# Patient Record
Sex: FEMALE | NOT HISPANIC OR LATINO | Employment: OTHER | URBAN - NONMETROPOLITAN AREA
[De-identification: names, ages, dates, MRNs, and addresses within clinical notes are randomized per-mention and may not be internally consistent; named-entity substitution may affect disease eponyms.]

---

## 2017-01-11 ENCOUNTER — OFFICE VISIT (OUTPATIENT)
Dept: CARDIOLOGY | Facility: CLINIC | Age: 72
End: 2017-01-11

## 2017-01-11 VITALS
WEIGHT: 213 LBS | BODY MASS INDEX: 36.37 KG/M2 | HEART RATE: 72 BPM | HEIGHT: 64 IN | SYSTOLIC BLOOD PRESSURE: 130 MMHG | DIASTOLIC BLOOD PRESSURE: 60 MMHG

## 2017-01-11 DIAGNOSIS — I11.9 HYPERTENSIVE HEART DISEASE WITHOUT HEART FAILURE: Primary | ICD-10-CM

## 2017-01-11 DIAGNOSIS — E11.9 CONTROLLED TYPE 2 DIABETES MELLITUS WITHOUT COMPLICATION, WITH LONG-TERM CURRENT USE OF INSULIN (HCC): ICD-10-CM

## 2017-01-11 DIAGNOSIS — E78.00 HYPERCHOLESTEREMIA: ICD-10-CM

## 2017-01-11 DIAGNOSIS — R06.2 WHEEZES: ICD-10-CM

## 2017-01-11 DIAGNOSIS — E66.9 OBESITY WITH BODY MASS INDEX OF 30.0-39.9: ICD-10-CM

## 2017-01-11 DIAGNOSIS — I77.9 BILATERAL CAROTID ARTERY DISEASE (HCC): ICD-10-CM

## 2017-01-11 DIAGNOSIS — R09.89 LEFT CAROTID BRUIT: ICD-10-CM

## 2017-01-11 DIAGNOSIS — I73.9 PVD (PERIPHERAL VASCULAR DISEASE) (HCC): ICD-10-CM

## 2017-01-11 DIAGNOSIS — G47.30 SLEEP APNEA IN ADULT: ICD-10-CM

## 2017-01-11 DIAGNOSIS — Z79.4 CONTROLLED TYPE 2 DIABETES MELLITUS WITHOUT COMPLICATION, WITH LONG-TERM CURRENT USE OF INSULIN (HCC): ICD-10-CM

## 2017-01-11 DIAGNOSIS — I10 ESSENTIAL HYPERTENSION: ICD-10-CM

## 2017-01-11 PROBLEM — E88.81 METABOLIC SYNDROME: Status: ACTIVE | Noted: 2017-01-11

## 2017-01-11 PROBLEM — E88.810 METABOLIC SYNDROME: Status: ACTIVE | Noted: 2017-01-11

## 2017-01-11 PROCEDURE — 99213 OFFICE O/P EST LOW 20 MIN: CPT | Performed by: NURSE PRACTITIONER

## 2017-01-11 RX ORDER — GABAPENTIN 300 MG/1
CAPSULE ORAL
COMMUNITY
End: 2019-09-11 | Stop reason: ALTCHOICE

## 2017-01-11 RX ORDER — BUDESONIDE 0.5 MG/2ML
0.5 INHALANT ORAL
COMMUNITY
End: 2018-02-28 | Stop reason: ALTCHOICE

## 2017-01-11 RX ORDER — FUROSEMIDE 40 MG/1
40 TABLET ORAL DAILY
COMMUNITY
End: 2023-02-02 | Stop reason: SDUPTHER

## 2017-01-11 RX ORDER — LOSARTAN POTASSIUM 100 MG/1
100 TABLET ORAL DAILY
COMMUNITY
End: 2018-07-11

## 2017-01-11 RX ORDER — ALBUTEROL SULFATE 90 UG/1
2 AEROSOL, METERED RESPIRATORY (INHALATION) EVERY 4 HOURS PRN
COMMUNITY

## 2017-01-11 RX ORDER — ASCORBIC ACID 1000 MG
TABLET, EXTENDED RELEASE ORAL
COMMUNITY
End: 2018-02-28 | Stop reason: ALTCHOICE

## 2017-01-11 RX ORDER — SODIUM PHOSPHATE,MONO-DIBASIC 19G-7G/118
ENEMA (ML) RECTAL NIGHTLY
COMMUNITY
End: 2018-02-28 | Stop reason: ALTCHOICE

## 2017-01-11 RX ORDER — PIOGLITAZONEHYDROCHLORIDE 30 MG/1
30 TABLET ORAL DAILY
COMMUNITY

## 2017-01-11 RX ORDER — ESOMEPRAZOLE MAGNESIUM 40 MG/1
40 CAPSULE, DELAYED RELEASE ORAL 2 TIMES DAILY
COMMUNITY

## 2017-01-11 RX ORDER — NEBIVOLOL 2.5 MG/1
2.5 TABLET ORAL DAILY
COMMUNITY
End: 2017-01-11 | Stop reason: SDUPTHER

## 2017-01-11 RX ORDER — NEBIVOLOL 2.5 MG/1
2.5 TABLET ORAL DAILY
Qty: 90 TABLET | Refills: 3 | Status: SHIPPED | OUTPATIENT
Start: 2017-01-11 | End: 2017-01-19 | Stop reason: ALTCHOICE

## 2017-01-11 RX ORDER — LEVOTHYROXINE SODIUM 137 UG/1
137 TABLET ORAL DAILY
COMMUNITY
End: 2020-09-02 | Stop reason: ALTCHOICE

## 2017-01-11 RX ORDER — ERGOCALCIFEROL 1.25 MG/1
50000 CAPSULE ORAL
COMMUNITY
End: 2018-07-11 | Stop reason: DRUGHIGH

## 2017-01-11 RX ORDER — ATORVASTATIN CALCIUM 10 MG/1
10 TABLET, FILM COATED ORAL DAILY
COMMUNITY
End: 2021-03-10 | Stop reason: SDUPTHER

## 2017-01-11 RX ORDER — ASPIRIN 81 MG/1
81 TABLET ORAL DAILY
COMMUNITY

## 2017-01-11 NOTE — LETTER
January 11, 2017     Omega Ramirez MD  [address]  [place] [address]    Patient: Bibiana Quezada   YOB: 1945   Date of Visit: 1/11/2017       Dear Dr. James MD:    Bibiana Quezada was in my office today. Below is a copy of my note.    If you have questions, please do not hesitate to call me. I look forward to following Bibiana along with you.         Sincerely,        Dian Sands, RENETTA        CC: No Recipients    Chief Complaint   Patient presents with   • Follow-up     denies any cardiac problems.    • Med Refill     Refills needed on cardiac meds.  90 days to [facility].       Subjective       Bibiana Quezada is a 71 y.o. female with a history of hypertension, hypothyroidism, hypercholesterolemia and metabolic syndrome.  She developed significant carotid artery disease and underwent surgery for the same in January 2014.  She also has a history of numerous back surgeries.  Due to issues with blood pressure in the past she was started on Lopressor and then clonidine.  She later tapered and stopped these medications.  Later her dose of Cardizem was increased secondary to palpitations and she found beneficial.  In October 2016, she underwent repeat cardiac workup in the form of a stress test and echocardiogram.  Possible apical ischemia was noted therefore cardiac catheterization was done.  Results showed normal coronaries and normal LV function but she does have hypertensive heart disease.  Today she returns to the office for a hospital follow-up appointment and no cardiac complaints are voiced. Since addition of Bystolic her blood pressure and heart rate have remain improved. She continues to have issues with back and limited ambulation. She continues to follow with Dr. Fisher for pulmonary management and wears her CPAP nightly. She does admit to burning type numbness and pain in her feet which seems to be getting slightly worse. Dr. Clayton manages her carotid  artery disease.     HPI         Cardiac History:    Past Surgical History   Procedure Laterality Date   • Us carotid unilateral  12/03/2013     @[facility]- Critical Stenosis  (R) ICA.   • Carotid endarterectomy  01/16/2014     (R) Endarterectomy by Dr. Clayton   • Cardiovascular stress test  10/12/2016     L. Myoview- Apical Ischemia   • Echo - converted  08/07/2012     EF 65%   • Cardiovascular stress test  05/14/2013     L. Myoview- R/O Apical Ischemia.   • Cardiac catheterization  10/24/2016     Normal Coronaries. Hypertensive Heart Disease.   • Cardiovascular stress test  12/14/2011      L. Myoview- (Fl) Negative    • Echo - converted  10/12/2016     EF 65%, mild MR, borderline PHT   • Cardiovascular stress test  10/12/2016     Lexiscan- small apical ischemia, increase Imdur, cath       Current Outpatient Prescriptions   Medication Sig Dispense Refill   • albuterol (PROVENTIL HFA;VENTOLIN HFA) 108 (90 BASE) MCG/ACT inhaler Inhale 2 puffs Every 4 (Four) Hours As Needed for wheezing.     • Ascorbic Acid (VITAMIN C ER) 1000 MG tablet controlled-release Take  by mouth.     • aspirin 81 MG EC tablet Take 81 mg by mouth Daily.     • atorvastatin (LIPITOR) 10 MG tablet Take 10 mg by mouth Daily.     • B Complex Vitamins (VITAMIN B COMPLEX PO) Take  by mouth.     • budesonide (PULMICORT) 0.5 MG/2ML nebulizer solution Take 0.5 mg by nebulization Daily.     • diltiazem CD (CARDIZEM CD) 360 MG 24 hr capsule TAKE ONE CAPSULE BY MOUTH DAILY 90 capsule 2   • esomeprazole (nexIUM) 40 MG capsule Take 40 mg by mouth Every Morning Before Breakfast.     • furosemide (LASIX) 40 MG tablet Take 40 mg by mouth Daily.     • gabapentin (NEURONTIN) 300 MG capsule Take 300 mg by mouth 2 (Two) Times a Day.     • glucosamine-chondroitin 500-400 MG capsule capsule Take  by mouth Every Night.     • Insulin Lispro (HUMALOG SC) Inject  under the skin.     • isosorbide mononitrate (IMDUR) 60 MG 24 hr tablet Take 1 tablet by mouth Every Morning. 30  tablet 8   • Krill Oil 300 MG capsule Take  by mouth.     • levothyroxine (SYNTHROID, LEVOTHROID) 137 MCG tablet Take 137 mcg by mouth Daily.     • losartan (COZAAR) 100 MG tablet Take 100 mg by mouth Daily.     • metFORMIN (GLUCOPHAGE) 500 MG tablet Take 500 mg by mouth 2 (Two) Times a Day With Meals.     • Multiple Vitamin (MULTI VITAMIN DAILY PO) Take  by mouth.     • nebivolol (BYSTOLIC) 2.5 MG tablet Take 1 tablet by mouth Daily for 90 days. 90 tablet 3   • pioglitazone (ACTOS) 30 MG tablet Take 30 mg by mouth Daily.     • vitamin D (ERGOCALCIFEROL) 04364 UNITS capsule capsule Take 50,000 Units by mouth. Decrease to once a month dose       No current facility-administered medications for this visit.        Codeine; Demerol [meperidine]; Hydrocodone; Lescol [fluvastatin sodium]; Livalo [pitavastatin]; Morphine and related; Other; Percocet [oxycodone-acetaminophen]; Percodan [oxycodone-aspirin]; Pravachol [pravastatin sodium]; Talwin [pentazocine]; Tape; Ultram [tramadol hcl]; Zetia [ezetimibe]; and Zocor [simvastatin]    Past Medical History   Diagnosis Date   • Carotid artery disease    • Carpal tunnel syndrome    • COPD (chronic obstructive pulmonary disease)      asthma, chronic bronchitis   • Diabetes    • GERD (gastroesophageal reflux disease)       s/p surgery for hiatal hernia   • History of cholecystectomy    • HTN (hypertension)    • Hypercholesterolemia    • Palpitations    • Previous back surgery      x5   • S/P GA (total abdominal hysterectomy)    • TIA (transient ischemic attack)        Social History     Social History   • Marital status:      Spouse name: N/A   • Number of children: N/A   • Years of education: N/A     Occupational History   • Not on file.     Social History Main Topics   • Smoking status: Former Smoker   • Smokeless tobacco: Not on file   • Alcohol use Yes      Comment: ssocially, drinks one drink, screwdriver per day.    • Drug use: Not on file   • Sexual activity: Not on  "file     Other Topics Concern   • Not on file     Social History Narrative       Family History   Problem Relation Age of Onset   • Heart attack Mother    • Pancreatic cancer Father    • Other Brother      GABG   • Hypertension Other        Review of Systems   Constitutional: Positive for fatigue. Negative for activity change, appetite change and fever.   HENT: Negative for congestion, sinus pressure, trouble swallowing and voice change.    Respiratory: Positive for cough (relates to asthma), shortness of breath and wheezing.    Cardiovascular: Positive for leg swelling (mild, same). Negative for chest pain and palpitations.   Gastrointestinal: Negative for abdominal distention, abdominal pain, blood in stool and nausea.   Genitourinary: Negative for difficulty urinating, dysuria and hematuria.   Musculoskeletal: Positive for arthralgias, back pain, gait problem and myalgias.   Neurological: Positive for numbness (feet ). Negative for dizziness, facial asymmetry, speech difficulty and headaches.   Hematological: Does not bruise/bleed easily.   Psychiatric/Behavioral: Positive for sleep disturbance (uses CPAP). Negative for confusion. The patient is not nervous/anxious.        Diabetes- Yes  Thyroid-abnormal    Objective     Visit Vitals   • /60   • Pulse 72   • Ht 64\" (162.6 cm)   • Wt 213 lb (96.6 kg)   • BMI 36.56 kg/m2       Physical Exam   Constitutional: She is oriented to person, place, and time. Vital signs are normal.   Eyes: Pupils are equal, round, and reactive to light.   Neck: Neck supple. No JVD present. Carotid bruit is present.   Cardiovascular: Normal rate and regular rhythm.    Murmur heard.   Systolic murmur is present with a grade of 2/6   Pulses:       Radial pulses are 2+ on the right side, and 2+ on the left side.        Dorsalis pedis pulses are 2+ on the right side, and 2+ on the left side.   Loud S2   Pulmonary/Chest: No respiratory distress. She has wheezes. She has no rales. "   Abdominal: Soft. Bowel sounds are normal.   Musculoskeletal: She exhibits edema (trace ankles).   Neurological: She is alert and oriented to person, place, and time.   Skin: Skin is warm and dry.   Psychiatric: She has a normal mood and affect. Her behavior is normal. Judgment and thought content normal.   Vitals reviewed.    Procedures          Assessment/Plan      Bibiana was seen today for follow-up and med refill.    Diagnoses and all orders for this visit:    Hypertensive heart disease without heart failure    Essential hypertension    Sleep apnea in adult    Hypercholesteremia    Wheezes    Controlled type 2 diabetes mellitus without complication, with long-term current use of insulin    Bilateral carotid artery disease    Left carotid bruit    PVD (peripheral vascular disease)    Obesity with body mass index of 30.0-39.9    Other orders  -     nebivolol (BYSTOLIC) 2.5 MG tablet; Take 1 tablet by mouth Daily for 90 days.        When she sees Dr. Clayton in March for evaluation of carotid artery disease,  I advised her to also discuss reassessment of PVD since she wants to wait until she returns from [place] if possible. She has had improvement of cardiac symptoms since medication changes for HTN management. We will continue the same at this time. Her recent cath report was reviewed. LV function is noted as normal. She will follow with you for management of labs.           Electronically signed by RENETTA Hawk,  January 11, 2017 5:19 PM

## 2017-01-11 NOTE — MR AVS SNAPSHOT
Bibiaan Quezada   1/11/2017 11:30 AM   Office Visit    Dept Phone:  [phone]   Encounter #:  [id number]    Provider:  RENETTA Wilkes   Department:  [facility] CARDIOLOGY                Your Full Care Plan              Where to Get Your Medications      These medications were sent to [address], [place] - [address] [address] AT [facility] [address] - [phone]  - [phone]   [address] [facility] [address], [facility] [address]     Phone:  [phone]     nebivolol 2.5 MG tablet            Your Updated Medication List          This list is accurate as of: 1/11/17 12:45 PM.  Always use your most recent med list.                albuterol 108 (90 BASE) MCG/ACT inhaler   Commonly known as:  PROVENTIL HFA;VENTOLIN HFA       aspirin 81 MG EC tablet       atorvastatin 10 MG tablet   Commonly known as:  LIPITOR       budesonide 0.5 MG/2ML nebulizer solution   Commonly known as:  PULMICORT       COZAAR 100 MG tablet   Generic drug:  losartan       diltiaZEM  MG 24 hr capsule   Commonly known as:  CARDIZEM CD   TAKE ONE CAPSULE BY MOUTH DAILY       esomeprazole 40 MG capsule   Commonly known as:  nexIUM       furosemide 40 MG tablet   Commonly known as:  LASIX       gabapentin 300 MG capsule   Commonly known as:  NEURONTIN       glucosamine-chondroitin 500-400 MG capsule capsule       HUMALOG SC       isosorbide mononitrate 60 MG 24 hr tablet   Commonly known as:  IMDUR   Take 1 tablet by mouth Every Morning.       Krill Oil 300 MG capsule       levothyroxine 137 MCG tablet   Commonly known as:  SYNTHROID, LEVOTHROID       metFORMIN 500 MG tablet   Commonly known as:  GLUCOPHAGE       MULTI VITAMIN DAILY PO       nebivolol 2.5 MG tablet   Commonly known as:  BYSTOLIC   Take 1 tablet by mouth Daily for 90 days.       pioglitazone 30 MG tablet   Commonly known as:  ACTOS       VITAMIN B COMPLEX PO       Vitamin C ER 1000 MG tablet  "controlled-release       vitamin D 31641 UNITS capsule capsule   Commonly known as:  ERGOCALCIFEROL               You Were Diagnosed With        Codes Comments    Hypertensive heart disease without heart failure    -  Primary ICD-10-CM: I11.9  ICD-9-CM: 402.90     Essential hypertension     ICD-10-CM: I10  ICD-9-CM: 401.9       Instructions     None    Patient Instructions History      Upcoming Appointments     Visit Type Date Time Department    HOSPITAL FOLLOW UP 1/11/2017 11:30 AM [facility]    FOLLOW UP 7/12/2017 11:15 AM [facility]      MyChart Signup     Our records indicate that you have declined [facility] INTEX Programhart signup. If you would like to sign up for INTEX Programhart, please email [email or web address] or call [phone] to obtain an activation code.             Other Info from Your Visit           Your Appointments     Jul 12, 2017 11:15 AM EDT   Follow Up with RENETTA Reynolds   [facility] MEDICAL [facility] CARDIOLOGY (--)    [address] [facility] [address] [facility] [address] [phone]           Arrive 15 minutes prior to appointment.              Allergies     Codeine      Demerol [Meperidine]      Hydrocodone      Lescol [Fluvastatin Sodium]      Livalo [Pitavastatin]      Morphine And Related      Other      EEC , Antibiotics    Percocet [Oxycodone-acetaminophen]      Percodan [Oxycodone-aspirin]      Pravachol [Pravastatin Sodium]      Talwin [Pentazocine]      Tape      Ultram [Tramadol Hcl]      Zetia [Ezetimibe]      Zocor [Simvastatin]        Reason for Visit     Follow-up denies any cardiac problems.     Med Refill Refills needed on cardiac meds.  [place] [facility] [place].      Vital Signs     Blood Pressure Pulse Height Weight Body Mass Index Smoking Status    130/60 72 64\" (162.6 cm) 213 lb (96.6 kg) 36.56 kg/m2 Former Smoker      Problems and Diagnoses Noted     Heart disease due to high blood pressure    -  Primary    High blood pressure            "

## 2017-01-11 NOTE — PROGRESS NOTES
Chief Complaint   Patient presents with   • Follow-up     denies any cardiac problems.    • Med Refill     Refills needed on cardiac meds.  90 days to [facility]ak [facility].       Subjective       Bibiana Quezada is a 71 y.o. female with a history of hypertension, hypothyroidism, hypercholesterolemia and metabolic syndrome.  She developed significant carotid artery disease and underwent surgery for the same in January 2014.  She also has a history of numerous back surgeries.  Due to issues with blood pressure in the past she was started on Lopressor and then clonidine.  She later tapered and stopped these medications.  Later her dose of Cardizem was increased secondary to palpitations and she found beneficial.  In October 2016, she underwent repeat cardiac workup in the form of a stress test and echocardiogram.  Possible apical ischemia was noted therefore cardiac catheterization was done.  Results showed normal coronaries and normal LV function but she does have hypertensive heart disease.  Today she returns to the office for a hospital follow-up appointment and no cardiac complaints are voiced. Since addition of Bystolic her blood pressure and heart rate have remain improved. She continues to have issues with back and limited ambulation. She continues to follow with Dr. Fisher for pulmonary management and wears her CPAP nightly. She does admit to burning type numbness and pain in her feet which seems to be getting slightly worse. Dr. Clayton manages her carotid artery disease.     HPI         Cardiac History:    Past Surgical History   Procedure Laterality Date   • Us carotid unilateral  12/03/2013     @[facility]- Critical Stenosis  (R) ICA.   • Carotid endarterectomy  01/16/2014     (R) Endarterectomy by Dr. Clayton   • Cardiovascular stress test  10/12/2016     L. Myoview- Apical Ischemia   • Echo - converted  08/07/2012     EF 65%   • Cardiovascular stress test  05/14/2013     L. Myoview- R/O Apical Ischemia.   • Cardiac  catheterization  10/24/2016     Normal Coronaries. Hypertensive Heart Disease.   • Cardiovascular stress test  12/14/2011      L. Myoview- (Fl) Negative    • Echo - converted  10/12/2016     EF 65%, mild MR, borderline PHT   • Cardiovascular stress test  10/12/2016     Lexiscan- small apical ischemia, increase Imdur, cath       Current Outpatient Prescriptions   Medication Sig Dispense Refill   • albuterol (PROVENTIL HFA;VENTOLIN HFA) 108 (90 BASE) MCG/ACT inhaler Inhale 2 puffs Every 4 (Four) Hours As Needed for wheezing.     • Ascorbic Acid (VITAMIN C ER) 1000 MG tablet controlled-release Take  by mouth.     • aspirin 81 MG EC tablet Take 81 mg by mouth Daily.     • atorvastatin (LIPITOR) 10 MG tablet Take 10 mg by mouth Daily.     • B Complex Vitamins (VITAMIN B COMPLEX PO) Take  by mouth.     • budesonide (PULMICORT) 0.5 MG/2ML nebulizer solution Take 0.5 mg by nebulization Daily.     • diltiazem CD (CARDIZEM CD) 360 MG 24 hr capsule TAKE ONE CAPSULE BY MOUTH DAILY 90 capsule 2   • esomeprazole (nexIUM) 40 MG capsule Take 40 mg by mouth Every Morning Before Breakfast.     • furosemide (LASIX) 40 MG tablet Take 40 mg by mouth Daily.     • gabapentin (NEURONTIN) 300 MG capsule Take 300 mg by mouth 2 (Two) Times a Day.     • glucosamine-chondroitin 500-400 MG capsule capsule Take  by mouth Every Night.     • Insulin Lispro (HUMALOG SC) Inject  under the skin.     • isosorbide mononitrate (IMDUR) 60 MG 24 hr tablet Take 1 tablet by mouth Every Morning. 30 tablet 8   • Krill Oil 300 MG capsule Take  by mouth.     • levothyroxine (SYNTHROID, LEVOTHROID) 137 MCG tablet Take 137 mcg by mouth Daily.     • losartan (COZAAR) 100 MG tablet Take 100 mg by mouth Daily.     • metFORMIN (GLUCOPHAGE) 500 MG tablet Take 500 mg by mouth 2 (Two) Times a Day With Meals.     • Multiple Vitamin (MULTI VITAMIN DAILY PO) Take  by mouth.     • nebivolol (BYSTOLIC) 2.5 MG tablet Take 1 tablet by mouth Daily for 90 days. 90 tablet 3   •  pioglitazone (ACTOS) 30 MG tablet Take 30 mg by mouth Daily.     • vitamin D (ERGOCALCIFEROL) 76592 UNITS capsule capsule Take 50,000 Units by mouth. Decrease to once a month dose       No current facility-administered medications for this visit.        Codeine; Demerol [meperidine]; Hydrocodone; Lescol [fluvastatin sodium]; Livalo [pitavastatin]; Morphine and related; Other; Percocet [oxycodone-acetaminophen]; Percodan [oxycodone-aspirin]; Pravachol [pravastatin sodium]; Talwin [pentazocine]; Tape; Ultram [tramadol hcl]; Zetia [ezetimibe]; and Zocor [simvastatin]    Past Medical History   Diagnosis Date   • Carotid artery disease    • Carpal tunnel syndrome    • COPD (chronic obstructive pulmonary disease)      asthma, chronic bronchitis   • Diabetes    • GERD (gastroesophageal reflux disease)       s/p surgery for hiatal hernia   • History of cholecystectomy    • HTN (hypertension)    • Hypercholesterolemia    • Palpitations    • Previous back surgery      x5   • S/P GA (total abdominal hysterectomy)    • TIA (transient ischemic attack)        Social History     Social History   • Marital status:      Spouse name: N/A   • Number of children: N/A   • Years of education: N/A     Occupational History   • Not on file.     Social History Main Topics   • Smoking status: Former Smoker   • Smokeless tobacco: Not on file   • Alcohol use Yes      Comment: ssocially, drinks one drink, screwdriver per day.    • Drug use: Not on file   • Sexual activity: Not on file     Other Topics Concern   • Not on file     Social History Narrative       Family History   Problem Relation Age of Onset   • Heart attack Mother    • Pancreatic cancer Father    • Other Brother      GABG   • Hypertension Other        Review of Systems   Constitutional: Positive for fatigue. Negative for activity change, appetite change and fever.   HENT: Negative for congestion, sinus pressure, trouble swallowing and voice change.    Respiratory: Positive  "for cough (relates to asthma), shortness of breath and wheezing.    Cardiovascular: Positive for leg swelling (mild, same). Negative for chest pain and palpitations.   Gastrointestinal: Negative for abdominal distention, abdominal pain, blood in stool and nausea.   Genitourinary: Negative for difficulty urinating, dysuria and hematuria.   Musculoskeletal: Positive for arthralgias, back pain, gait problem and myalgias.   Neurological: Positive for numbness (feet ). Negative for dizziness, facial asymmetry, speech difficulty and headaches.   Hematological: Does not bruise/bleed easily.   Psychiatric/Behavioral: Positive for sleep disturbance (uses CPAP). Negative for confusion. The patient is not nervous/anxious.        Diabetes- Yes  Thyroid-abnormal    Objective     Visit Vitals   • /60   • Pulse 72   • Ht 64\" (162.6 cm)   • Wt 213 lb (96.6 kg)   • BMI 36.56 kg/m2       Physical Exam   Constitutional: She is oriented to person, place, and time. Vital signs are normal.   Eyes: Pupils are equal, round, and reactive to light.   Neck: Neck supple. No JVD present. Carotid bruit is present.   Cardiovascular: Normal rate and regular rhythm.    Murmur heard.   Systolic murmur is present with a grade of 2/6   Pulses:       Radial pulses are 2+ on the right side, and 2+ on the left side.        Dorsalis pedis pulses are 2+ on the right side, and 2+ on the left side.   Loud S2   Pulmonary/Chest: No respiratory distress. She has wheezes. She has no rales.   Abdominal: Soft. Bowel sounds are normal.   Musculoskeletal: She exhibits edema (trace ankles).   Neurological: She is alert and oriented to person, place, and time.   Skin: Skin is warm and dry.   Psychiatric: She has a normal mood and affect. Her behavior is normal. Judgment and thought content normal.   Vitals reviewed.    Procedures          Assessment/Plan      Bibiana was seen today for follow-up and med refill.    Diagnoses and all orders for this " visit:    Hypertensive heart disease without heart failure    Essential hypertension    Sleep apnea in adult    Hypercholesteremia    Wheezes    Controlled type 2 diabetes mellitus without complication, with long-term current use of insulin    Bilateral carotid artery disease    Left carotid bruit    PVD (peripheral vascular disease)    Obesity with body mass index of 30.0-39.9    Other orders  -     nebivolol (BYSTOLIC) 2.5 MG tablet; Take 1 tablet by mouth Daily for 90 days.        When she sees Dr. Clayton in March for evaluation of carotid artery disease,  I advised her to also discuss reassessment of PVD since she wants to wait until she returns from [place] if possible. She has had improvement of cardiac symptoms since medication changes for HTN management. We will continue the same at this time. Her recent cath report was reviewed. LV function is noted as normal. She will follow with you for management of labs.           Electronically signed by RENETTA Hawk,  January 11, 2017 5:19 PM

## 2017-01-18 ENCOUNTER — TELEPHONE (OUTPATIENT)
Dept: CARDIOLOGY | Facility: CLINIC | Age: 72
End: 2017-01-18

## 2017-01-18 NOTE — TELEPHONE ENCOUNTER
Received call from patient today reporting concerned of cost of bystolic 2.5mg states her co pay is $105.00 dollars, we do not have any samples available at this time, patient is wanting to know if there is something she could take that would be cheaper?

## 2017-03-28 ENCOUNTER — TELEPHONE (OUTPATIENT)
Dept: CARDIOLOGY | Facility: CLINIC | Age: 72
End: 2017-03-28

## 2017-03-28 RX ORDER — ISOSORBIDE MONONITRATE 60 MG/1
60 TABLET, EXTENDED RELEASE ORAL EVERY MORNING
Qty: 90 TABLET | Refills: 1 | Status: SHIPPED | OUTPATIENT
Start: 2017-03-28 | End: 2018-10-02 | Stop reason: ALTCHOICE

## 2017-03-28 NOTE — TELEPHONE ENCOUNTER
Received faxed refill request for 90 day refills on Imdur 60 mg daily, script sent into pharmacy.

## 2017-04-11 ENCOUNTER — TELEPHONE (OUTPATIENT)
Dept: CARDIOLOGY | Facility: CLINIC | Age: 72
End: 2017-04-11

## 2017-04-14 NOTE — TELEPHONE ENCOUNTER
[facility] requesting cardiac clearance for total knee replacement in May. Do you wish to hold aspirin 81mg prior to procedure?

## 2017-05-10 ENCOUNTER — TRANSCRIBE ORDERS (OUTPATIENT)
Dept: LAB | Facility: HOSPITAL | Age: 72
End: 2017-05-10

## 2017-05-10 ENCOUNTER — LAB (OUTPATIENT)
Dept: LAB | Facility: HOSPITAL | Age: 72
End: 2017-05-10

## 2017-05-10 DIAGNOSIS — E13.8 DIABETES MELLITUS OF OTHER TYPE WITH COMPLICATION: Primary | ICD-10-CM

## 2017-05-10 DIAGNOSIS — E13.8 DIABETES MELLITUS OF OTHER TYPE WITH COMPLICATION: ICD-10-CM

## 2017-05-10 DIAGNOSIS — E03.9 UNSPECIFIED HYPOTHYROIDISM: ICD-10-CM

## 2017-05-10 LAB
ALBUMIN SERPL-MCNC: 4.2 G/DL (ref 3.2–4.8)
ALBUMIN/GLOB SERPL: 1.9 G/DL (ref 1.5–2.5)
ALP SERPL-CCNC: 104 U/L (ref 25–100)
ALT SERPL W P-5'-P-CCNC: 36 U/L (ref 7–40)
ANION GAP SERPL CALCULATED.3IONS-SCNC: 6 MMOL/L (ref 3–11)
AST SERPL-CCNC: 35 U/L (ref 0–33)
BILIRUB SERPL-MCNC: 0.4 MG/DL (ref 0.3–1.2)
BUN BLD-MCNC: 19 MG/DL (ref 9–23)
BUN/CREAT SERPL: 19 (ref 7–25)
CALCIUM SPEC-SCNC: 9.7 MG/DL (ref 8.7–10.4)
CHLORIDE SERPL-SCNC: 106 MMOL/L (ref 99–109)
CO2 SERPL-SCNC: 33 MMOL/L (ref 20–31)
CREAT BLD-MCNC: 1 MG/DL (ref 0.6–1.3)
GFR SERPL CREATININE-BSD FRML MDRD: 55 ML/MIN/1.73
GLOBULIN UR ELPH-MCNC: 2.2 GM/DL
GLUCOSE BLD-MCNC: 115 MG/DL (ref 70–100)
HBA1C MFR BLD: 7 % (ref 4.8–5.6)
POTASSIUM BLD-SCNC: 4.2 MMOL/L (ref 3.5–5.5)
PROT SERPL-MCNC: 6.4 G/DL (ref 5.7–8.2)
SODIUM BLD-SCNC: 145 MMOL/L (ref 132–146)
T3RU NFR SERPL: 36.3 % (ref 23–37)
T4 FREE SERPL-MCNC: 1.31 NG/DL (ref 0.89–1.76)
T4 SERPL-MCNC: 10.1 MCG/DL (ref 4.7–11.4)
TSH SERPL DL<=0.05 MIU/L-ACNC: 0.03 MIU/ML (ref 0.35–5.35)

## 2017-05-10 PROCEDURE — 84479 ASSAY OF THYROID (T3 OR T4): CPT | Performed by: INTERNAL MEDICINE

## 2017-05-10 PROCEDURE — 84439 ASSAY OF FREE THYROXINE: CPT | Performed by: INTERNAL MEDICINE

## 2017-05-10 PROCEDURE — 80053 COMPREHEN METABOLIC PANEL: CPT | Performed by: INTERNAL MEDICINE

## 2017-05-10 PROCEDURE — 36415 COLL VENOUS BLD VENIPUNCTURE: CPT

## 2017-05-10 PROCEDURE — 83036 HEMOGLOBIN GLYCOSYLATED A1C: CPT | Performed by: INTERNAL MEDICINE

## 2017-05-10 PROCEDURE — 84443 ASSAY THYROID STIM HORMONE: CPT | Performed by: INTERNAL MEDICINE

## 2017-07-12 ENCOUNTER — OFFICE VISIT (OUTPATIENT)
Dept: CARDIOLOGY | Facility: CLINIC | Age: 72
End: 2017-07-12

## 2017-07-12 VITALS
WEIGHT: 204 LBS | HEIGHT: 64 IN | HEART RATE: 84 BPM | BODY MASS INDEX: 34.83 KG/M2 | DIASTOLIC BLOOD PRESSURE: 60 MMHG | SYSTOLIC BLOOD PRESSURE: 130 MMHG

## 2017-07-12 DIAGNOSIS — Z79.4 CONTROLLED TYPE 2 DIABETES MELLITUS WITHOUT COMPLICATION, WITH LONG-TERM CURRENT USE OF INSULIN (HCC): ICD-10-CM

## 2017-07-12 DIAGNOSIS — E78.00 HYPERCHOLESTEREMIA: ICD-10-CM

## 2017-07-12 DIAGNOSIS — G47.30 SLEEP APNEA IN ADULT: ICD-10-CM

## 2017-07-12 DIAGNOSIS — I11.9 HYPERTENSIVE HEART DISEASE WITHOUT HEART FAILURE: ICD-10-CM

## 2017-07-12 DIAGNOSIS — I77.9 BILATERAL CAROTID ARTERY DISEASE (HCC): ICD-10-CM

## 2017-07-12 DIAGNOSIS — I10 ESSENTIAL HYPERTENSION: Primary | ICD-10-CM

## 2017-07-12 DIAGNOSIS — E11.9 CONTROLLED TYPE 2 DIABETES MELLITUS WITHOUT COMPLICATION, WITH LONG-TERM CURRENT USE OF INSULIN (HCC): ICD-10-CM

## 2017-07-12 DIAGNOSIS — I73.9 PVD (PERIPHERAL VASCULAR DISEASE) (HCC): ICD-10-CM

## 2017-07-12 PROCEDURE — 99213 OFFICE O/P EST LOW 20 MIN: CPT | Performed by: NURSE PRACTITIONER

## 2017-07-12 RX ORDER — DULOXETIN HYDROCHLORIDE 60 MG/1
60 CAPSULE, DELAYED RELEASE ORAL DAILY
COMMUNITY

## 2017-07-12 NOTE — PROGRESS NOTES
"Chief Complaint   Patient presents with   • Follow-up     Had left knee replacement 6 weeks ago. Denies chest pain or palpitations. We switched bystolic to metoprolol but states she was unable to tolerate that as well \"it bottoms me out\". Doesn't need refills at this time. Labs per hospital before her knee replacement.    • Shortness of Breath     Feels is r/t the pain meds shes been taking.        Cardiac Complaints  none      Subjective   Bibiana Quezada is a 71 y.o. female with a history of hypertension, hypothyroidism, hypercholesterolemia and metabolic syndrome. She developed significant carotid artery disease and underwent surgery for the same in January 2014. She also has a history of numerous back surgeries. Due to issues with blood pressure in the past she was started on Lopressor and then clonidine. She later tapered and stopped these medications. Later her dose of Cardizem was increased secondary to palpitations and she found beneficial. In October 2016, she underwent repeat cardiac workup in the form of a stress test and echocardiogram. Possible apical ischemia was noted therefore cardiac catheterization was done. Results showed normal coronaries and normal LV function but she does have hypertensive heart disease.  At last visit, she reported some pain in her numbness in her feet and workup for PVD was recommended but she declined at present.  She did discuss with Dr. Clayton in March for carotid disease and she reports it was stable.  He also thought her numbness in her feet was neuropathy according to patient.  She denies any cardiac concerns.  She does state her blood pressure has been elevated some but she relates to her pain from knee surgery which she had 6 weeks ago and has done well.  She does have shortness of breath when she has to take her pain medication.  Labs she reports before surgery.  No refills are needed.  She is no longer on her metoprolol as it was bottoming her blood pressure " out.        Cardiac History  Past Surgical History:   Procedure Laterality Date   • CARDIAC CATHETERIZATION  10/24/2016    Normal Coronaries. Hypertensive Heart Disease.   • CARDIOVASCULAR STRESS TEST  10/12/2016    L. Myoview- Apical Ischemia   • CARDIOVASCULAR STRESS TEST  05/14/2013    L. Myoview- R/O Apical Ischemia.   • CARDIOVASCULAR STRESS TEST  12/14/2011     L. Myoview- (Fl) Negative    • CARDIOVASCULAR STRESS TEST  10/12/2016    Lexiscan- small apical ischemia, increase Imdur, cath   • CAROTID ENDARTERECTOMY  01/16/2014    (R) Endarterectomy by Dr. Clayton   • ECHO - CONVERTED  08/07/2012    EF 65%   • ECHO - CONVERTED  10/12/2016    EF 65%, mild MR, borderline PHT   • US CAROTID UNILATERAL  12/03/2013    @[facility]- Critical Stenosis  (R) ICA.       Current Outpatient Prescriptions   Medication Sig Dispense Refill   • albuterol (PROVENTIL HFA;VENTOLIN HFA) 108 (90 BASE) MCG/ACT inhaler Inhale 2 puffs Every 4 (Four) Hours As Needed for wheezing.     • Ascorbic Acid (VITAMIN C ER) 1000 MG tablet controlled-release Take  by mouth.     • aspirin 81 MG EC tablet Take 81 mg by mouth Daily.     • atorvastatin (LIPITOR) 10 MG tablet Take 10 mg by mouth Daily.     • B Complex Vitamins (VITAMIN B COMPLEX PO) Take  by mouth.     • budesonide (PULMICORT) 0.5 MG/2ML nebulizer solution Take 0.5 mg by nebulization Daily.     • diltiazem CD (CARDIZEM CD) 360 MG 24 hr capsule TAKE ONE CAPSULE BY MOUTH DAILY 90 capsule 2   • DULoxetine (CYMBALTA) 60 MG capsule Take 60 mg by mouth Daily.     • esomeprazole (nexIUM) 40 MG capsule Take 40 mg by mouth Every Morning Before Breakfast.     • furosemide (LASIX) 40 MG tablet Take 40 mg by mouth Daily.     • gabapentin (NEURONTIN) 300 MG capsule Take 300 mg by mouth 2 (Two) Times a Day.     • glucosamine-chondroitin 500-400 MG capsule capsule Take  by mouth Every Night.     • Insulin Lispro (HUMALOG SC) Inject  under the skin.     • isosorbide mononitrate (IMDUR) 60 MG 24 hr tablet Take 1  tablet by mouth Every Morning. 90 tablet 1   • Krill Oil 300 MG capsule Take  by mouth.     • levothyroxine (SYNTHROID, LEVOTHROID) 137 MCG tablet Take 137 mcg by mouth Daily.     • losartan (COZAAR) 100 MG tablet Take 100 mg by mouth Daily.     • Multiple Vitamin (MULTI VITAMIN DAILY PO) Take  by mouth.     • pioglitazone (ACTOS) 30 MG tablet Take 30 mg by mouth Daily.     • vitamin D (ERGOCALCIFEROL) 91742 UNITS capsule capsule Take 50,000 Units by mouth. Decrease to once a month dose       No current facility-administered medications for this visit.        Augmentin [amoxicillin-pot clavulanate]; Codeine; Demerol [meperidine]; Hydrocodone; Lescol [fluvastatin sodium]; Livalo [pitavastatin]; Morphine and related; Other; Percocet [oxycodone-acetaminophen]; Percodan [oxycodone-aspirin]; Pravachol [pravastatin sodium]; Talwin [pentazocine]; Tape; Ultram [tramadol hcl]; Zetia [ezetimibe]; and Zocor [simvastatin]    Past Medical History:   Diagnosis Date   • Carotid artery disease    • Carpal tunnel syndrome    • COPD (chronic obstructive pulmonary disease)     asthma, chronic bronchitis   • Diabetes    • GERD (gastroesophageal reflux disease)      s/p surgery for hiatal hernia   • History of cholecystectomy    • HTN (hypertension)    • Hypercholesterolemia    • Palpitations    • Previous back surgery     x5   • S/P GA (total abdominal hysterectomy)    • TIA (transient ischemic attack)        Social History     Social History   • Marital status:      Spouse name: N/A   • Number of children: N/A   • Years of education: N/A     Occupational History   • Not on file.     Social History Main Topics   • Smoking status: Former Smoker   • Smokeless tobacco: Never Used   • Alcohol use Yes      Comment: ssocially, drinks one drink, screwdriver per day.    • Drug use: Not on file   • Sexual activity: Not on file     Other Topics Concern   • Not on file     Social History Narrative       Family History   Problem Relation  "Age of Onset   • Heart attack Mother    • Pancreatic cancer Father    • Other Brother      ALEK   • Hypertension Other        Review of Systems   Constitution: Negative for weakness and malaise/fatigue.   Cardiovascular: Negative for chest pain, dyspnea on exertion, irregular heartbeat and palpitations.   Respiratory: Negative for cough and shortness of breath.    Musculoskeletal: Positive for joint pain and joint swelling.   Gastrointestinal: Negative for anorexia, heartburn and nausea.   Genitourinary: Negative for dysuria and hesitancy.   Neurological: Negative for dizziness, light-headedness and numbness.   Psychiatric/Behavioral: Negative for altered mental status and depression.       DiabetesNo  Thyroidnormal    Objective     /60  Pulse 84  Ht 64\" (162.6 cm)  Wt 204 lb (92.5 kg)  BMI 35.02 kg/m2    Physical Exam   Constitutional: She is oriented to person, place, and time. She appears well-developed and well-nourished.   HENT:   Head: Normocephalic and atraumatic.   Eyes: EOM are normal. Pupils are equal, round, and reactive to light.   Neck: Normal range of motion.   Cardiovascular: Normal rate and regular rhythm.    Murmur heard.  Pulmonary/Chest: Effort normal and breath sounds normal.   Abdominal: Soft.   Musculoskeletal: Normal range of motion.   Neurological: She is alert and oriented to person, place, and time.   Skin: Skin is warm and dry.   Psychiatric: She has a normal mood and affect. Her behavior is normal.       Procedures    Assessment/Plan     HR and BP are both stable.  We will encourage to decrease her metoprolol to 1/2 tablet daily to see if she can tolerate it better.  No new workup advised as no new concerns voiced.  Labs are done with endocrinology and PCP.  No refills are needed.  Good cardiac diet and activity as tolerated advised.  She will continue with PT for therapy for her knee.  6 month follow up advised or sooner if needed.        Problems Addressed this Visit        " Cardiovascular and Mediastinum    Hypertensive heart disease without heart failure    Essential hypertension - Primary    Hypercholesteremia    Bilateral carotid artery disease    PVD (peripheral vascular disease)       Endocrine    Controlled type 2 diabetes mellitus without complication, with long-term current use of insulin       Other    Sleep apnea in adult                  Electronically signed by RENETTA Greene July 12, 2017 4:25 PM

## 2017-08-16 ENCOUNTER — LAB (OUTPATIENT)
Dept: LAB | Facility: HOSPITAL | Age: 72
End: 2017-08-16

## 2017-08-16 ENCOUNTER — TRANSCRIBE ORDERS (OUTPATIENT)
Dept: LAB | Facility: HOSPITAL | Age: 72
End: 2017-08-16

## 2017-08-16 DIAGNOSIS — E03.9 UNSPECIFIED HYPOTHYROIDISM: ICD-10-CM

## 2017-08-16 DIAGNOSIS — E11.9 DIABETES MELLITUS WITHOUT COMPLICATION (HCC): ICD-10-CM

## 2017-08-16 DIAGNOSIS — E11.9 DIABETES MELLITUS WITHOUT COMPLICATION (HCC): Primary | ICD-10-CM

## 2017-08-16 LAB
ALBUMIN SERPL-MCNC: 4.4 G/DL (ref 3.2–4.8)
ALBUMIN/GLOB SERPL: 1.8 G/DL (ref 1.5–2.5)
ALP SERPL-CCNC: 175 U/L (ref 25–100)
ALT SERPL W P-5'-P-CCNC: 33 U/L (ref 7–40)
ANION GAP SERPL CALCULATED.3IONS-SCNC: 8 MMOL/L (ref 3–11)
AST SERPL-CCNC: 30 U/L (ref 0–33)
BILIRUB SERPL-MCNC: 0.3 MG/DL (ref 0.3–1.2)
BUN BLD-MCNC: 13 MG/DL (ref 9–23)
BUN/CREAT SERPL: 13 (ref 7–25)
CALCIUM SPEC-SCNC: 9.5 MG/DL (ref 8.7–10.4)
CHLORIDE SERPL-SCNC: 100 MMOL/L (ref 99–109)
CO2 SERPL-SCNC: 31 MMOL/L (ref 20–31)
CREAT BLD-MCNC: 1 MG/DL (ref 0.6–1.3)
GFR SERPL CREATININE-BSD FRML MDRD: 55 ML/MIN/1.73
GLOBULIN UR ELPH-MCNC: 2.4 GM/DL
GLUCOSE BLD-MCNC: 109 MG/DL (ref 70–100)
HBA1C MFR BLD: 7.7 % (ref 4.8–5.6)
POTASSIUM BLD-SCNC: 4.3 MMOL/L (ref 3.5–5.5)
PROT SERPL-MCNC: 6.8 G/DL (ref 5.7–8.2)
SODIUM BLD-SCNC: 139 MMOL/L (ref 132–146)

## 2017-08-16 PROCEDURE — 80053 COMPREHEN METABOLIC PANEL: CPT | Performed by: INTERNAL MEDICINE

## 2017-08-16 PROCEDURE — 36415 COLL VENOUS BLD VENIPUNCTURE: CPT

## 2017-08-16 PROCEDURE — 83036 HEMOGLOBIN GLYCOSYLATED A1C: CPT | Performed by: INTERNAL MEDICINE

## 2017-11-16 ENCOUNTER — LAB (OUTPATIENT)
Dept: LAB | Facility: HOSPITAL | Age: 72
End: 2017-11-16

## 2017-11-16 ENCOUNTER — TRANSCRIBE ORDERS (OUTPATIENT)
Dept: LAB | Facility: HOSPITAL | Age: 72
End: 2017-11-16

## 2017-11-16 DIAGNOSIS — E78.2 MIXED HYPERLIPIDEMIA: ICD-10-CM

## 2017-11-16 DIAGNOSIS — I51.9 MYXEDEMA HEART DISEASE: ICD-10-CM

## 2017-11-16 DIAGNOSIS — E10.69 TYPE 1 DIABETES MELLITUS WITH OTHER SPECIFIED COMPLICATION (HCC): Primary | ICD-10-CM

## 2017-11-16 DIAGNOSIS — E10.69 TYPE 1 DIABETES MELLITUS WITH OTHER SPECIFIED COMPLICATION (HCC): ICD-10-CM

## 2017-11-16 DIAGNOSIS — E03.9 MYXEDEMA HEART DISEASE: ICD-10-CM

## 2017-11-16 LAB
ALBUMIN SERPL-MCNC: 4.1 G/DL (ref 3.2–4.8)
ALBUMIN/GLOB SERPL: 2 G/DL (ref 1.5–2.5)
ALP SERPL-CCNC: 171 U/L (ref 25–100)
ALT SERPL W P-5'-P-CCNC: 23 U/L (ref 7–40)
ANION GAP SERPL CALCULATED.3IONS-SCNC: 5 MMOL/L (ref 3–11)
ARTICHOKE IGE QN: 78 MG/DL (ref 0–130)
AST SERPL-CCNC: 19 U/L (ref 0–33)
BILIRUB SERPL-MCNC: 0.3 MG/DL (ref 0.3–1.2)
BUN BLD-MCNC: 16 MG/DL (ref 9–23)
BUN/CREAT SERPL: 13.3 (ref 7–25)
CALCIUM SPEC-SCNC: 8.8 MG/DL (ref 8.7–10.4)
CHLORIDE SERPL-SCNC: 96 MMOL/L (ref 99–109)
CHOLEST SERPL-MCNC: 150 MG/DL (ref 0–200)
CO2 SERPL-SCNC: 33 MMOL/L (ref 20–31)
CREAT BLD-MCNC: 1.2 MG/DL (ref 0.6–1.3)
GFR SERPL CREATININE-BSD FRML MDRD: 44 ML/MIN/1.73
GLOBULIN UR ELPH-MCNC: 2.1 GM/DL
GLUCOSE BLD-MCNC: 287 MG/DL (ref 70–100)
HBA1C MFR BLD: 8.6 % (ref 4.8–5.6)
HDLC SERPL-MCNC: 40 MG/DL (ref 40–60)
POTASSIUM BLD-SCNC: 4.5 MMOL/L (ref 3.5–5.5)
PROT SERPL-MCNC: 6.2 G/DL (ref 5.7–8.2)
SODIUM BLD-SCNC: 134 MMOL/L (ref 132–146)
T3RU NFR SERPL: 35.3 % (ref 23–37)
T4 FREE SERPL-MCNC: 1.25 NG/DL (ref 0.89–1.76)
T4 SERPL-MCNC: 7.6 MCG/DL (ref 4.7–11.4)
TRIGL SERPL-MCNC: 267 MG/DL (ref 0–150)
TSH SERPL DL<=0.05 MIU/L-ACNC: 0.15 MIU/ML (ref 0.35–5.35)

## 2017-11-16 PROCEDURE — 36415 COLL VENOUS BLD VENIPUNCTURE: CPT | Performed by: INTERNAL MEDICINE

## 2017-11-16 PROCEDURE — 84439 ASSAY OF FREE THYROXINE: CPT | Performed by: INTERNAL MEDICINE

## 2017-11-16 PROCEDURE — 80053 COMPREHEN METABOLIC PANEL: CPT | Performed by: INTERNAL MEDICINE

## 2017-11-16 PROCEDURE — 84479 ASSAY OF THYROID (T3 OR T4): CPT | Performed by: INTERNAL MEDICINE

## 2017-11-16 PROCEDURE — 84443 ASSAY THYROID STIM HORMONE: CPT | Performed by: INTERNAL MEDICINE

## 2017-11-16 PROCEDURE — 80061 LIPID PANEL: CPT | Performed by: INTERNAL MEDICINE

## 2017-11-16 PROCEDURE — 83036 HEMOGLOBIN GLYCOSYLATED A1C: CPT | Performed by: INTERNAL MEDICINE

## 2018-02-28 ENCOUNTER — OFFICE VISIT (OUTPATIENT)
Dept: CARDIOLOGY | Facility: CLINIC | Age: 73
End: 2018-02-28

## 2018-02-28 VITALS
HEART RATE: 76 BPM | DIASTOLIC BLOOD PRESSURE: 60 MMHG | HEIGHT: 64 IN | BODY MASS INDEX: 38.41 KG/M2 | SYSTOLIC BLOOD PRESSURE: 140 MMHG | WEIGHT: 225 LBS

## 2018-02-28 DIAGNOSIS — R06.02 SHORTNESS OF BREATH: Primary | ICD-10-CM

## 2018-02-28 DIAGNOSIS — Z79.4 TYPE 2 DIABETES MELLITUS WITHOUT COMPLICATION, WITH LONG-TERM CURRENT USE OF INSULIN (HCC): ICD-10-CM

## 2018-02-28 DIAGNOSIS — I77.9 BILATERAL CAROTID ARTERY DISEASE (HCC): ICD-10-CM

## 2018-02-28 DIAGNOSIS — I11.9 HYPERTENSIVE HEART DISEASE WITHOUT HEART FAILURE: ICD-10-CM

## 2018-02-28 DIAGNOSIS — IMO0001 CLASS 2 OBESITY DUE TO EXCESS CALORIES WITH SERIOUS COMORBIDITY AND BODY MASS INDEX (BMI) OF 38.0 TO 38.9 IN ADULT: ICD-10-CM

## 2018-02-28 DIAGNOSIS — E78.00 HYPERCHOLESTEREMIA: ICD-10-CM

## 2018-02-28 DIAGNOSIS — E11.9 TYPE 2 DIABETES MELLITUS WITHOUT COMPLICATION, WITH LONG-TERM CURRENT USE OF INSULIN (HCC): ICD-10-CM

## 2018-02-28 DIAGNOSIS — R06.2 WHEEZES: ICD-10-CM

## 2018-02-28 DIAGNOSIS — R00.2 PALPITATIONS: ICD-10-CM

## 2018-02-28 DIAGNOSIS — I27.20 PULMONARY HTN (HCC): ICD-10-CM

## 2018-02-28 DIAGNOSIS — R09.89 BRUIT OF LEFT CAROTID ARTERY: ICD-10-CM

## 2018-02-28 DIAGNOSIS — J44.9 COPD MIXED TYPE (HCC): ICD-10-CM

## 2018-02-28 DIAGNOSIS — I73.9 PVD (PERIPHERAL VASCULAR DISEASE) (HCC): ICD-10-CM

## 2018-02-28 DIAGNOSIS — I10 ESSENTIAL HYPERTENSION: ICD-10-CM

## 2018-02-28 PROCEDURE — 99214 OFFICE O/P EST MOD 30 MIN: CPT | Performed by: NURSE PRACTITIONER

## 2018-02-28 RX ORDER — FLUTICASONE PROPIONATE 110 UG/1
1 AEROSOL, METERED RESPIRATORY (INHALATION)
COMMUNITY
End: 2018-07-11 | Stop reason: ALTCHOICE

## 2018-02-28 NOTE — PROGRESS NOTES
Chief Complaint   Patient presents with   • Follow-up     For cardiac management. Reports that she does get chest pains when her heart gets up and occasionally it gets up with dressing. Is on steroids. Reports she has been having shortness of breath, relates to having the flu. Reports she has occasionally thinks she may have had some palpitations. Last lab work was done on 02/15/18 per Dr. Dangelo, in chart.    • Med Refill     PCP does medication refills.        Cardiac Complaints  Dyspnea, palpitations      Subjective   Bibiana Quezada is a 72 y.o. female with HTN, hypothyroidism, hyperlipidemia, COPD, and carotid artery disease and underwent surgery for the same in January 2014. She also has a history of numerous back surgeries. Due to issues with blood pressure in the past she was started on Lopressor and then clonidine. She later tapered and stopped these medications. Later her dose of Cardizem was increased secondary to palpitations and she found beneficial. In October 2016, she underwent repeat cardiac workup in the form of a stress test and echocardiogram. Possible apical ischemia was noted therefore cardiac catheterization was done. Results showed normal coronaries and normal LV function but  hypertensive heart disease. She continues to follow with Dr. Clayton in regard to carotid artery disease.  She comes today for follow up and reports continued issues with palpitations which she states feels like her heart racing.  She is continuing with low dose metoprolol at 12.5mg as she has not been able to tolerate higher dosing due to hypotension.  Her main problem seems to be continued shortness of breath which seems to be getting gradually worse, she is not sure if this is her COPD, or could be cardiac related. She admits to a lot of steroid use from lung issues with her COPD after the flu.  Recent labs from endocrine in February show , AIC 8.7%, creatinine 1.04, and K 4.2 (patient was not fasting).  No  refills are needed today as they are done with PCP.    Cardiac History  Past Surgical History:   Procedure Laterality Date   • CARDIAC CATHETERIZATION  10/24/2016    Normal Coronaries. Hypertensive Heart Disease.   • CARDIOVASCULAR STRESS TEST  10/12/2016    L. Myoview- Apical Ischemia   • CARDIOVASCULAR STRESS TEST  05/14/2013    L. Myoview- R/O Apical Ischemia.   • CARDIOVASCULAR STRESS TEST  12/14/2011     L. Myoview- (Fl) Negative    • CARDIOVASCULAR STRESS TEST  10/12/2016    Lexiscan- small apical ischemia, increase Imdur, cath   • CAROTID ENDARTERECTOMY  01/16/2014    (R) Endarterectomy by Dr. Clayton   • ECHO - CONVERTED  08/07/2012    EF 65%   • ECHO - CONVERTED  10/12/2016    EF 65%, mild MR, borderline PHT   •  CAROTID UNILATERAL  12/03/2013    @[facility]- Critical Stenosis  (R) ICA.       Current Outpatient Prescriptions   Medication Sig Dispense Refill   • albuterol (PROVENTIL HFA;VENTOLIN HFA) 108 (90 BASE) MCG/ACT inhaler Inhale 2 puffs Every 4 (Four) Hours As Needed for wheezing.     • aspirin 81 MG EC tablet Take 81 mg by mouth Daily.     • atorvastatin (LIPITOR) 10 MG tablet Take 10 mg by mouth Daily.     • diltiazem CD (CARDIZEM CD) 360 MG 24 hr capsule TAKE ONE CAPSULE BY MOUTH DAILY 90 capsule 2   • DULoxetine (CYMBALTA) 60 MG capsule Take 60 mg by mouth Daily.     • esomeprazole (nexIUM) 40 MG capsule Take 40 mg by mouth Every Morning Before Breakfast.     • fluticasone (FLOVENT HFA) 110 MCG/ACT inhaler Inhale 1 puff 2 (Two) Times a Day.     • furosemide (LASIX) 40 MG tablet Take 40 mg by mouth Daily.     • gabapentin (NEURONTIN) 300 MG capsule Take 300 mg by mouth 2 (Two) Times a Day.     • Insulin Lispro (HUMALOG SC) Inject  under the skin.     • isosorbide mononitrate (IMDUR) 60 MG 24 hr tablet Take 1 tablet by mouth Every Morning. 90 tablet 1   • levothyroxine (SYNTHROID, LEVOTHROID) 137 MCG tablet Take 137 mcg by mouth Daily.     • losartan (COZAAR) 100 MG tablet Take 100 mg by mouth Daily.      • metoprolol tartrate (LOPRESSOR) 25 MG tablet Take 12.5 mg by mouth 2 (Two) Times a Day.     • Multiple Vitamin (MULTI VITAMIN DAILY PO) Take  by mouth.     • pioglitazone (ACTOS) 30 MG tablet Take 30 mg by mouth Daily.     • vitamin D (ERGOCALCIFEROL) 58450 UNITS capsule capsule Take 50,000 Units by mouth. Decrease to once a month dose       No current facility-administered medications for this visit.        Augmentin [amoxicillin-pot clavulanate]; Codeine; Demerol [meperidine]; Hydrocodone; Lescol [fluvastatin sodium]; Livalo [pitavastatin]; Morphine and related; Other; Percocet [oxycodone-acetaminophen]; Percodan [oxycodone-aspirin]; Pravachol [pravastatin sodium]; Talwin [pentazocine]; Tape; Ultram [tramadol hcl]; Zetia [ezetimibe]; and Zocor [simvastatin]    Past Medical History:   Diagnosis Date   • Carotid artery disease    • Carpal tunnel syndrome    • COPD (chronic obstructive pulmonary disease)     asthma, chronic bronchitis   • Diabetes    • GERD (gastroesophageal reflux disease)      s/p surgery for hiatal hernia   • History of cholecystectomy    • HTN (hypertension)    • Hypercholesterolemia    • Palpitations    • Previous back surgery     x5   • S/P GA (total abdominal hysterectomy)    • TIA (transient ischemic attack)        Social History     Social History   • Marital status:      Spouse name: N/A   • Number of children: N/A   • Years of education: N/A     Occupational History   • Not on file.     Social History Main Topics   • Smoking status: Former Smoker   • Smokeless tobacco: Never Used   • Alcohol use Yes      Comment: ssocially, drinks one drink, screwdriver per day.    • Drug use: No   • Sexual activity: Not on file     Other Topics Concern   • Not on file     Social History Narrative       Family History   Problem Relation Age of Onset   • Heart attack Mother    • Pancreatic cancer Father    • Other Brother      GABG   • Hypertension Other        Review of Systems   Constitution:  "Negative for weakness and malaise/fatigue.   Cardiovascular: Positive for dyspnea on exertion and palpitations. Negative for chest pain, leg swelling, orthopnea and syncope.   Respiratory: Positive for shortness of breath. Negative for cough and wheezing.    Musculoskeletal: Negative for back pain, joint pain and joint swelling.   Gastrointestinal: Negative for anorexia, heartburn and jaundice.   Genitourinary: Negative for dysuria, hesitancy and nocturia.   Neurological: Negative for dizziness, light-headedness and loss of balance.   Psychiatric/Behavioral: Negative for depression and memory loss. The patient is not nervous/anxious.        DiabetesYes  Thyroidabnormal    Objective     /60 (BP Location: Left arm)  Pulse 76  Ht 162.6 cm (64.02\")  Wt 102 kg (225 lb)  BMI 38.6 kg/m2    Physical Exam   Constitutional: She is oriented to person, place, and time. She appears well-developed and well-nourished.   HENT:   Head: Normocephalic and atraumatic.   Eyes: EOM are normal. Pupils are equal, round, and reactive to light.   Neck: Normal range of motion. Neck supple.   Cardiovascular: Normal rate and regular rhythm.    Murmur heard.  Pulmonary/Chest: Effort normal. She has wheezes.   Abdominal: Soft.   Musculoskeletal: Normal range of motion.   Neurological: She is alert and oriented to person, place, and time.   Skin: Skin is warm and dry.   Psychiatric: She has a normal mood and affect. Her behavior is normal.       Procedures    Assessment/Plan     HR is stable at 76.  BP stable at 140/60. She does continue to have palpitations but has not been able to tolerate higher dosing of metoprolol as it bottoms her blood pressure out.  We will try and get bystolic approved as she tolerated well and denies side effects from it. I will try and see if there is a medication assistance program, or if it can be pre-authorized.  She does admit to more shortness of breath which is slowly getting worse.  We will advise on " repeat echo to assess for any worsening pulmonary HTN.  More recommendations to follow. She is having a repeat carotid US in March with Dr. Clayton and was advised to call to have MANNIE added with his office as she still reports pain and burning with walking, indicative of claudication that gets better with rest. For lipid management, she continues on lipitor.  Most recent LDL high at 106, but she admits to non-fasting.  For now, no adjustment will be made.  For pulmonary concerns, she continues to follow with [facility] and states she has been taking a lot of steroids recently.  Most recent AIC reflected this seen at 8.7%.  She continues to follow with endocrine in regards.  Limited starch and sugar intake encouraged as BMI remains elevated at 38.  6 month follow up scheduled or sooner if needed.       Problems Addressed this Visit        Cardiovascular and Mediastinum    Hypertensive heart disease without heart failure    Relevant Medications    metoprolol tartrate (LOPRESSOR) 25 MG tablet    Essential hypertension    Relevant Medications    metoprolol tartrate (LOPRESSOR) 25 MG tablet    Hypercholesteremia    Bilateral carotid artery disease    PVD (peripheral vascular disease)       Respiratory    Wheezes      Other Visit Diagnoses     Shortness of breath    -  Primary    Relevant Orders    Adult Transthoracic Echo Complete W/ Cont if Necessary Per Protocol    Pulmonary HTN        Relevant Orders    Adult Transthoracic Echo Complete W/ Cont if Necessary Per Protocol    Bruit of left carotid artery        Type 2 diabetes mellitus without complication, with long-term current use of insulin        COPD mixed type        Relevant Medications    fluticasone (FLOVENT HFA) 110 MCG/ACT inhaler    Class 2 obesity due to excess calories with serious comorbidity and body mass index (BMI) of 38.0 to 38.9 in adult              Patient's BMI is above normal parameters. Follow-up plan includes:  nutrition  counseling.        Electronically signed by Nano Hicks, RENETTA February 28, 2018 4:48 PM

## 2018-03-01 ENCOUNTER — TELEPHONE (OUTPATIENT)
Dept: CARDIOLOGY | Facility: CLINIC | Age: 73
End: 2018-03-01

## 2018-03-01 DIAGNOSIS — I73.9 CLAUDICATION (HCC): Primary | ICD-10-CM

## 2018-03-01 DIAGNOSIS — R60.0 LOCALIZED EDEMA: ICD-10-CM

## 2018-03-01 NOTE — TELEPHONE ENCOUNTER
Dr. Jimenez office called to inform us that this patients appointment of 3/14/18 had to be canceled as patient being presented with new symptoms, which would make patient a new consult. She has an upcoming appointment for a carotid u/s but could not do the MANNIE requested by Nano. Dr. Clayton office to inform patient.

## 2018-03-02 NOTE — TELEPHONE ENCOUNTER
Patient was made aware of appointment time for MANNIE at [facility]. She said that since Dr. Clayton cancelled her appointment this month and is leaving this month, she is wondering if you could refer her to someone at [facility]. She also wants to know if she would have to have a carotid US to be referred to them?

## 2018-03-14 NOTE — TELEPHONE ENCOUNTER
Patient aware. She is scheduled for CTA on 03/19/18 at 9:30 am at [facility]. She was told to be NPO after midnight.

## 2018-03-15 NOTE — TELEPHONE ENCOUNTER
Patient asked if we had anything on an assistance program for Bystolic since you had mentioned switching her to Bystolic from metoprolol.

## 2018-04-03 NOTE — TELEPHONE ENCOUNTER
I do not think so.  We may have samples, but that would be it, and we do not get them that often. <<-----Click on this checkbox to enter Post-Op Dx

## 2018-06-28 ENCOUNTER — TRANSCRIBE ORDERS (OUTPATIENT)
Dept: LAB | Facility: HOSPITAL | Age: 73
End: 2018-06-28

## 2018-06-28 ENCOUNTER — LAB (OUTPATIENT)
Dept: LAB | Facility: HOSPITAL | Age: 73
End: 2018-06-28

## 2018-06-28 DIAGNOSIS — E11.8 DIABETIC COMPLICATION (HCC): ICD-10-CM

## 2018-06-28 DIAGNOSIS — E03.9 MYXEDEMA HEART DISEASE: ICD-10-CM

## 2018-06-28 DIAGNOSIS — I51.9 MYXEDEMA HEART DISEASE: ICD-10-CM

## 2018-06-28 DIAGNOSIS — E78.2 MIXED HYPERLIPIDEMIA: ICD-10-CM

## 2018-06-28 DIAGNOSIS — E11.8 DIABETIC COMPLICATION (HCC): Primary | ICD-10-CM

## 2018-06-28 LAB
ALBUMIN SERPL-MCNC: 4.45 G/DL (ref 3.2–4.8)
ALBUMIN/GLOB SERPL: 2 G/DL (ref 1.5–2.5)
ALP SERPL-CCNC: 166 U/L (ref 25–100)
ALT SERPL W P-5'-P-CCNC: 32 U/L (ref 7–40)
ANION GAP SERPL CALCULATED.3IONS-SCNC: 9 MMOL/L (ref 3–11)
ARTICHOKE IGE QN: 114 MG/DL (ref 0–130)
AST SERPL-CCNC: 33 U/L (ref 0–33)
BILIRUB SERPL-MCNC: 0.5 MG/DL (ref 0.3–1.2)
BUN BLD-MCNC: 17 MG/DL (ref 9–23)
BUN/CREAT SERPL: 13.9 (ref 7–25)
CALCIUM SPEC-SCNC: 9.4 MG/DL (ref 8.7–10.4)
CHLORIDE SERPL-SCNC: 101 MMOL/L (ref 99–109)
CHOLEST SERPL-MCNC: 160 MG/DL (ref 0–200)
CO2 SERPL-SCNC: 28 MMOL/L (ref 20–31)
CREAT BLD-MCNC: 1.22 MG/DL (ref 0.6–1.3)
GFR SERPL CREATININE-BSD FRML MDRD: 44 ML/MIN/1.73
GLOBULIN UR ELPH-MCNC: 2.3 GM/DL
GLUCOSE BLD-MCNC: 206 MG/DL (ref 70–100)
HBA1C MFR BLD: 7.8 % (ref 4.8–5.6)
HDLC SERPL-MCNC: 39 MG/DL (ref 40–60)
POTASSIUM BLD-SCNC: 4.8 MMOL/L (ref 3.5–5.5)
PROT SERPL-MCNC: 6.7 G/DL (ref 5.7–8.2)
SODIUM BLD-SCNC: 138 MMOL/L (ref 132–146)
T3RU NFR SERPL: 33.5 % (ref 23–37)
T4 FREE SERPL-MCNC: 1.11 NG/DL (ref 0.89–1.76)
T4 SERPL-MCNC: 7.4 MCG/DL (ref 4.7–11.4)
TRIGL SERPL-MCNC: 213 MG/DL (ref 0–150)
TSH SERPL DL<=0.05 MIU/L-ACNC: 0.07 MIU/ML (ref 0.35–5.35)

## 2018-06-28 PROCEDURE — 83036 HEMOGLOBIN GLYCOSYLATED A1C: CPT

## 2018-06-28 PROCEDURE — 80061 LIPID PANEL: CPT

## 2018-06-28 PROCEDURE — 84439 ASSAY OF FREE THYROXINE: CPT

## 2018-06-28 PROCEDURE — 84443 ASSAY THYROID STIM HORMONE: CPT

## 2018-06-28 PROCEDURE — 36415 COLL VENOUS BLD VENIPUNCTURE: CPT

## 2018-06-28 PROCEDURE — 84479 ASSAY OF THYROID (T3 OR T4): CPT

## 2018-06-28 PROCEDURE — 80053 COMPREHEN METABOLIC PANEL: CPT

## 2018-07-11 ENCOUNTER — TELEPHONE (OUTPATIENT)
Dept: CARDIOLOGY | Facility: CLINIC | Age: 73
End: 2018-07-11

## 2018-07-11 ENCOUNTER — OFFICE VISIT (OUTPATIENT)
Dept: CARDIOLOGY | Facility: CLINIC | Age: 73
End: 2018-07-11

## 2018-07-11 VITALS
WEIGHT: 223 LBS | HEART RATE: 84 BPM | HEIGHT: 64 IN | BODY MASS INDEX: 38.07 KG/M2 | DIASTOLIC BLOOD PRESSURE: 60 MMHG | SYSTOLIC BLOOD PRESSURE: 170 MMHG

## 2018-07-11 DIAGNOSIS — J44.9 CHRONIC OBSTRUCTIVE PULMONARY DISEASE, UNSPECIFIED COPD TYPE (HCC): ICD-10-CM

## 2018-07-11 DIAGNOSIS — I11.9 HYPERTENSIVE HEART DISEASE WITHOUT HEART FAILURE: Primary | ICD-10-CM

## 2018-07-11 DIAGNOSIS — I10 ESSENTIAL HYPERTENSION: ICD-10-CM

## 2018-07-11 DIAGNOSIS — I77.9 BILATERAL CAROTID ARTERY DISEASE (HCC): ICD-10-CM

## 2018-07-11 DIAGNOSIS — G47.30 SLEEP APNEA IN ADULT: ICD-10-CM

## 2018-07-11 DIAGNOSIS — Z79.4 CONTROLLED TYPE 2 DIABETES MELLITUS WITHOUT COMPLICATION, WITH LONG-TERM CURRENT USE OF INSULIN (HCC): ICD-10-CM

## 2018-07-11 DIAGNOSIS — E78.00 HYPERCHOLESTEREMIA: ICD-10-CM

## 2018-07-11 DIAGNOSIS — E11.9 CONTROLLED TYPE 2 DIABETES MELLITUS WITHOUT COMPLICATION, WITH LONG-TERM CURRENT USE OF INSULIN (HCC): ICD-10-CM

## 2018-07-11 DIAGNOSIS — I73.9 PVD (PERIPHERAL VASCULAR DISEASE) (HCC): ICD-10-CM

## 2018-07-11 PROCEDURE — 99214 OFFICE O/P EST MOD 30 MIN: CPT | Performed by: NURSE PRACTITIONER

## 2018-07-11 RX ORDER — PRIMIDONE 50 MG/1
50 TABLET ORAL NIGHTLY
COMMUNITY
End: 2019-09-11 | Stop reason: ALTCHOICE

## 2018-07-11 RX ORDER — NEBIVOLOL 5 MG/1
5 TABLET ORAL DAILY
Qty: 90 TABLET | Refills: 3 | Status: SHIPPED | OUTPATIENT
Start: 2018-07-11 | End: 2019-08-01 | Stop reason: SDUPTHER

## 2018-07-11 NOTE — PROGRESS NOTES
Chief Complaint   Patient presents with   • Follow-up     Cardiac management. She reports went to ER in [place] last week due to elevated HR and O2 sat 70-93. She reports got call this morning that blood cultures show infection. She reports B/P is fluctuating, notes more with systolic elevated and diastolic low. She reports not tolerating Metoprolol very well, she is requesting change.   • Shortness of Breath     Having increase in shortness of breath for the last couple months, follows with Dr Fisher. Wears CPAP at night.   • Dizziness     Notes more when O2 is low.   • Palpitations     She reports about the same as before.   • Chest Pain     She reports having some tightness and sharp mid chest pain that radiates to center of back. She reports Nitro relieves the pain.   • Med Refill     will need refill on Metoprolol if it continues-90 day.       Subjective       Bibiana Quezada is a 72 y.o. female with HTN, hypothyroidism, hyperlipidemia, COPD, and carotid artery disease s/p (R)CEA in January 2014. She also has a history of numerous back surgeries. She is followed by Dr. Fisher secondary to asthma/bronchitis after inhaling noxious chemicals many years ago. Sleep apnea managed with CPAP. Her blood pressure has been labile and medication changes have been made. In October 2016, repeat cardiac workup showed apical ischemia with echocardiogram with EF 65%, LVH and PA pressure 37 mmHg. Cardiac cath showed normal coronaries and hypertensive heart disease. She is now followed by Dr. Marrero in [place] regarding the carotid and peripheral disease.  Recent CTA showed around 50% stenosis at the bifurcation with medical management continued. Carotid US recently obtained and stable per patient. Dr. Dangelo, endocrinologist manages labs with most recent on 2/15/18 showing cholesterol 186, HDL 46, Tri 226, and .  A1C 8.7%. BUN/Cr 15/1.04. TSH low at 0.26.     She came in today to be evaluated for increasing blood pressure  and heart rate and worsening shortness of breath. The SOB has worsened over the last few days to weeks. She has occasional cough, no fever. Sats monitored at home range from 75-90% according to her. She was seen in the ER at [place] on 6/28/18 with hypoxia treated with steroids and improved somewhat. She was told this morning blood cultures were positive.  She plans to see PCP tomorrow and Dr. Fisher next week. BP at home ranges from 99/79 to 170/72 but typically on the high side. HR remains in the 80-90. She does not tolerate metoprolol stating that 25 mg BID causes dizziness. She has taken Bystolic in the past which worked for her but insurance didn't cover well.     HPI         Cardiac History:    Past Surgical History:   Procedure Laterality Date   • CARDIAC CATHETERIZATION  10/24/2016    Normal Coronaries. Hypertensive Heart Disease.   • CARDIOVASCULAR STRESS TEST  10/12/2016    L. Myoview- Apical Ischemia   • CARDIOVASCULAR STRESS TEST  05/14/2013    L. Myoview- R/O Apical Ischemia.   • CARDIOVASCULAR STRESS TEST  12/14/2011     L. Myoview- (Fl) Negative    • CARDIOVASCULAR STRESS TEST  10/12/2016    Lexiscan- small apical ischemia, increase Imdur, cath   • CAROTID ENDARTERECTOMY  01/16/2014    (R) Endarterectomy by Dr. Clayton   • ECHO - CONVERTED  08/07/2012    EF 65%   • ECHO - CONVERTED  10/12/2016    EF 65%, mild MR, borderline PHT   • ECHO - CONVERTED  03/13/2018    @[facility] [place]. EF 65%. RVSP_ 55 mmHg   • US CAROTID UNILATERAL  12/03/2013    @[facility] [place]- Critical Stenosis  (R) ICA.       Current Outpatient Prescriptions   Medication Sig Dispense Refill   • albuterol (PROVENTIL HFA;VENTOLIN HFA) 108 (90 BASE) MCG/ACT inhaler Inhale 2 puffs Every 4 (Four) Hours As Needed for wheezing.     • aspirin 81 MG EC tablet Take 81 mg by mouth Daily.     • atorvastatin (LIPITOR) 10 MG tablet Take 10 mg by mouth Daily.     • Cholecalciferol (VITAMIN D3) 5000 units capsule capsule Take 5,000 Units by mouth 1 (One) Time Per Week.      • diltiazem CD (CARDIZEM CD) 360 MG 24 hr capsule TAKE ONE CAPSULE BY MOUTH DAILY 90 capsule 2   • DULoxetine (CYMBALTA) 60 MG capsule Take 60 mg by mouth Daily.     • esomeprazole (nexIUM) 40 MG capsule Take 40 mg by mouth Every Morning Before Breakfast.     • Fluticasone Furoate-Vilanterol (BREO ELLIPTA IN) Inhale Daily.     • furosemide (LASIX) 40 MG tablet Take 40 mg by mouth Daily.     • gabapentin (NEURONTIN) 300 MG capsule 1 capsule in am and 2 capsules at night     • Insulin Lispro (HUMALOG SC) Inject  under the skin.     • isosorbide mononitrate (IMDUR) 60 MG 24 hr tablet Take 1 tablet by mouth Every Morning. 90 tablet 1   • levothyroxine (SYNTHROID, LEVOTHROID) 137 MCG tablet Take 137 mcg by mouth Daily.     • Multiple Vitamin (MULTI VITAMIN DAILY PO) Take  by mouth.     • pioglitazone (ACTOS) 30 MG tablet Take 30 mg by mouth Daily.     • primidone (MYSOLINE) 50 MG tablet Take 50 mg by mouth Every Night.     • azilsartan medoxomil (EDARBI) 80 MG tablet tablet Take 1 tablet by mouth Daily. 90 tablet 3   • nebivolol (BYSTOLIC) 5 MG tablet Take 1 tablet by mouth Daily. 90 tablet 3     No current facility-administered medications for this visit.        Augmentin [amoxicillin-pot clavulanate]; Codeine; Demerol [meperidine]; Hydrocodone; Lescol [fluvastatin sodium]; Livalo [pitavastatin]; Morphine and related; Percocet [oxycodone-acetaminophen]; Percodan [oxycodone-aspirin]; Pravachol [pravastatin sodium]; Talwin [pentazocine]; Ultram [tramadol hcl]; Zetia [ezetimibe]; Zocor [simvastatin]; Other; and Tape    Past Medical History:   Diagnosis Date   • Carotid artery disease (CMS/HCC)    • Carpal tunnel syndrome    • COPD (chronic obstructive pulmonary disease) (CMS/HCC)     asthma, chronic bronchitis   • Diabetes (CMS/HCC)    • GERD (gastroesophageal reflux disease)      s/p surgery for hiatal hernia   • History of cholecystectomy    • HTN (hypertension)    • Hypercholesterolemia    • Palpitations    • Previous  "back surgery     x5   • S/P GA (total abdominal hysterectomy)    • Sleep apnea     wears CPAP   • TIA (transient ischemic attack)        Social History     Social History   • Marital status:      Spouse name: N/A   • Number of children: N/A   • Years of education: N/A     Occupational History   • Not on file.     Social History Main Topics   • Smoking status: Former Smoker   • Smokeless tobacco: Never Used   • Alcohol use Yes      Comment: ssocially, drinks one drink, screwdriver per day.    • Drug use: No   • Sexual activity: Not on file     Other Topics Concern   • Not on file     Social History Narrative   • No narrative on file       Family History   Problem Relation Age of Onset   • Heart attack Mother    • Pancreatic cancer Father    • Other Brother         GABG   • Hypertension Other        Review of Systems   Constitution: Positive for weakness, malaise/fatigue and weight loss (down 2lb). Negative for decreased appetite.   HENT: Negative.    Eyes: Negative.    Cardiovascular: Positive for chest pain and dyspnea on exertion. Negative for leg swelling (occasional, not presently ), orthopnea, palpitations and syncope.   Respiratory: Positive for cough, shortness of breath and sleep disturbances due to breathing (uses CPAP ). Negative for hemoptysis, sputum production and wheezing.    Endocrine: Negative.    Hematologic/Lymphatic: Negative for bleeding problem. Does not bruise/bleed easily.   Skin: Negative.    Musculoskeletal: Positive for arthritis, back pain and joint pain. Negative for myalgias.   Gastrointestinal: Negative for abdominal pain.   Genitourinary: Negative for dysuria and hematuria.   Neurological: Negative for dizziness.   Psychiatric/Behavioral: Negative for altered mental status and depression.   Allergic/Immunologic: Negative.       Diabetes- Yes  Thyroid-abnormal    Objective     /60 (BP Location: Right arm)   Pulse 84   Ht 162.6 cm (64.02\")   Wt 101 kg (223 lb)   BMI " 38.26 kg/m²     Physical Exam   Constitutional: She is oriented to person, place, and time. She appears well-developed and well-nourished.   HENT:   Head: Normocephalic.   Eyes: Pupils are equal, round, and reactive to light.   Neck: Normal range of motion.   Cardiovascular: Normal rate, regular rhythm and intact distal pulses.    Murmur heard.  Pulmonary/Chest: She is in respiratory distress (respiratory rate rapid but not labored at rest ). She has no wheezes.   Abdominal: Soft.   Musculoskeletal: Normal range of motion. She exhibits no edema.   Neurological: She is alert and oriented to person, place, and time.   Skin: Skin is warm and dry.   Psychiatric: She has a normal mood and affect.   Nursing note and vitals reviewed.     Procedures          Assessment/Plan    Heart rate stable. Blood pressure elevated.  Reviewed bp log from home.  Will change losartan 100 mg to Edarbi 80 mg daily and metoprolol to Bystolic 5 mg daily.  Will try to get prior authorized if needed.  She was advised to stop by [facility] office for samples until then. We reviewed the findings of her cardiac cath which showed normal coronaries. The recent echocardiogram did show increase in PA pressure to 55 mmHg. Will try to obtain records from [facility]. She is advised to follow up with Dr. Ramirez regarding blood cultures and plans to see Dr. Fisher next week for fu. She may need supplemental O2. Discussed with Dr. Cano, If symptoms persist despite pulmonary management, she may need to undergo right heart cath to further evaluate pulmonary hypertension.  Will request she come to [facility] in two weeks for bp check and response to Edarbi and Bystolic. Labs managed per endocrinology and Dr. Ramirez.   in February.  May need to increase Lipitor to 20 mg if she can tolerate for better lipid control. A1C remains elevated. Limit carbs, sugars. Body mass index is 38.26 kg/m². Weight management and calorie reduction  encouraged. She was given a follow up for six months but may be seen sooner depending on response.   Bibiana was seen today for follow-up, shortness of breath, dizziness, palpitations, chest pain and med refill.    Diagnoses and all orders for this visit:    Hypertensive heart disease without heart failure    Essential hypertension    Hypercholesteremia    Bilateral carotid artery disease (CMS/HCC)    PVD (peripheral vascular disease) (CMS/[facility])    Controlled type 2 diabetes mellitus without complication, with long-term current use of insulin (CMS/[facility])    Sleep apnea in adult    Chronic obstructive pulmonary disease, unspecified COPD type (CMS/[facility])    Other orders  -     azilsartan medoxomil (EDARBI) 80 MG tablet tablet; Take 1 tablet by mouth Daily.  -     nebivolol (BYSTOLIC) 5 MG tablet; Take 1 tablet by mouth Daily.        Patient's Body mass index is 38.26 kg/m². BMI is above normal parameters. Recommendations include: nutrition counseling.                 Electronically signed by RENETTA Eng,  July 11, 2018 4:02 PM

## 2018-07-11 NOTE — TELEPHONE ENCOUNTER
Attempted to make patient aware of recommendations, unable to reach, left message for patient to call back.

## 2018-07-11 NOTE — TELEPHONE ENCOUNTER
Nani,    We are trying to obtain records from [facility] in [place].    I did discuss the shortness of breath with Dr. Cano.  If she sees Dr. Ramirez and Dr. Fisher and they feel SOB is cardiac, will plan for right heart cath. He agreed with Edarbi and Bystolic.     Can she come by [facility] in two weeks for bp check and we can see how she is doing?    Thank you

## 2018-07-12 NOTE — TELEPHONE ENCOUNTER
Attempted to notify patient of recommendations, unable to reach via phone, left message for patient to call back.

## 2018-07-13 NOTE — TELEPHONE ENCOUNTER
Patient made aware of recommendations and to be at [facility] 8:15am 7/25/18 for B/P check, patient verbalized understanding.

## 2018-07-25 ENCOUNTER — TELEPHONE (OUTPATIENT)
Dept: CARDIOLOGY | Facility: CLINIC | Age: 73
End: 2018-07-25

## 2018-07-25 ENCOUNTER — CLINICAL SUPPORT (OUTPATIENT)
Dept: CARDIOLOGY | Facility: CLINIC | Age: 73
End: 2018-07-25

## 2018-07-25 VITALS — HEART RATE: 76 BPM | SYSTOLIC BLOOD PRESSURE: 140 MMHG | DIASTOLIC BLOOD PRESSURE: 60 MMHG

## 2018-07-25 DIAGNOSIS — Z01.30 BLOOD PRESSURE CHECK: Primary | ICD-10-CM

## 2018-07-26 ENCOUNTER — TELEPHONE (OUTPATIENT)
Dept: CARDIOLOGY | Facility: CLINIC | Age: 73
End: 2018-07-26

## 2018-08-02 ENCOUNTER — TRANSCRIBE ORDERS (OUTPATIENT)
Dept: DIABETES SERVICES | Facility: HOSPITAL | Age: 73
End: 2018-08-02

## 2018-08-02 DIAGNOSIS — E10.8 TYPE 1 DIABETES MELLITUS WITH COMPLICATION (HCC): Primary | ICD-10-CM

## 2018-08-08 ENCOUNTER — HOSPITAL ENCOUNTER (OUTPATIENT)
Dept: DIABETES SERVICES | Facility: HOSPITAL | Age: 73
Setting detail: RECURRING SERIES
Discharge: HOME | End: 2018-08-08

## 2018-08-15 ENCOUNTER — TELEPHONE (OUTPATIENT)
Dept: CARDIOLOGY | Facility: CLINIC | Age: 73
End: 2018-08-15

## 2018-08-15 NOTE — TELEPHONE ENCOUNTER
Dr. Cano,    Ms. Quezada came to [facility] today. I saw her recently for worsening SOB. Cath in 2016 showed normal coronaries and hypertensive heart disease. SOB is progressively worsening. Last echo 3/13/18 PA pressure 55 mmHg. We discussed right heart cath.     She came today very SOB asking if she could have cath. Dr. Fisher has requested also.     Is this ok?

## 2018-08-16 NOTE — TELEPHONE ENCOUNTER
Suspicion for CAD is low. Normal coronaries in 2016. SOB is CC. Probable just right, but can we ask Dr. Cano?    T-    Right heart cath only?

## 2018-08-21 ENCOUNTER — OUTSIDE FACILITY SERVICE (OUTPATIENT)
Dept: CARDIOLOGY | Facility: CLINIC | Age: 73
End: 2018-08-21

## 2018-08-21 PROCEDURE — 93567 NJX CAR CTH SPRVLV AORTGRPHY: CPT | Performed by: INTERNAL MEDICINE

## 2018-08-21 PROCEDURE — 93460 R&L HRT ART/VENTRICLE ANGIO: CPT | Performed by: INTERNAL MEDICINE

## 2018-08-30 ENCOUNTER — TELEPHONE (OUTPATIENT)
Dept: CARDIOLOGY | Facility: CLINIC | Age: 73
End: 2018-08-30

## 2018-08-30 DIAGNOSIS — R79.89 ELEVATED D-DIMER: Primary | ICD-10-CM

## 2018-08-30 NOTE — TELEPHONE ENCOUNTER
"Patient called stating Dr Fisher sent her to hospital to have D Dimer obtained with elevated results 8/28/18 and she then had CTA of chest 8/28/18. She has questions concerning why D Dimer is elevated since she had no pulmonary embolism, she is questioning if it could be \"clot in legs\" since she has pain in both legs and occasional swelling. She has called Dr Fisher office, awaiting return call. Copy of labs, CTA of chest and cardiac cath placed on your desk.  Any further recommendations? Thanks     Current medications  Prednisone 10mg daily  Singular 10mg daily  Chlorthalidone 25mg daily  Edarbi 80mg daily  Bystolic 5mg daily  Diltiazem CD 360mg daily  Aspirin 81mg daily  Lipitor 10mg daily  Lasix 40mg daily      "

## 2018-08-30 NOTE — TELEPHONE ENCOUNTER
I would check with Dr. Fisher since he is working this up. Other things besides DVT can cause elevation in D-dimer. Even the cath 1 week prior could cause.     We can recheck D-dimer next week to see if coming down. If she has evidence of DVT- unilateral leg edema, pain, redness, then US can be ordered. If not, then monitor. ER if problems develop over the holiday.

## 2018-10-01 NOTE — PROGRESS NOTES
Chief Complaint   Patient presents with   • Hospital follow up     recent cath, right and left, PHT with gradient of 30 mmHg, normal coronaries.  Imdur stopped, chlorthalidone added   • Abnormal Lab     slightly elevated D Dimer just a few days after the cath ( Dr Fisher checked), negative for PE. Significantly lower after rechecking a week later.    • Med Refill     pt asking for refills of Edarbi be sent to [facility]   • Cardiac clearance     to see surgeon on Oct 23 regarding shoulder surgery, adivsed her to have surgeon send us request    • Dizziness     randomly occurs, seems worse since starting the chlorthalidone, /s 60's.    • Shortness of Breath     has improved       Subjective       Bibiana Quezada is a 73 y.o. female  with HTN, hypothyroidism, hyperlipidemia, COPD, and carotid artery disease s/p (R)CEA in January 2014. She also has a history of numerous back surgeries. She is followed by Dr. Fisher secondary to asthma/bronchitis after inhaling noxious chemicals many years ago. Sleep apnea managed with CPAP. Her blood pressure has been labile and medication changes have been made. In October 2016, repeat cardiac workup showed apical ischemia with echocardiogram with EF 65%, LVH and PA pressure 37 mmHg. Cardiac cath showed normal coronaries and hypertensive heart disease. She is followed by Dr. Marrero in [place] regarding the carotid and peripheral disease.  CTA showed around 50% stenosis at the bifurcation. Carotid US stable per patient. Dr. Dangelo, endocrinologist manages her labs. At her July 2018 office visit, she had more shortness of breath and elevated BP. Losartan stopped and Edarbi started. Increase dose of statin advised. On 8/21/18, she underwent right and left heart cath that showed significant systemic hypertension for which chlorthalidone was added. PA pressure high to consider pulmonary hypertension management. CTA of chest 8/28/18 done secondary to chest pain and D-dimer was  negative for embolism or other significant abnormality.     Today she comes to the office for a hospital follow up visit. She reports improvement of shortness of breath. She admits to mild dizziness, mostly when blowing nose or position change. No chest pain noted. She does plan to have shoulder surgery in near future.     HPI     Cardiac History:    Past Surgical History:   Procedure Laterality Date   • CARDIAC CATHETERIZATION  10/24/2016    Normal Coronaries. Hypertensive Heart Disease.   • CARDIAC CATHETERIZATION  08/21/2018    Normal Coronaries. EF 65%. PA- 60/28(44). LVEDP- 14. PCW 29.   • CARDIOVASCULAR STRESS TEST  10/12/2016    L. Myoview- Apical Ischemia   • CARDIOVASCULAR STRESS TEST  05/14/2013    L. Myoview- R/O Apical Ischemia.   • CARDIOVASCULAR STRESS TEST  12/14/2011     L. Myoview- (Fl) Negative    • CARDIOVASCULAR STRESS TEST  10/12/2016    Lexiscan- small apical ischemia, increase Imdur, cath   • CAROTID ENDARTERECTOMY  01/16/2014    (R) Endarterectomy by Dr. Clayton   • ECHO - CONVERTED  08/07/2012    EF 65%   • ECHO - CONVERTED  10/12/2016    EF 65%, mild MR, borderline PHT   • ECHO - CONVERTED  03/13/2018    @[facility]. EF 65%. RVSP_ 55 mmHg   • US CAROTID UNILATERAL  12/03/2013    @[facility]- Critical Stenosis  (R) ICA.       Current Outpatient Prescriptions   Medication Sig Dispense Refill   • albuterol (PROVENTIL HFA;VENTOLIN HFA) 108 (90 BASE) MCG/ACT inhaler Inhale 2 puffs Every 4 (Four) Hours As Needed for wheezing.     • aspirin 81 MG EC tablet Take 81 mg by mouth Daily.     • atorvastatin (LIPITOR) 10 MG tablet Take 10 mg by mouth Daily.     • azilsartan medoxomil (EDARBI) 80 MG tablet tablet Take 1 tablet by mouth Daily. 90 tablet 3   • chlorthalidone (HYGROTON) 25 MG tablet Take 25 mg by mouth Daily.     • Cholecalciferol (VITAMIN D3) 5000 units capsule capsule Take 5,000 Units by mouth 1 (One) Time Per Week.     • diltiazem CD (CARDIZEM CD) 360 MG 24 hr capsule TAKE ONE CAPSULE BY MOUTH DAILY 90  capsule 2   • DULoxetine (CYMBALTA) 60 MG capsule Take 60 mg by mouth Daily.     • esomeprazole (nexIUM) 40 MG capsule Take 40 mg by mouth 2 (Two) Times a Day.     • Fluticasone Furoate-Vilanterol (BREO ELLIPTA IN) Inhale Daily.     • furosemide (LASIX) 40 MG tablet Take 40 mg by mouth Daily.     • gabapentin (NEURONTIN) 300 MG capsule 1 capsule in am and 2 capsules at night     • Insulin Lispro (HUMALOG SC) Inject  under the skin.     • levothyroxine (SYNTHROID, LEVOTHROID) 137 MCG tablet Take 137 mcg by mouth Daily.     • Multiple Vitamin (MULTI VITAMIN DAILY PO) Take  by mouth.     • nebivolol (BYSTOLIC) 5 MG tablet Take 1 tablet by mouth Daily. 90 tablet 3   • pioglitazone (ACTOS) 30 MG tablet Take 30 mg by mouth Daily.     • primidone (MYSOLINE) 50 MG tablet Take 50 mg by mouth Every Night.       No current facility-administered medications for this visit.        Augmentin [amoxicillin-pot clavulanate]; Codeine; Demerol [meperidine]; Hydrocodone; Lescol [fluvastatin sodium]; Livalo [pitavastatin]; Morphine and related; Percocet [oxycodone-acetaminophen]; Percodan [oxycodone-aspirin]; Pravachol [pravastatin sodium]; Talwin [pentazocine]; Ultram [tramadol hcl]; Zetia [ezetimibe]; Zocor [simvastatin]; Other; and Tape    Past Medical History:   Diagnosis Date   • Carotid artery disease (CMS/HCC)    • Carpal tunnel syndrome    • COPD (chronic obstructive pulmonary disease) (CMS/HCC)     asthma, chronic bronchitis   • Diabetes (CMS/HCC)    • GERD (gastroesophageal reflux disease)      s/p surgery for hiatal hernia   • History of cholecystectomy    • HTN (hypertension)    • Hypercholesterolemia    • Palpitations    • Previous back surgery     x5   • S/P GA (total abdominal hysterectomy)    • Sleep apnea     wears CPAP   • TIA (transient ischemic attack)        Social History     Social History   • Marital status:      Spouse name: N/A   • Number of children: N/A   • Years of education: N/A     Occupational  "History   • Not on file.     Social History Main Topics   • Smoking status: Former Smoker   • Smokeless tobacco: Never Used   • Alcohol use Yes      Comment: ssocially, drinks one drink, screwdriver per day.    • Drug use: No   • Sexual activity: Not on file     Other Topics Concern   • Not on file     Social History Narrative   • No narrative on file       Family History   Problem Relation Age of Onset   • Heart attack Mother    • Pancreatic cancer Father    • Other Brother         GABG   • Hypertension Other        Review of Systems   Constitution: Positive for malaise/fatigue. Negative for diaphoresis, fever and weakness.   HENT: Negative for congestion, hoarse voice and nosebleeds.    Eyes: Negative for pain and redness.   Cardiovascular: Negative for chest pain, leg swelling and near-syncope.   Respiratory: Positive for cough, shortness of breath and sleep disturbances due to breathing (uses CPAP). Negative for sputum production.    Endocrine: Negative for cold intolerance and heat intolerance.   Hematologic/Lymphatic: Negative for bleeding problem. Bruises/bleeds easily.   Skin: Negative for dry skin and itching.   Musculoskeletal: Positive for joint pain, myalgias and stiffness. Negative for falls.   Gastrointestinal: Negative for abdominal pain, heartburn and melena.   Genitourinary: Negative for dysuria and hematuria.   Neurological: Positive for dizziness. Negative for headaches.        Objective     /54   Pulse 72   Ht 162.6 cm (64\")   Wt 102 kg (224 lb)   BMI 38.45 kg/m²     Physical Exam   Constitutional: She is oriented to person, place, and time. Vital signs are normal. She appears well-nourished. No distress.   HENT:   Head: Normocephalic.   Eyes: Pupils are equal, round, and reactive to light. Conjunctivae are normal.   Neck: Normal range of motion. Neck supple. No JVD present. Carotid bruit is present.   Cardiovascular: Normal rate, regular rhythm and S1 normal.    Murmur heard.   " Systolic murmur is present with a grade of 2/6   Pulses:       Radial pulses are 2+ on the right side, and 2+ on the left side.   Loud S2   Pulmonary/Chest: Effort normal. She has wheezes. She has no rales.   Abdominal: Soft. Bowel sounds are normal. There is no tenderness.   Musculoskeletal: Normal range of motion. She exhibits no edema.   Neurological: She is alert and oriented to person, place, and time.   Skin: Skin is warm and dry. No pallor.   Psychiatric: She has a normal mood and affect. Her behavior is normal.        Procedures: none today        Assessment/Plan      Bibiana was seen today for hospital follow up, abnormal lab, med refill, cardiac clearance, dizziness and shortness of breath.    Diagnoses and all orders for this visit:    Essential hypertension    Hypercholesteremia    Bilateral carotid artery stenosis    PVD (peripheral vascular disease) (CMS/[facility])    Sleep apnea in adult    Hypertensive heart disease without heart failure    Other orders  -     azilsartan medoxomil (EDARBI) 80 MG tablet tablet; Take 1 tablet by mouth Daily.    Systemic blood pressure well controlled at this time. If she continues to do well with edarbi and chlorthalidone, consider changing to edarbyclor.     The report of recent cardiac cath reviewed. Her shortness of breath has improved. No further cardiac test advised at this time. She asked about cardiac clearance for shoulder surgery. I advised her to have surgeon office to send a request for cardiac clearance which we can then fax recommendation.     Patient's Body mass index is 38.45 kg/m². BMI is above normal parameters. Recommendations include: nutrition counseling. Diet for weight loss encouraged.     For pulmonary management she follows with Dr. Fisher. She manages sleep apnea with CPAP.     For management of carotid artery disease and PVD, she follows specialist in [place].     At next visit consider repeat echo to assess PA pressure. We will plan to see her  back at [facility] in 6 months. Please call sooner for any cardiac concerns.            Electronically signed by RENETTA Hawk,  October 2, 2018 3:57 PM

## 2018-10-02 ENCOUNTER — OFFICE VISIT (OUTPATIENT)
Dept: CARDIOLOGY | Facility: CLINIC | Age: 73
End: 2018-10-02

## 2018-10-02 VITALS
HEART RATE: 72 BPM | DIASTOLIC BLOOD PRESSURE: 54 MMHG | WEIGHT: 224 LBS | BODY MASS INDEX: 38.24 KG/M2 | HEIGHT: 64 IN | SYSTOLIC BLOOD PRESSURE: 116 MMHG

## 2018-10-02 DIAGNOSIS — I65.23 BILATERAL CAROTID ARTERY STENOSIS: ICD-10-CM

## 2018-10-02 DIAGNOSIS — I11.9 HYPERTENSIVE HEART DISEASE WITHOUT HEART FAILURE: ICD-10-CM

## 2018-10-02 DIAGNOSIS — G47.30 SLEEP APNEA IN ADULT: ICD-10-CM

## 2018-10-02 DIAGNOSIS — I73.9 PVD (PERIPHERAL VASCULAR DISEASE) (HCC): ICD-10-CM

## 2018-10-02 DIAGNOSIS — E78.00 HYPERCHOLESTEREMIA: ICD-10-CM

## 2018-10-02 DIAGNOSIS — I10 ESSENTIAL HYPERTENSION: Primary | ICD-10-CM

## 2018-10-02 PROCEDURE — 99213 OFFICE O/P EST LOW 20 MIN: CPT | Performed by: NURSE PRACTITIONER

## 2018-10-02 RX ORDER — CHLORTHALIDONE 25 MG/1
12.5 TABLET ORAL DAILY
COMMUNITY
End: 2019-09-11 | Stop reason: ALTCHOICE

## 2018-10-02 NOTE — PATIENT INSTRUCTIONS
Mitral Valve Regurgitation  Mitral valve regurgitation, also called mitral regurgitation, is a condition in which blood leaks from the mitral valve in the heart. The mitral valve is located between the upper left chamber (left atrium) and the lower left chamber (left ventricle) of the heart. Normally, this valve opens when the atrium pumps blood into the ventricle, and it closes when the ventricle pumps blood out to the body.  Mitral valve regurgitation happens when the mitral valve does not close properly. As a result, blood in the ventricle leaks back into the atrium. Mitral valve regurgitation causes the heart to work harder to pump blood. If the condition is mild, a person may not have symptoms. However, over time, this can lead to heart failure.  What are the causes?  This condition may be caused by:  · A condition in which the mitral valves do not close completely when the heart pumps blood (mitral valve prolapse).  · Infection, such as endocarditis or rheumatic fever.  · Damage to the mitral valve, such as from injury (trauma) to the heart, a problem present at birth (birth defect), or a heart attack.  · Certain medicines.    What increases the risk?  This condition is more likely to develop in people who have:  · Certain forms of heart disease.  · A family history of heart valve disease.  · Certain conditions that are present at birth (congenital).    You are also more likely to develop this condition if you have taken certain diet pills in the past.  What are the signs or symptoms?  Symptoms of this condition include:  · Shortness of breath with physical activity, like climbing stairs.  · Fast or irregular heartbeat.  · Cough.  · Suddenly waking up at night with difficulty breathing or needing to urinate.  · Heavy breathing.  · Extreme tiredness.  · Swelling in the lower legs, ankles, and feet.    In some cases of mild to moderate mitral regurgitation, there are no symptoms.  How is this diagnosed?  This  condition may be diagnosed based on the results of a physical exam. Your health care provider will listen to your heart for an abnormal heart sound (murmur). You may also have other tests, including:  · An echocardiogram. This test creates ultrasound images of the heart that allow your health care provider to see how the heart valves work while your heart is beating.  · Chest X-ray.  · Electrocardiogram (ECG). This is a test that records the electrical impulses of the heart.  · Cardiac catheterization. This test is used to look at the structure and function of the heart. A thin tube (catheter) is passed through the blood vessels and into the heart. Dye is injected into the blood vessels so the cardiac system can be seen on images that are taken.    How is this treated?  This condition may be treated with:  · Medicines. These may be given to treat symptoms and prevent complications.  · Surgery to repair or replace the mitral valve in severe, long-term (chronic) cases.    Follow these instructions at home:  Lifestyle  · Limit alcohol intake to no more than 1 drink a day for nonpregnant women and 2 drinks a day for men. One drink equals 12 oz of beer, 5 oz of wine, or 1½ oz of hard liquor.  · Do not use any products that contain nicotine or tobacco, such as cigarettes and e-cigarettes. If you need help quitting, ask your health care provider.  · Eat a heart-healthy diet that includes plenty of fresh fruits and vegetables, whole grains, low-fat (lean) protein, and low-fat dairy products. Consider working with a diet and nutrition specialist (dietitian) to help you make healthy food choices.  · Limit the amount of salt (sodium) in your diet. Avoid adding salt to foods, and avoid foods that are high in salt, such as:  ? Pickles.  ? Smoked and cured meats.  ? Processed foods.  · Maintain a healthy weight and stay physically active. Ask your health care provider to recommend activities that are safe for you.  · Try to get 7  or more hours of sleep each night.  · Find ways to manage stress. If you need help with this, ask your health care provider.  General instructions    · Take over-the-counter and prescription medicines only as told by your health care provider.  · Work closely with your health care provider to manage any other health conditions you have, such as diabetes or high blood pressure.  · If you plan to become pregnant, talk with your health care provider first.  · Keep all follow-up visits as told by your health care provider. This is important.  Contact a health care provider if:  · You have a fever.  · You feel more tired than usual when doing physical activity.  · You have a dry cough.  Get help right away if:  · You have shortness of breath.  · You develop chest pain.  · You have swelling in your hands, feet, ankles, or abdomen that is getting worse.  · You have trouble staying awake or you faint.  · You feel dizzy or unsteady.  · You suddenly gain weight.  · You feel confused.  · Any of your symptoms begin to get worse.  These symptoms may represent a serious problem that is an emergency. Do not wait to see if the symptoms will go away. Get medical help right away. Call your local emergency services (911 in the [place] Do not drive yourself to the hospital.  Summary  · Mitral valve regurgitation, also called mitral regurgitation, is a condition in which blood leaks from a valve between two chambers of the heart (mitral valve).  · Depending on how severe your condition is, you may be treated with medicines or surgery.  · Practice heart-healthy habits to manage this condition. These include limiting alcohol, avoiding nicotine and tobacco, and eating a balanced diet that is low in salt (sodium).  This information is not intended to replace advice given to you by your health care provider. Make sure you discuss any questions you have with your health care provider.  Document Released: 03/07/2006 Document Revised: 09/29/2017  Document Reviewed: 09/29/2017  [employer] Interactive Patient Education © 2018 [employer]

## 2018-11-09 ENCOUNTER — TELEPHONE (OUTPATIENT)
Dept: CARDIOLOGY | Facility: CLINIC | Age: 73
End: 2018-11-09

## 2018-11-09 NOTE — TELEPHONE ENCOUNTER
Received fax from Dr. Bill Stapleton for cardiac clearance for patient to have a left shoulder reverse total. Patient is on aspirin. According to our records, I do not see where patient has had any stenting.       Fax [phone]  Phone [phone]

## 2019-03-08 NOTE — PROGRESS NOTES
Chief Complaint   Patient presents with   • Follow-up     For cardiac management. Patient is on aspirin. Reports that she has had some chest pain, but relates to lungs. Last lab work was done on 01/10/19 per Dr. Dangelo, in chart under media.    • Med Refill     Needs refills on Edarbi to [facility] 90 day supplies.        Cardiac Complaints  none      Subjective   Bibiana Quezada is a 73 y.o. female with HTN, hypothyroidism, hyperlipidemia, COPD, and carotid artery disease s/p (R)CEA in January 2014. She also has a history of numerous back surgeries. She is followed by Dr. Fisher secondary to asthma/bronchitis after inhaling noxious chemicals many years ago. Sleep apnea managed with CPAP. Her blood pressure has been labile and medication changes have been made. In October 2016, repeat cardiac workup showed apical ischemia with echocardiogram with EF 65%, LVH and PA pressure 37 mmHg. Cardiac cath showed normal coronaries and hypertensive heart disease. She is followed by Dr. Marrero in [place] regarding the carotid and peripheral disease.  CTA showed around 50% stenosis at the bifurcation. Carotid US stable per patient. Dr. Dangelo, endocrinologist manages her labs. At her July 2018 office visit, she had more shortness of breath and elevated BP. Losartan stopped and Edarbi started. Increase dose of statin advised. On 8/21/18, she underwent right and left heart cath that showed significant systemic hypertension for which chlorthalidone was added. PA pressure was also high and she was advised to consider pulmonary hypertension management. CTA of chest 8/28/18 done secondary to chest pain and D-dimer was negative for embolism or other significant abnormality. She returns today for follow up and no new concerns are voiced.  She does continue to have shortness of breath but denies any worse than prior and relates to her lungs for which she follows with Dr. Fisher in regards. She does admit to some pain in her left shoulder  from surgery that was performed but states she is slowly doing better in regards. Labs from 1/2/2019 with Dr. Dangelo show BUN 17, creatinine 1.22, K 4.7, Na 136, AST 21, ALT 16, HDL 32, LDL 71, TRIG 269, TSH 0.03, and AIC 7.9%. Refills of edarbi requested for 90 day supply.  She does have concerns over the chlorthalidone therapy and if she needs to take as her blood pressure has been too low.        Cardiac History  Past Surgical History:   Procedure Laterality Date   • CARDIAC CATHETERIZATION  10/24/2016    Normal Coronaries. Hypertensive Heart Disease.   • CARDIAC CATHETERIZATION  08/21/2018    Normal Coronaries. EF 65%. PA- 60/28(44). LVEDP- 14. PCW 29.   • CARDIOVASCULAR STRESS TEST  10/12/2016    L. Myoview- Apical Ischemia   • CARDIOVASCULAR STRESS TEST  05/14/2013    L. Myoview- R/O Apical Ischemia.   • CARDIOVASCULAR STRESS TEST  12/14/2011     L. Myoview- (Fl) Negative    • CARDIOVASCULAR STRESS TEST  10/12/2016    Lexiscan- small apical ischemia, increase Imdur, cath   • CAROTID ENDARTERECTOMY  01/16/2014    (R) Endarterectomy by Dr. Clayton   • ECHO - CONVERTED  08/07/2012    EF 65%   • ECHO - CONVERTED  10/12/2016    EF 65%, mild MR, borderline PHT   • ECHO - CONVERTED  03/13/2018    @[facility]. EF 65%. RVSP_ 55 mmHg   • US CAROTID UNILATERAL  12/03/2013    @[facility]- Critical Stenosis  (R) ICA.       Current Outpatient Medications   Medication Sig Dispense Refill   • albuterol (PROVENTIL HFA;VENTOLIN HFA) 108 (90 BASE) MCG/ACT inhaler Inhale 2 puffs Every 4 (Four) Hours As Needed for wheezing.     • aspirin 81 MG EC tablet Take 81 mg by mouth Daily.     • atorvastatin (LIPITOR) 10 MG tablet Take 5 mg by mouth Daily.     • azilsartan medoxomil (EDARBI) 80 MG tablet tablet Take 1 tablet by mouth Daily. 90 tablet 3   • chlorthalidone (HYGROTON) 25 MG tablet Take 12.5 mg by mouth Daily.     • Cholecalciferol (VITAMIN D3) 5000 units capsule capsule Take 5,000 Units by mouth 1 (One) Time Per Week.     • diltiazem CD  (CARDIZEM CD) 360 MG 24 hr capsule TAKE ONE CAPSULE BY MOUTH DAILY 90 capsule 2   • DULoxetine (CYMBALTA) 60 MG capsule Take 60 mg by mouth Daily.     • esomeprazole (nexIUM) 40 MG capsule Take 40 mg by mouth 2 (Two) Times a Day.     • Fluticasone Furoate-Vilanterol (BREO ELLIPTA IN) Inhale Daily.     • furosemide (LASIX) 40 MG tablet Take 40 mg by mouth Daily.     • gabapentin (NEURONTIN) 300 MG capsule 1 capsule in am and 2 capsules at night     • Insulin Lispro (HUMALOG SC) Inject  under the skin.     • levothyroxine (SYNTHROID, LEVOTHROID) 137 MCG tablet Take 137 mcg by mouth Daily.     • Multiple Vitamin (MULTI VITAMIN DAILY PO) Take  by mouth.     • nebivolol (BYSTOLIC) 5 MG tablet Take 1 tablet by mouth Daily. 90 tablet 3   • pioglitazone (ACTOS) 30 MG tablet Take 30 mg by mouth Daily.     • primidone (MYSOLINE) 50 MG tablet Take 50 mg by mouth Every Night.       No current facility-administered medications for this visit.        Augmentin [amoxicillin-pot clavulanate]; Codeine; Demerol [meperidine]; Hydrocodone; Lescol [fluvastatin sodium]; Livalo [pitavastatin]; Morphine and related; Percocet [oxycodone-acetaminophen]; Percodan [oxycodone-aspirin]; Pravachol [pravastatin sodium]; Talwin [pentazocine]; Ultram [tramadol hcl]; Zetia [ezetimibe]; Zocor [simvastatin]; Other; and Tape    Past Medical History:   Diagnosis Date   • Carotid artery disease (CMS/HCC)    • Carpal tunnel syndrome    • COPD (chronic obstructive pulmonary disease) (CMS/HCC)     asthma, chronic bronchitis   • Diabetes (CMS/HCC)    • GERD (gastroesophageal reflux disease)      s/p surgery for hiatal hernia   • History of cholecystectomy    • HTN (hypertension)    • Hypercholesterolemia    • Palpitations    • Previous back surgery     x5   • S/P GA (total abdominal hysterectomy)    • Sleep apnea     wears CPAP   • TIA (transient ischemic attack)        Social History     Socioeconomic History   • Marital status:      Spouse name: Not  "on file   • Number of children: Not on file   • Years of education: Not on file   • Highest education level: Not on file   Social Needs   • Financial resource strain: Not on file   • Food insecurity - worry: Not on file   • Food insecurity - inability: Not on file   • Transportation needs - medical: Not on file   • Transportation needs - non-medical: Not on file   Occupational History   • Not on file   Tobacco Use   • Smoking status: Former Smoker   • Smokeless tobacco: Never Used   Substance and Sexual Activity   • Alcohol use: Yes     Comment: ssocially, drinks one drink, screwdriver per day.    • Drug use: No   • Sexual activity: Not on file   Other Topics Concern   • Not on file   Social History Narrative   • Not on file       Family History   Problem Relation Age of Onset   • Heart attack Mother    • Pancreatic cancer Father    • Other Brother         GABG   • Hypertension Other        Review of Systems   Constitution: Negative for weakness and malaise/fatigue.   Cardiovascular: Positive for dyspnea on exertion. Negative for chest pain, claudication, irregular heartbeat, leg swelling, near-syncope, palpitations and syncope.   Respiratory: Positive for shortness of breath and wheezing. Negative for cough.    Musculoskeletal: Positive for joint pain and stiffness. Negative for joint swelling.   Gastrointestinal: Negative for anorexia, heartburn, nausea and vomiting.   Genitourinary: Negative for dysuria, hematuria, hesitancy and nocturia.   Neurological: Negative for dizziness, light-headedness and loss of balance.   Psychiatric/Behavioral: Negative for depression and memory loss. The patient is not nervous/anxious.            Objective     /42 (BP Location: Right arm)   Pulse 64   Ht 162.6 cm (64.02\")   Wt 102 kg (225 lb)   BMI 38.60 kg/m²     Physical Exam   Constitutional: She is oriented to person, place, and time. She appears well-developed and well-nourished.   HENT:   Head: Normocephalic and " atraumatic.   Eyes: EOM are normal. Pupils are equal, round, and reactive to light.   Neck: Normal range of motion. Neck supple. Carotid bruit is present.   Cardiovascular: Normal rate and regular rhythm.   Murmur heard.  Pulmonary/Chest: Effort normal. She has wheezes.   Abdominal: Soft.   Musculoskeletal: Normal range of motion.   Neurological: She is alert and oriented to person, place, and time.   Skin: Skin is warm and dry.   Psychiatric: She has a normal mood and affect. Her behavior is normal.       Procedures    Assessment/Plan     HR is stable today and regular.  Current cardizem dosing continued for palpitations as they are denied today.  HTN is well managed but BP low side of normal DBP. She will hold her chlorthalidone for now and use as needed for SBP above 140.  No other changes for HTN advised.  Refills of edarbi sent per request.  Recent labs reviewed showed triglycerides high at over 200 but LDL at goal.  It should be noted her AIC high as well at 7.9%.  She does admit to limiting sugar/carbs and was advised to continue.  Current lipitor dosing continued.  AIC to be rechecked with Dr. Dangelo again soon as he manages DM.  Carotid bruit auscultated over left carotid, she states Dr. Marrero is monitoring carotid stenosis and PVD.  BMI elevated at 38.60, good cardiac ADA diet with limited caloric intake, carbs, and walking regimen recommended.        Problems Addressed this Visit        Cardiovascular and Mediastinum    Hypertensive heart disease without heart failure    Essential hypertension - Primary    Relevant Medications    azilsartan medoxomil (EDARBI) 80 MG tablet tablet    Hypercholesteremia    Bilateral carotid artery disease (CMS/HCC)    PVD (peripheral vascular disease) (CMS/[facility])       Respiratory    Sleep apnea in adult    COPD (chronic obstructive pulmonary disease) (CMS/[facility])       Endocrine    Controlled type 2 diabetes mellitus without complication, with long-term current use of  insulin (CMS/[facility])      Other Visit Diagnoses     Shortness of breath        Obesity (BMI 30-39.9)              Patient's Body mass index is 38.6 kg/m². BMI is above normal parameters. Recommendations include: nutrition counseling.              Electronically signed by RENETTA Greene March 14, 2019 3:53 PM

## 2019-03-13 ENCOUNTER — OFFICE VISIT (OUTPATIENT)
Dept: CARDIOLOGY | Facility: CLINIC | Age: 74
End: 2019-03-13

## 2019-03-13 VITALS
BODY MASS INDEX: 38.41 KG/M2 | DIASTOLIC BLOOD PRESSURE: 42 MMHG | HEIGHT: 64 IN | HEART RATE: 64 BPM | SYSTOLIC BLOOD PRESSURE: 138 MMHG | WEIGHT: 225 LBS

## 2019-03-13 DIAGNOSIS — E78.00 HYPERCHOLESTEREMIA: ICD-10-CM

## 2019-03-13 DIAGNOSIS — E66.9 OBESITY (BMI 30-39.9): ICD-10-CM

## 2019-03-13 DIAGNOSIS — I11.9 HYPERTENSIVE HEART DISEASE WITHOUT HEART FAILURE: ICD-10-CM

## 2019-03-13 DIAGNOSIS — I65.23 BILATERAL CAROTID ARTERY STENOSIS: ICD-10-CM

## 2019-03-13 DIAGNOSIS — J44.9 CHRONIC OBSTRUCTIVE PULMONARY DISEASE, UNSPECIFIED COPD TYPE (HCC): ICD-10-CM

## 2019-03-13 DIAGNOSIS — I10 ESSENTIAL HYPERTENSION: Primary | ICD-10-CM

## 2019-03-13 DIAGNOSIS — Z79.4 CONTROLLED TYPE 2 DIABETES MELLITUS WITHOUT COMPLICATION, WITH LONG-TERM CURRENT USE OF INSULIN (HCC): ICD-10-CM

## 2019-03-13 DIAGNOSIS — R06.02 SHORTNESS OF BREATH: ICD-10-CM

## 2019-03-13 DIAGNOSIS — G47.30 SLEEP APNEA IN ADULT: ICD-10-CM

## 2019-03-13 DIAGNOSIS — I73.9 PVD (PERIPHERAL VASCULAR DISEASE) (HCC): ICD-10-CM

## 2019-03-13 DIAGNOSIS — E11.9 CONTROLLED TYPE 2 DIABETES MELLITUS WITHOUT COMPLICATION, WITH LONG-TERM CURRENT USE OF INSULIN (HCC): ICD-10-CM

## 2019-03-13 PROCEDURE — 99214 OFFICE O/P EST MOD 30 MIN: CPT | Performed by: NURSE PRACTITIONER

## 2019-08-01 RX ORDER — NEBIVOLOL HYDROCHLORIDE 5 MG/1
TABLET ORAL
Qty: 90 TABLET | Refills: 0 | Status: SHIPPED | OUTPATIENT
Start: 2019-08-01 | End: 2019-09-11 | Stop reason: SDUPTHER

## 2019-09-11 ENCOUNTER — OFFICE VISIT (OUTPATIENT)
Dept: CARDIOLOGY | Facility: CLINIC | Age: 74
End: 2019-09-11

## 2019-09-11 VITALS
WEIGHT: 216 LBS | SYSTOLIC BLOOD PRESSURE: 112 MMHG | DIASTOLIC BLOOD PRESSURE: 70 MMHG | HEART RATE: 68 BPM | BODY MASS INDEX: 36.88 KG/M2 | HEIGHT: 64 IN

## 2019-09-11 DIAGNOSIS — E78.00 HYPERCHOLESTEREMIA: ICD-10-CM

## 2019-09-11 DIAGNOSIS — G47.30 SLEEP APNEA IN ADULT: ICD-10-CM

## 2019-09-11 DIAGNOSIS — J44.9 CHRONIC OBSTRUCTIVE PULMONARY DISEASE, UNSPECIFIED COPD TYPE (HCC): ICD-10-CM

## 2019-09-11 DIAGNOSIS — I11.9 HYPERTENSIVE HEART DISEASE WITHOUT HEART FAILURE: ICD-10-CM

## 2019-09-11 DIAGNOSIS — I10 ESSENTIAL HYPERTENSION: ICD-10-CM

## 2019-09-11 DIAGNOSIS — I65.23 BILATERAL CAROTID ARTERY STENOSIS: Primary | ICD-10-CM

## 2019-09-11 PROCEDURE — 99214 OFFICE O/P EST MOD 30 MIN: CPT | Performed by: NURSE PRACTITIONER

## 2019-09-11 RX ORDER — NEBIVOLOL 5 MG/1
5 TABLET ORAL DAILY
Qty: 90 TABLET | Refills: 3 | Status: SHIPPED | OUTPATIENT
Start: 2019-09-11 | End: 2020-05-13

## 2019-09-11 RX ORDER — GABAPENTIN 400 MG/1
CAPSULE ORAL
COMMUNITY

## 2019-09-11 RX ORDER — MONTELUKAST SODIUM 10 MG/1
10 TABLET ORAL NIGHTLY
COMMUNITY

## 2019-09-11 NOTE — PROGRESS NOTES
Chief Complaint   Patient presents with   • Follow-up     Cardiac management. She has had to Iron infusions, last a week ago. She had fall with fx to left arm in June, had surgery.   • Lab     Last labs at  when she had surgery on left arm.   • Shortness of Breath     Is some better since having iron infusions.   • Palpitations     Same as before.   • Chest Pain     Has had some pressure to mid chest and sharp pain that is very short in duration, she has not been alarmed with the pain.    • Blood pressure     Has been stable at home.   • Med Refill     Needs refills on Bystolic-90 day.       Subjective       Bibiana Quezada is a 74 y.o. female with HTN, hypothyroidism, hyperlipidemia, COPD, and carotid artery disease s/p (R)CEA in January 2014. She also has a history of numerous back surgeries. She is followed by Dr. Fisher secondary to asthma/bronchitis after inhaling noxious chemicals many years ago. Sleep apnea managed with CPAP. Her blood pressure has been labile and medication changes have been made. In October 2016, repeat cardiac workup showed apical ischemia with echocardiogram with EF 65%, LVH and PA pressure 37 mmHg. Cardiac cath showed normal coronaries and hypertensive heart disease. She is followed by Dr. Marrero in [place] regarding the carotid and peripheral disease.  CTA showed around 50% stenosis at the bifurcation. Carotid US stable per patient. Dr. Dangelo, endocrinologist manages her labs. At her July 2018 office visit, she had more shortness of breath and elevated BP. Losartan stopped and Edarbi started. Increase dose of statin advised. On 8/21/18, she underwent R&L heart cath that showed significant systemic HTN for which chlorthalidone was added. PA pressure was also high and she was advised to consider PHTN management. CTA of chest 8/28/18 done secondary to chest pain and elevated D-dimer was negative for embolism or other significant abnormality.     She came today for follow up. She  recently had a fall, fractured left humerus, underwent surgery in June 2019, continues to have pain and swelling. undergoing PT for arm and left hand which is numb. She was noted to be anemic prior to fall with Hgb 7.1 according to her. Our last CBC showed Hgb 10.6 in August 2018. Unable to find cause.  She is now receiving iron infusions with [facility] in [place]. SOB improving with iron treatments. She plans to see Dr. Olvera again next week. She has mild midsternal chest pressure, not exertional. Palpitations occur a couple of times per week and last a few seconds.     HPI         Cardiac History:    Past Surgical History:   Procedure Laterality Date   • CARDIAC CATHETERIZATION  10/24/2016    Normal Coronaries. Hypertensive Heart Disease.   • CARDIAC CATHETERIZATION  08/21/2018    Normal Coronaries. EF 65%. PA- 60/28(44). LVEDP- 14. PCW 29.   • CARDIOVASCULAR STRESS TEST  10/12/2016    L. Myoview- Apical Ischemia   • CARDIOVASCULAR STRESS TEST  05/14/2013    L. Myoview- R/O Apical Ischemia.   • CARDIOVASCULAR STRESS TEST  12/14/2011     L. Myoview- (Fl) Negative    • CARDIOVASCULAR STRESS TEST  10/12/2016    Lexiscan- small apical ischemia, increase Imdur, cath   • CAROTID ENDARTERECTOMY  01/16/2014    (R) Endarterectomy by Dr. Clayton   • ECHO - CONVERTED  08/07/2012    EF 65%   • ECHO - CONVERTED  10/12/2016    EF 65%, mild MR, borderline PHT   • ECHO - CONVERTED  03/13/2018    @[facility]. EF 65%. RVSP_ 55 mmHg   • US CAROTID UNILATERAL  12/03/2013    @[facility]- Critical Stenosis  (R) ICA.       Current Outpatient Medications   Medication Sig Dispense Refill   • albuterol (PROVENTIL HFA;VENTOLIN HFA) 108 (90 BASE) MCG/ACT inhaler Inhale 2 puffs Every 4 (Four) Hours As Needed for wheezing.     • aspirin 81 MG EC tablet Take 81 mg by mouth Daily.     • atorvastatin (LIPITOR) 10 MG tablet Take 10 mg by mouth Daily.     • azilsartan medoxomil (EDARBI) 80 MG tablet tablet Take 1 tablet by mouth Daily. 90 tablet  3   • B Complex Vitamins (VITAMIN-B COMPLEX PO) Take  by mouth Daily.     • CALCIUM PO Take  by mouth Daily.     • Cholecalciferol (VITAMIN D3) 5000 units capsule capsule Take 5,000 Units by mouth 1 (One) Time Per Week.     • diltiazem CD (CARDIZEM CD) 360 MG 24 hr capsule TAKE ONE CAPSULE BY MOUTH DAILY 90 capsule 2   • DULoxetine (CYMBALTA) 60 MG capsule Take 60 mg by mouth Daily.     • esomeprazole (nexIUM) 40 MG capsule Take 40 mg by mouth 2 (Two) Times a Day.     • Fluticasone Furoate-Vilanterol (BREO ELLIPTA IN) Inhale Daily.     • furosemide (LASIX) 40 MG tablet Take 40 mg by mouth Daily.     • gabapentin (NEURONTIN) 400 MG capsule Take 400 mg by mouth 4 (Four) Times a Day.     • Insulin Lispro (HUMALOG SC) Inject  under the skin.     • levothyroxine (SYNTHROID, LEVOTHROID) 137 MCG tablet Take 137 mcg by mouth Daily.     • montelukast (SINGULAIR) 10 MG tablet Take 10 mg by mouth Every Night.     • Multiple Vitamin (MULTI VITAMIN DAILY PO) Take  by mouth.     • nebivolol (BYSTOLIC) 5 MG tablet Take 1 tablet by mouth Daily. 90 tablet 3   • pioglitazone (ACTOS) 30 MG tablet Take 30 mg by mouth Daily.     • PRIMIDONE PO Take 5 mg by mouth Every Night.     • Tiotropium Bromide Monohydrate (SPIRIVA RESPIMAT) 1.25 MCG/ACT aerosol solution inhaler Inhale 1 puff Daily.       No current facility-administered medications for this visit.        Augmentin [amoxicillin-pot clavulanate]; Codeine; Demerol [meperidine]; Hydrocodone; Lescol [fluvastatin sodium]; Livalo [pitavastatin]; Morphine and related; Percocet [oxycodone-acetaminophen]; Percodan [oxycodone-aspirin]; Pravachol [pravastatin sodium]; Talwin [pentazocine]; Ultram [tramadol hcl]; Zetia [ezetimibe]; Zocor [simvastatin]; Other; and Tape    Past Medical History:   Diagnosis Date   • Anemia    • Carotid artery disease (CMS/HCC)    • Carpal tunnel syndrome    • Closed left arm fracture 06/2019   • COPD (chronic obstructive pulmonary disease) (CMS/HCC)     asthma,  "chronic bronchitis   • Diabetes (CMS/HCC)    • GERD (gastroesophageal reflux disease)      s/p surgery for hiatal hernia   • History of cholecystectomy    • HTN (hypertension)    • Hypercholesterolemia    • Palpitations    • Previous back surgery     x5   • S/P GA (total abdominal hysterectomy)    • Sleep apnea     wears CPAP   • TIA (transient ischemic attack)        Social History     Socioeconomic History   • Marital status:      Spouse name: Not on file   • Number of children: Not on file   • Years of education: Not on file   • Highest education level: Not on file   Tobacco Use   • Smoking status: Former Smoker   • Smokeless tobacco: Never Used   Substance and Sexual Activity   • Alcohol use: Yes     Comment: ssocially, drinks one drink, screwdriver per day.    • Drug use: No       Family History   Problem Relation Age of Onset   • Heart attack Mother    • Pancreatic cancer Father    • Other Brother         GABG   • Hypertension Other        Review of Systems   Constitution: Positive for weakness, malaise/fatigue and weight loss (decreased 9 lb ). Negative for decreased appetite.   HENT: Negative.    Eyes: Negative.    Cardiovascular: Positive for dyspnea on exertion and palpitations. Negative for chest pain, leg swelling, orthopnea and syncope.   Respiratory: Positive for shortness of breath. Negative for cough.    Endocrine: Negative.    Hematologic/Lymphatic: Negative.    Skin: Negative.    Musculoskeletal: Positive for arthritis, falls, joint pain and stiffness. Negative for myalgias.   Gastrointestinal: Negative for abdominal pain and melena.   Genitourinary: Negative for hematuria.   Neurological: Negative for dizziness.   Psychiatric/Behavioral: Negative for altered mental status and depression.   Allergic/Immunologic: Negative.       Diabetes- Yes  Thyroid-normal    Objective     /70 (BP Location: Right arm)   Pulse 68   Ht 162.6 cm (64.02\")   Wt 98 kg (216 lb)   BMI 37.06 kg/m² "     Physical Exam   Constitutional: She is oriented to person, place, and time. She appears well-developed and well-nourished. No distress.   HENT:   Head: Normocephalic and atraumatic.   Eyes: Pupils are equal, round, and reactive to light.   Neck: Normal range of motion.   Cardiovascular: Normal rate, regular rhythm and intact distal pulses.   Murmur heard.  Pulmonary/Chest: Effort normal and breath sounds normal. No respiratory distress. She has no wheezes. She has no rales.   Abdominal: Soft. Bowel sounds are normal.   Musculoskeletal: She exhibits tenderness (left arm mildly swollen, good radial pulse). She exhibits no edema.   Neurological: She is alert and oriented to person, place, and time.   Skin: Skin is warm and dry. She is not diaphoretic.   Psychiatric: She has a normal mood and affect.   Nursing note and vitals reviewed.     Procedures          Assessment/Plan      Bibiana was seen today for follow-up, lab, shortness of breath, palpitations, chest pain, blood pressure and med refill.    Diagnoses and all orders for this visit:    Bilateral carotid artery stenosis    Essential hypertension    Hypercholesteremia    Hypertensive heart disease without heart failure    Chronic obstructive pulmonary disease, unspecified COPD type (CMS/[facility])    Sleep apnea in adult    Other orders  -     nebivolol (BYSTOLIC) 5 MG tablet; Take 1 tablet by mouth Daily.    1. Carotid artery disease- s/p (R)CEA, 2014. Patient has requested we follow annual carotid US which will be ordered. She has no TIA symptoms.     2. HTN- well controlled with current regimen of Edarbi, diltiazem, Lasix, and Bystolic. Chlorthalidone has been discontinued. Continue low sodium diet.     3. Hypercholesterolemia- continue Lipitor. 4/2019 LDL well controlled at 68, Tri elevated at 214. Continue low saturated fat, low sugar diet.     4. COPD/sleep apnea- stable. Followed by Dr. Fisher.     5. PHTN- PA pressure 55 mmHg, repeat echocardiogram.     6.  Diabetes- followed by Dr. Antolin wolff, A1C 8.0% 4/2019. Continue aspirin, statin, ARB.     Clinically, she appears stable from a cardiac standpoint. No medication changes made. BP is stable. SOB improved. Will repeat echocardiogram to reassess PA pressure. Carotid US will be ordered to evaluate stenosis. Further recommendations to follow. Refills sent for Bystolic.     Patient's Body mass index is 37.06 kg/m². BMI is above normal parameters. Recommendations include: nutrition counseling.               Electronically signed by RENETTA Eng,  September 11, 2019 11:37 AM

## 2019-09-15 PROBLEM — I27.20 PULMONARY HTN (HCC): Status: ACTIVE | Noted: 2019-09-15

## 2019-09-24 ENCOUNTER — HOSPITAL ENCOUNTER (OUTPATIENT)
Dept: CARDIOLOGY | Facility: HOSPITAL | Age: 74
Discharge: HOME | End: 2019-09-24
Admitting: NURSE PRACTITIONER

## 2019-09-24 ENCOUNTER — HOSPITAL ENCOUNTER (OUTPATIENT)
Dept: CARDIOLOGY | Facility: HOSPITAL | Age: 74
Discharge: HOME | End: 2019-09-24

## 2019-09-24 DIAGNOSIS — J44.9 CHRONIC OBSTRUCTIVE PULMONARY DISEASE, UNSPECIFIED COPD TYPE (HCC): ICD-10-CM

## 2019-09-24 DIAGNOSIS — I10 ESSENTIAL HYPERTENSION: ICD-10-CM

## 2019-09-24 DIAGNOSIS — I11.9 HYPERTENSIVE HEART DISEASE WITHOUT HEART FAILURE: ICD-10-CM

## 2019-09-24 DIAGNOSIS — I65.23 BILATERAL CAROTID ARTERY STENOSIS: ICD-10-CM

## 2019-09-24 DIAGNOSIS — G47.30 SLEEP APNEA IN ADULT: ICD-10-CM

## 2019-09-24 LAB
BH CV ECHO MEAS - ACS: 1.5 CM
BH CV ECHO MEAS - AO MAX PG: 24 MMHG
BH CV ECHO MEAS - AO MEAN PG (FULL): 7.9 MMHG
BH CV ECHO MEAS - AO MEAN PG: 15.2 MMHG
BH CV ECHO MEAS - AO ROOT AREA (BSA CORRECTED): 1
BH CV ECHO MEAS - AO ROOT AREA: 3.3 CM^2
BH CV ECHO MEAS - AO ROOT DIAM: 2.1 CM
BH CV ECHO MEAS - AO V2 MAX: 243 CM/SEC
BH CV ECHO MEAS - AO V2 MEAN: 186.6 CM/SEC
BH CV ECHO MEAS - AO V2 VTI: 58.4 CM
BH CV ECHO MEAS - AVA(I,A): 1.7 CM^2
BH CV ECHO MEAS - AVA(I,D): 1.7 CM^2
BH CV ECHO MEAS - BSA(HAYCOCK): 2.1 M^2
BH CV ECHO MEAS - BSA: 2 M^2
BH CV ECHO MEAS - BZI_BMI: 37.1 KILOGRAMS/M^2
BH CV ECHO MEAS - BZI_METRIC_HEIGHT: 162.6 CM
BH CV ECHO MEAS - BZI_METRIC_WEIGHT: 98 KG
BH CV ECHO MEAS - EDV(CUBED): 72.4 ML
BH CV ECHO MEAS - EDV(MOD-SP4): 73 ML
BH CV ECHO MEAS - EDV(TEICH): 77.1 ML
BH CV ECHO MEAS - EF(CUBED): 90.5 %
BH CV ECHO MEAS - EF(MOD-SP4): 42.5 %
BH CV ECHO MEAS - EF(TEICH): 85.5 %
BH CV ECHO MEAS - ESV(CUBED): 6.9 ML
BH CV ECHO MEAS - ESV(MOD-SP4): 42 ML
BH CV ECHO MEAS - ESV(TEICH): 11.2 ML
BH CV ECHO MEAS - FS: 54.4 %
BH CV ECHO MEAS - IVS/LVPW: 1
BH CV ECHO MEAS - IVSD: 1.3 CM
BH CV ECHO MEAS - LA DIMENSION: 3.8 CM
BH CV ECHO MEAS - LA/AO: 1.8
BH CV ECHO MEAS - LV DIASTOLIC VOL/BSA (35-75): 36.1 ML/M^2
BH CV ECHO MEAS - LV IVRT: 0.08 SEC
BH CV ECHO MEAS - LV MASS(C)D: 203.7 GRAMS
BH CV ECHO MEAS - LV MASS(C)DI: 100.8 GRAMS/M^2
BH CV ECHO MEAS - LV MEAN PG: 7.3 MMHG
BH CV ECHO MEAS - LV SYSTOLIC VOL/BSA (12-30): 20.8 ML/M^2
BH CV ECHO MEAS - LV V1 MEAN: 129.3 CM/SEC
BH CV ECHO MEAS - LV V1 VTI: 42 CM
BH CV ECHO MEAS - LVIDD: 4.2 CM
BH CV ECHO MEAS - LVIDS: 1.9 CM
BH CV ECHO MEAS - LVLD AP4: 7.7 CM
BH CV ECHO MEAS - LVLS AP4: 6.6 CM
BH CV ECHO MEAS - LVOT AREA (M): 2.3 CM^2
BH CV ECHO MEAS - LVOT AREA: 2.3 CM^2
BH CV ECHO MEAS - LVOT DIAM: 1.7 CM
BH CV ECHO MEAS - LVPWD: 1.3 CM
BH CV ECHO MEAS - MV A MAX VEL: 119.9 CM/SEC
BH CV ECHO MEAS - MV DEC SLOPE: 410.3 CM/SEC^2
BH CV ECHO MEAS - MV E MAX VEL: 129.8 CM/SEC
BH CV ECHO MEAS - MV E/A: 1.1
BH CV ECHO MEAS - MV MEAN PG: 4.4 MMHG
BH CV ECHO MEAS - MV P1/2T MAX VEL: 152.9 CM/SEC
BH CV ECHO MEAS - MV P1/2T: 109.2 MSEC
BH CV ECHO MEAS - MV V2 MEAN: 98.2 CM/SEC
BH CV ECHO MEAS - MV V2 VTI: 54.3 CM
BH CV ECHO MEAS - MVA P1/2T LCG: 1.4 CM^2
BH CV ECHO MEAS - MVA(P1/2T): 2 CM^2
BH CV ECHO MEAS - MVA(VTI): 1.8 CM^2
BH CV ECHO MEAS - RAP SYSTOLE: 10 MMHG
BH CV ECHO MEAS - RVDD: 3.1 CM
BH CV ECHO MEAS - RVSP: 49.7 MMHG
BH CV ECHO MEAS - SI(AO): 96.2 ML/M^2
BH CV ECHO MEAS - SI(CUBED): 32.4 ML/M^2
BH CV ECHO MEAS - SI(LVOT): 47.9 ML/M^2
BH CV ECHO MEAS - SI(MOD-SP4): 15.3 ML/M^2
BH CV ECHO MEAS - SI(TEICH): 32.6 ML/M^2
BH CV ECHO MEAS - SV(AO): 194.4 ML
BH CV ECHO MEAS - SV(CUBED): 65.5 ML
BH CV ECHO MEAS - SV(LVOT): 96.8 ML
BH CV ECHO MEAS - SV(MOD-SP4): 31 ML
BH CV ECHO MEAS - SV(TEICH): 65.9 ML
BH CV ECHO MEAS - TR MAX VEL: 314.9 CM/SEC
MAXIMAL PREDICTED HEART RATE: 148 BPM
STRESS TARGET HR: 126 BPM

## 2019-09-24 PROCEDURE — 93306 TTE W/DOPPLER COMPLETE: CPT

## 2019-09-24 PROCEDURE — 93880 EXTRACRANIAL BILAT STUDY: CPT

## 2019-09-24 PROCEDURE — 93880 EXTRACRANIAL BILAT STUDY: CPT | Performed by: RADIOLOGY

## 2019-09-24 PROCEDURE — 93306 TTE W/DOPPLER COMPLETE: CPT | Performed by: INTERNAL MEDICINE

## 2020-01-27 RX ORDER — AZILSARTAN KAMEDOXOMIL 80 MG/1
TABLET ORAL
Qty: 90 TABLET | Refills: 3 | Status: SHIPPED | OUTPATIENT
Start: 2020-01-27 | End: 2020-09-02 | Stop reason: SDUPTHER

## 2020-03-18 ENCOUNTER — OFFICE VISIT (OUTPATIENT)
Dept: CARDIOLOGY | Facility: CLINIC | Age: 75
End: 2020-03-18

## 2020-03-18 VITALS
HEART RATE: 72 BPM | HEIGHT: 64 IN | DIASTOLIC BLOOD PRESSURE: 60 MMHG | SYSTOLIC BLOOD PRESSURE: 140 MMHG | BODY MASS INDEX: 40.8 KG/M2 | WEIGHT: 239 LBS

## 2020-03-18 DIAGNOSIS — I27.20 PULMONARY HTN (HCC): ICD-10-CM

## 2020-03-18 DIAGNOSIS — G47.30 SLEEP APNEA IN ADULT: ICD-10-CM

## 2020-03-18 DIAGNOSIS — R25.2 LEG CRAMPS: ICD-10-CM

## 2020-03-18 DIAGNOSIS — R60.0 EDEMA LEG: ICD-10-CM

## 2020-03-18 DIAGNOSIS — I65.23 BILATERAL CAROTID ARTERY STENOSIS: ICD-10-CM

## 2020-03-18 DIAGNOSIS — Z79.4 TYPE 2 DIABETES MELLITUS WITH HYPERGLYCEMIA, WITH LONG-TERM CURRENT USE OF INSULIN (HCC): ICD-10-CM

## 2020-03-18 DIAGNOSIS — E66.01 MORBIDLY OBESE (HCC): ICD-10-CM

## 2020-03-18 DIAGNOSIS — I73.9 PVD (PERIPHERAL VASCULAR DISEASE) WITH CLAUDICATION (HCC): ICD-10-CM

## 2020-03-18 DIAGNOSIS — E11.65 TYPE 2 DIABETES MELLITUS WITH HYPERGLYCEMIA, WITH LONG-TERM CURRENT USE OF INSULIN (HCC): ICD-10-CM

## 2020-03-18 DIAGNOSIS — R00.2 PALPITATIONS: Primary | ICD-10-CM

## 2020-03-18 DIAGNOSIS — I10 ESSENTIAL (PRIMARY) HYPERTENSION: ICD-10-CM

## 2020-03-18 DIAGNOSIS — J44.9 CHRONIC OBSTRUCTIVE PULMONARY DISEASE, UNSPECIFIED COPD TYPE (HCC): ICD-10-CM

## 2020-03-18 PROCEDURE — 99213 OFFICE O/P EST LOW 20 MIN: CPT | Performed by: NURSE PRACTITIONER

## 2020-03-18 RX ORDER — ESTRADIOL 0.1 MG/G
2 CREAM VAGINAL 3 TIMES WEEKLY
COMMUNITY

## 2020-03-18 NOTE — PROGRESS NOTES
Chief Complaint   Patient presents with   • Follow-up     For cardiac management. Patient is on aspirin. States that she is needing alternative to Bystolic, states that it is very expensive. States that she has had pnuemonia. States that she wasn't approved to have surgery on her arm due to lungs. States that she is now on O2 all the time also wears while sleeping. Last lab work was done on 02/18/20 per endocrinologist, in chart under media. States that she still has palpitations but they are no worse than before.    • Med Refill     PCP does most refills. Does not need refills at this time. Brought medication list with visit.    • Leg pain     States that she has been having more pain in legs with walking, feels like back of legs are going to give out. States that rest helps. States that left leg has been swelling a lot.        Cardiac Complaints  Claudication, palpitations      Subjective   Bibiana Quezada is a 74 y.o. female with HTN, hypothyroidism, hyperlipidemia, COPD, and carotid artery disease s/p (R)CEA in January 2014. She also has a history of numerous back surgeries. She is followed by Dr. Fisher secondary to asthma/bronchitis after inhaling noxious chemicals many years ago. Sleep apnea managed with CPAP. Her blood pressure has been labile and medication changes have been made. In October 2016, repeat cardiac workup showed apical ischemia with echocardiogram with EF 65%, LVH and PA pressure 37 mmHg. Cardiac cath showed normal coronaries and hypertensive heart disease. She is followed by Dr. Marrero in [place] regarding the carotid and peripheral disease.  CTA showed around 50% stenosis at the bifurcation. Carotid US stable per patient. Dr. Dangelo, endocrinologist manages her labs. At her July 2018 office visit, she had more shortness of breath and elevated BP. Losartan stopped and Edarbi started. Increase dose of statin advised. On 8/21/18, she underwent R&L heart cath that showed significant systemic  HTN for which chlorthalidone was added. PA pressure was also high and she was advised to consider PHTN management. CTA of chest 8/28/18 done secondary to chest pain and elevated D-dimer was negative for embolism or other significant abnormality. At last visit in September 2019, dizziness and SOA reported. She was advised to repeat cardiac workup including a carotid US as well as echo. Echo showed EF of 65-70%, RVSP of 50mmHg, and NIKITA of 1.7cm2.  Carotid US showed no stenosis of right CEA and no significant stenosis of LICA.     She comes for follow up today accompanied by her . She admits to more shortness of breath which she attributes to her lungs and battling pneumonia over the winter. Patient states she received several doses of steroids and ABX in regards.  She is now wearing oxygen 24/7. She does report some claudication pain of BLE that she feels is getting better at rest, CTA done in 2018 showed 50% stenosis at the bifurcation of the aorta, but  no PVD noted with iliac arteries patent, medical management was advised per vascular surgery.  She continues to have BLE edema but no worse than prior, she continues on lasix therapy for management. Palpitations reported but have been the same for sometime. Dizziness and TIA denied.  Labs done with endocrine in February and showed:  , BUN 21, Creatinine 1.27, GFR 49, , K 4.4, AIC 8.6%.               Cardiac History  Past Surgical History:   Procedure Laterality Date   • CARDIAC CATHETERIZATION  10/24/2016    Normal Coronaries. Hypertensive Heart Disease.   • CARDIAC CATHETERIZATION  08/21/2018    Normal Coronaries. EF 65%. PA- 60/28(44). LVEDP- 14. PCW 29.   • CARDIOVASCULAR STRESS TEST  10/12/2016    L. Myoview- Apical Ischemia   • CARDIOVASCULAR STRESS TEST  05/14/2013    L. Myoview- R/O Apical Ischemia.   • CARDIOVASCULAR STRESS TEST  12/14/2011     L. Myoview- (Fl) Negative    • CARDIOVASCULAR STRESS TEST  10/12/2016    Lexiscan- small apical  ischemia, increase Imdur, cath   • CAROTID ENDARTERECTOMY  01/16/2014    (R) Endarterectomy by Dr. Clayton   • ECHO - CONVERTED  08/07/2012    EF 65%   • ECHO - CONVERTED  10/12/2016    EF 65%, mild MR, borderline PHT   • ECHO - CONVERTED  03/13/2018    @[facility]. EF 65%. RVSP_ 55 mmHg   • ECHO - CONVERTED  09/24/2019    EF 65-70%. LA- 3.8 Cm. NIKITA- 1.7 Cm2, Mild MR. RVSP- 50 mmHg.   • US CAROTID UNILATERAL  12/03/2013    @[facility]- Critical Stenosis  (R) ICA.   • US CAROTID UNILATERAL  09/24/2019    No sig stenosis of right CEA, no sig stenosis left carotid       Current Outpatient Medications   Medication Sig Dispense Refill   • albuterol (PROVENTIL HFA;VENTOLIN HFA) 108 (90 BASE) MCG/ACT inhaler Inhale 2 puffs Every 4 (Four) Hours As Needed for wheezing.     • aspirin 81 MG EC tablet Take 81 mg by mouth Daily.     • atorvastatin (LIPITOR) 10 MG tablet Take 10 mg by mouth Daily.     • B Complex Vitamins (VITAMIN-B COMPLEX PO) Take  by mouth Daily.     • Budesonide-Formoterol Fumarate (SYMBICORT IN) Inhale.     • CALCIUM PO Take  by mouth Daily.     • Cholecalciferol (VITAMIN D3) 5000 units capsule capsule Take 5,000 Units by mouth 1 (One) Time Per Week.     • diltiazem CD (CARDIZEM CD) 360 MG 24 hr capsule TAKE ONE CAPSULE BY MOUTH DAILY 90 capsule 2   • DULoxetine (CYMBALTA) 60 MG capsule Take 60 mg by mouth Daily.     • EDARBI 80 MG tablet tablet TAKE ONE TABLET BY MOUTH DAILY 90 tablet 3   • esomeprazole (nexIUM) 40 MG capsule Take 40 mg by mouth 2 (Two) Times a Day.     • estradiol (ESTRACE) 0.1 MG/GM vaginal cream Insert 2 g into the vagina 3 (Three) Times a Week.     • furosemide (LASIX) 40 MG tablet Take 40 mg by mouth Daily.     • gabapentin (NEURONTIN) 400 MG capsule Take 400 mg by mouth 4 (Four) Times a Day.     • Insulin Lispro (HUMALOG SC) Inject  under the skin.     • levothyroxine (SYNTHROID, LEVOTHROID) 137 MCG tablet Take 137 mcg by mouth Daily.     • montelukast (SINGULAIR) 10 MG tablet Take 10 mg by mouth  Every Night.     • Multiple Vitamin (MULTI VITAMIN DAILY PO) Take  by mouth.     • nebivolol (BYSTOLIC) 5 MG tablet Take 1 tablet by mouth Daily. 90 tablet 3   • O2 (OXYGEN) Inhale 2 L/min 1 (One) Time.     • pioglitazone (ACTOS) 30 MG tablet Take 30 mg by mouth Daily.     • PRIMIDONE PO Take 5 mg by mouth Every Night.     • Semaglutide,0.25 or 0.5MG/DOS, (Ozempic, 0.25 or 0.5 MG/DOSE,) 2 MG/1.5ML solution pen-injector Inject 0.25 mg under the skin into the appropriate area as directed 1 (One) Time Per Week.       No current facility-administered medications for this visit.        Augmentin [amoxicillin-pot clavulanate]; Codeine; Demerol [meperidine]; Hydrocodone; Lescol [fluvastatin sodium]; Livalo [pitavastatin]; Morphine and related; Percocet [oxycodone-acetaminophen]; Percodan [oxycodone-aspirin]; Pravachol [pravastatin sodium]; Talwin [pentazocine]; Ultram [tramadol hcl]; Zetia [ezetimibe]; Zocor [simvastatin]; Other; and Tape    Past Medical History:   Diagnosis Date   • Anemia    • Cancer (CMS/HCC)     Skin cancer removed.   • Carotid artery disease (CMS/HCC)    • Carpal tunnel syndrome    • Closed left arm fracture 06/2019   • COPD (chronic obstructive pulmonary disease) (CMS/HCC)     asthma, chronic bronchitis   • Diabetes (CMS/HCC)    • GERD (gastroesophageal reflux disease)      s/p surgery for hiatal hernia   • History of cholecystectomy    • HTN (hypertension)    • Hypercholesterolemia    • Palpitations    • Previous back surgery     x5   • S/P GA (total abdominal hysterectomy)    • Sleep apnea     wears CPAP   • TIA (transient ischemic attack)        Social History     Socioeconomic History   • Marital status:      Spouse name: Not on file   • Number of children: Not on file   • Years of education: Not on file   • Highest education level: Not on file   Tobacco Use   • Smoking status: Former Smoker   • Smokeless tobacco: Never Used   Substance and Sexual Activity   • Alcohol use: Yes      "Comment: socially, drinks one drink, screwdriver per day.    • Drug use: No       Family History   Problem Relation Age of Onset   • Heart attack Mother    • Pancreatic cancer Father    • Other Brother         GABMARV   • Hypertension Other        Review of Systems   Constitution: Negative for malaise/fatigue and night sweats.   Cardiovascular: Positive for claudication, dyspnea on exertion, leg swelling and palpitations. Negative for chest pain, cyanosis, irregular heartbeat, near-syncope, orthopnea and syncope.   Respiratory: Positive for shortness of breath and wheezing.    Musculoskeletal: Positive for joint pain and muscle cramps. Negative for back pain.   Gastrointestinal: Negative for anorexia, heartburn, melena, nausea and vomiting.   Genitourinary: Negative for dysuria, hematuria, hesitancy and nocturia.   Neurological: Negative for dizziness, light-headedness and loss of balance.   Psychiatric/Behavioral: Negative for depression. The patient is nervous/anxious.            Objective     /60 (BP Location: Left arm)   Pulse 72   Ht 162.6 cm (64.02\")   Wt 108 kg (239 lb)   BMI 41.00 kg/m²     Physical Exam   Constitutional: She is oriented to person, place, and time. She appears well-developed and well-nourished.   HENT:   Head: Normocephalic and atraumatic.   Eyes: Pupils are equal, round, and reactive to light. EOM are normal.   Neck: Normal range of motion. Neck supple.   Cardiovascular: Normal rate and regular rhythm.   Murmur heard.  Pulmonary/Chest: Effort normal and breath sounds normal.   Abdominal: Soft.   Musculoskeletal: Normal range of motion. She exhibits edema.   Neurological: She is alert and oriented to person, place, and time.   Skin: Skin is warm and dry.   Psychiatric: She has a normal mood and affect. Her behavior is normal.       Procedures    Assessment/Plan     PVD:  Order for repeat CTA of aorta with runoff advised due to moderate stenosis of bifurcation of aorta prior and " current claudication pain. ASA continued at current.     Carotid artery disease:  Most recent carotid US revealed right CEA intact without stenosis, no stenosis of LICA.  Stable as dizziness/TIA denied.     COPD/pulmonary HTN/sleep apnea: Followed by pulmonary. Patient now wearing oxygen 24/7/continues to use inhalers.  She is compliant with CPAP at night. Most recent echo showed RVSP at 50mmHg, marginally improved from 2018.      Palpitations:  Present but no worse than prior. Current cardizem therapy continued.    HTN:  Blood pressure stable but SBP high end of normal. For now, she will continue with same edarbi, CCB, and BB therapy.  Limited sodium intake advised.     DM:  Not well managed. Most recent AIC 8.6%.  Patient followed by endocrine in regards. She remains insulin dependent.    Muscle aches:  Labs for vitamin D and Mag provided.    No refills needed. We will see if insurance suggests something in place of bystolic that may be cheaper.    BMI noted at 41.00, good cardiac ADA diet advised as she has gained about 20 pounds since prior which she attributes to her steroid use for her lungs. Patient strongly urged to limit sugar/carbs/calories in her diet.     6 month follow up recommended or sooner if needed.         Problems Addressed this Visit        Cardiovascular and Mediastinum    Bilateral carotid artery disease (CMS/HCC)    Pulmonary HTN (CMS/HCC)       Respiratory    Sleep apnea in adult    COPD (chronic obstructive pulmonary disease) (CMS/[facility])    Relevant Medications    Budesonide-Formoterol Fumarate (SYMBICORT IN)       Digestive    Morbidly obese (CMS/HCC)       Endocrine    Type 2 diabetes mellitus with hyperglycemia, with long-term current use of insulin (CMS/HCC)    Relevant Medications    Semaglutide,0.25 or 0.5MG/DOS, (Ozempic, 0.25 or 0.5 MG/DOSE,) 2 MG/1.5ML solution pen-injector      Other Visit Diagnoses     Palpitations    -  Primary    Relevant Orders    Magnesium    Vitamin D 1,25  Dihydroxy    Comprehensive Metabolic Panel    PVD (peripheral vascular disease) with claudication (CMS/HCC)        Relevant Orders    CT Angio Abdominal Aorta Bilateral Iliofem Runoff    Essential (primary) hypertension         Relevant Orders    CT Angio Abdominal Aorta Bilateral Iliofem Runoff    Comprehensive Metabolic Panel    Leg cramps        Relevant Orders    Magnesium    Vitamin D 1,25 Dihydroxy    CT Angio Abdominal Aorta Bilateral Iliofem Runoff    Edema leg              Patient's Body mass index is 41 kg/m². BMI is above normal parameters. Recommendations include: nutrition counseling.                Electronically signed by RENETTA Greene March 18, 2020 17:10

## 2020-05-13 ENCOUNTER — TELEPHONE (OUTPATIENT)
Dept: CARDIOLOGY | Facility: CLINIC | Age: 75
End: 2020-05-13

## 2020-05-13 RX ORDER — METOPROLOL SUCCINATE 25 MG/1
25 TABLET, EXTENDED RELEASE ORAL DAILY
Qty: 90 TABLET | Refills: 3 | Status: SHIPPED | OUTPATIENT
Start: 2020-05-13 | End: 2020-09-02 | Stop reason: SDUPTHER

## 2020-05-13 NOTE — TELEPHONE ENCOUNTER
Patient left a  requesting to see if her Bystolic 5 mg daily can be changed to a generic.  She states she cannot afford it any longer.  She reports she paid >$500 for a 90 day supply at her last fill.

## 2020-05-13 NOTE — TELEPHONE ENCOUNTER
Patient aware of recommendations she can try metoprolol XL 25 mg once a day and stop the Bystolic.

## 2020-09-02 ENCOUNTER — OFFICE VISIT (OUTPATIENT)
Dept: CARDIOLOGY | Facility: CLINIC | Age: 75
End: 2020-09-02

## 2020-09-02 VITALS
WEIGHT: 225 LBS | TEMPERATURE: 98.9 F | DIASTOLIC BLOOD PRESSURE: 64 MMHG | SYSTOLIC BLOOD PRESSURE: 140 MMHG | HEIGHT: 64 IN | BODY MASS INDEX: 38.41 KG/M2 | HEART RATE: 68 BPM

## 2020-09-02 DIAGNOSIS — I11.9 HYPERTENSIVE HEART DISEASE WITHOUT HEART FAILURE: Primary | ICD-10-CM

## 2020-09-02 DIAGNOSIS — R06.02 SHORTNESS OF BREATH: ICD-10-CM

## 2020-09-02 DIAGNOSIS — I65.23 BILATERAL CAROTID ARTERY STENOSIS: ICD-10-CM

## 2020-09-02 DIAGNOSIS — R00.2 PALPITATIONS: ICD-10-CM

## 2020-09-02 DIAGNOSIS — I10 ESSENTIAL HYPERTENSION: ICD-10-CM

## 2020-09-02 DIAGNOSIS — Z79.4 TYPE 2 DIABETES MELLITUS WITH HYPERGLYCEMIA, WITH LONG-TERM CURRENT USE OF INSULIN (HCC): ICD-10-CM

## 2020-09-02 DIAGNOSIS — J44.9 COPD MIXED TYPE (HCC): ICD-10-CM

## 2020-09-02 DIAGNOSIS — E11.65 TYPE 2 DIABETES MELLITUS WITH HYPERGLYCEMIA, WITH LONG-TERM CURRENT USE OF INSULIN (HCC): ICD-10-CM

## 2020-09-02 DIAGNOSIS — E78.00 HYPERCHOLESTEREMIA: ICD-10-CM

## 2020-09-02 DIAGNOSIS — I27.20 PULMONARY HTN (HCC): ICD-10-CM

## 2020-09-02 DIAGNOSIS — E66.01 SEVERE OBESITY (BMI 35.0-39.9) WITH COMORBIDITY (HCC): ICD-10-CM

## 2020-09-02 PROCEDURE — 99214 OFFICE O/P EST MOD 30 MIN: CPT | Performed by: NURSE PRACTITIONER

## 2020-09-02 RX ORDER — LEVOTHYROXINE SODIUM 0.12 MG/1
125 TABLET ORAL DAILY
COMMUNITY
End: 2021-05-05

## 2020-09-02 RX ORDER — METOPROLOL SUCCINATE 25 MG/1
25 TABLET, EXTENDED RELEASE ORAL DAILY
Qty: 90 TABLET | Refills: 3 | Status: SHIPPED | OUTPATIENT
Start: 2020-09-02 | End: 2021-03-10 | Stop reason: SDUPTHER

## 2020-09-02 RX ORDER — DILTIAZEM HYDROCHLORIDE 360 MG/1
360 CAPSULE, EXTENDED RELEASE ORAL DAILY
Qty: 90 CAPSULE | Refills: 2 | Status: SHIPPED | OUTPATIENT
Start: 2020-09-02 | End: 2021-03-10 | Stop reason: SDUPTHER

## 2020-09-02 RX ORDER — CITALOPRAM 20 MG/1
20 TABLET ORAL DAILY
COMMUNITY
End: 2021-03-10 | Stop reason: ALTCHOICE

## 2020-09-02 RX ORDER — AZILSARTAN KAMEDOXOMIL 80 MG/1
80 TABLET ORAL DAILY
Qty: 90 TABLET | Refills: 2 | Status: SHIPPED | OUTPATIENT
Start: 2020-09-02 | End: 2021-01-22

## 2020-09-02 NOTE — PROGRESS NOTES
Chief Complaint   Patient presents with   • Follow-up     For cardiac management. Patient is on aspirin. Last lab work was done about a month ago per PCP, not in chart. Is going to be having surgery on Tuesday at . States that her shortness of breath is about the same as always, does use oxygen at night. States that she does have palpitations.    • Med Refill     Needs refills on cardiac medications. 90 day supplies to [facility] in [place]. Brought medication list with visit.        Cardiac Complaints  Dyspnea, palpitations      Subjective   Bibiana Quezada is a 74 y.o. female with HTN, hypothyroidism, hyperlipidemia, palpitations, COPD, and carotid artery disease s/p (R)CEA in January 2014. She also has a history of numerous back surgeries. She is followed by Dr. Fisher secondary to asthma/bronchitis after inhaling noxious chemicals many years ago. Sleep apnea managed with CPAP. Her blood pressure has been labile and medication changes have been made. In October 2016, repeat cardiac workup showed apical ischemia with echocardiogram with EF 65%, LVH and PA pressure 37 mmHg. Cardiac cath showed normal coronaries and hypertensive heart disease. She is followed by Dr. Marrero in [place] regarding the carotid and peripheral disease.  CTA showed around 50% stenosis at the bifurcation. Carotid US stable per patient. Dr. Dangelo, endocrinologist manages her labs. At her July 2018 office visit, she had more shortness of breath and elevated BP. Losartan stopped and Edarbi started. Increase dose of statin advised. On 8/21/18, she underwent R&L heart cath that showed significant systemic HTN for which chlorthalidone was added. PA pressure was also high and she was advised to consider PHTN management. CTA of chest 8/28/18 done secondary to chest pain and elevated D-dimer was negative for embolism or other significant abnormality. At last visit in September 2019, dizziness and SOA reported. She was advised to  repeat cardiac workup including a carotid US as well as echo. Echo showed EF of 65-70%, RVSP of 50mmHg, and NIKITA of 1.7cm2.  Carotid US showed no stenosis of right CEA and no significant stenosis of LICA. She is now wearing oxygen 24/7. She does report some claudication pain of BLE that she feels is getting better at rest, CTA done in 2018 showed 50% stenosis at the bifurcation of the aorta, but  no PVD noted with iliac arteries patent, medical management was advised per vascular surgery. Repeat CTA of abdomen with runoff on 3/18/2020 showed no significant peripheral stenosis and possible fatty liver.     Patient returns today for follow up and denies any new concerns. She does continue to have SOA with exertion but remains similar to prior. She does use oxygen at night, same as before. Chest pain, dizziness, and syncope denied. Palpitations reported as rare. Labs remain followed by PCP and admits most recent showed AIC elevated, patient attributes to a high carb diet. She does report surgery at  on Tuesday, she is unsure if clearance is needed. Refills are requested.                Cardiac History  Past Surgical History:   Procedure Laterality Date   • CARDIAC CATHETERIZATION  10/24/2016    Normal Coronaries. Hypertensive Heart Disease.   • CARDIAC CATHETERIZATION  08/21/2018    Normal Coronaries. EF 65%. PA- 60/28(44). LVEDP- 14. PCW 29.   • CARDIOVASCULAR STRESS TEST  10/12/2016    L. Myoview- Apical Ischemia   • CARDIOVASCULAR STRESS TEST  05/14/2013    L. Myoview- R/O Apical Ischemia.   • CARDIOVASCULAR STRESS TEST  12/14/2011     L. Myoview- (Fl) Negative    • CARDIOVASCULAR STRESS TEST  10/12/2016    Lexiscan- small apical ischemia, increase Imdur, cath   • CAROTID ENDARTERECTOMY  01/16/2014    (R) Endarterectomy by Dr. Clayton   • ECHO - CONVERTED  08/07/2012    EF 65%   • ECHO - CONVERTED  10/12/2016    EF 65%, mild MR, borderline PHT   • ECHO - CONVERTED  03/13/2018    @[facility]. EF 65%. RVSP_ 55 mmHg   • ECHO -  CONVERTED  09/24/2019    EF 65-70%. LA- 3.8 Cm. NIKITA- 1.7 Cm2, Mild MR. RVSP- 50 mmHg.   • OTHER SURGICAL HISTORY  03/18/2020    CTA with runoff:  No sig peripheral stenosis bilaterally, fatty liver   • US CAROTID UNILATERAL  12/03/2013    @[facility]- Critical Stenosis  (R) ICA.   • US CAROTID UNILATERAL  09/24/2019    No sig stenosis of right CEA, no sig stenosis left carotid       Current Outpatient Medications   Medication Sig Dispense Refill   • albuterol (PROVENTIL HFA;VENTOLIN HFA) 108 (90 BASE) MCG/ACT inhaler Inhale 2 puffs Every 4 (Four) Hours As Needed for wheezing.     • aspirin 81 MG EC tablet Take 81 mg by mouth Daily.     • atorvastatin (LIPITOR) 10 MG tablet Take 10 mg by mouth Daily.     • B Complex Vitamins (VITAMIN-B COMPLEX PO) Take  by mouth Daily.     • Budesonide-Formoterol Fumarate (SYMBICORT IN) Inhale.     • CALCIUM PO Take  by mouth Daily.     • citalopram (CeleXA) 20 MG tablet Take 20 mg by mouth Daily.     • dilTIAZem CD (CARDIZEM CD) 360 MG 24 hr capsule Take 1 capsule by mouth Daily. 90 capsule 2   • DULoxetine (CYMBALTA) 60 MG capsule Take 60 mg by mouth Daily.     • EDARBI 80 MG tablet tablet Take 1 tablet by mouth Daily. 90 tablet 2   • esomeprazole (nexIUM) 40 MG capsule Take 40 mg by mouth 2 (Two) Times a Day.     • estradiol (ESTRACE) 0.1 MG/GM vaginal cream Insert 2 g into the vagina 3 (Three) Times a Week.     • furosemide (LASIX) 40 MG tablet Take 40 mg by mouth Daily.     • gabapentin (NEURONTIN) 400 MG capsule Take 400 mg by mouth 4 (Four) Times a Day.     • Insulin Lispro (HUMALOG SC) Inject  under the skin.     • levothyroxine (SYNTHROID, LEVOTHROID) 125 MCG tablet Take 125 mcg by mouth Daily.     • metoprolol succinate XL (TOPROL-XL) 25 MG 24 hr tablet Take 1 tablet by mouth Daily. 90 tablet 3   • montelukast (SINGULAIR) 10 MG tablet Take 10 mg by mouth Every Night.     • Multiple Vitamin (MULTI VITAMIN DAILY PO) Take  by mouth.     • O2 (OXYGEN) Inhale 2 L/min 1 (One) Time.        • pioglitazone (ACTOS) 30 MG tablet Take 30 mg by mouth Daily.     • PRIMIDONE PO Take 5 mg by mouth Every Night.     • SITagliptin (JANUVIA) 25 MG tablet Take 25 mg by mouth Daily.       No current facility-administered medications for this visit.        Augmentin [amoxicillin-pot clavulanate]; Codeine; Demerol [meperidine]; Hydrocodone; Lescol [fluvastatin sodium]; Livalo [pitavastatin]; Morphine and related; Percocet [oxycodone-acetaminophen]; Percodan [oxycodone-aspirin]; Pravachol [pravastatin sodium]; Talwin [pentazocine]; Ultram [tramadol hcl]; Zetia [ezetimibe]; Zocor [simvastatin]; Other; and Tape    Past Medical History:   Diagnosis Date   • Anemia    • Cancer (CMS/HCC)     Skin cancer removed.   • Carotid artery disease (CMS/HCC)    • Carpal tunnel syndrome    • Closed left arm fracture 06/2019   • COPD (chronic obstructive pulmonary disease) (CMS/HCC)     asthma, chronic bronchitis   • Diabetes (CMS/HCC)    • GERD (gastroesophageal reflux disease)      s/p surgery for hiatal hernia   • History of cholecystectomy    • HTN (hypertension)    • Hypercholesterolemia    • Palpitations    • Previous back surgery     x5   • S/P GA (total abdominal hysterectomy)    • Sleep apnea     wears CPAP   • TIA (transient ischemic attack)        Social History     Socioeconomic History   • Marital status:      Spouse name: Not on file   • Number of children: Not on file   • Years of education: Not on file   • Highest education level: Not on file   Tobacco Use   • Smoking status: Former Smoker   • Smokeless tobacco: Never Used   Substance and Sexual Activity   • Alcohol use: Yes     Comment: socially, drinks one drink, screwdriver per day.    • Drug use: No       Family History   Problem Relation Age of Onset   • Heart attack Mother    • Pancreatic cancer Father    • Other Brother         GABG   • Hypertension Other        Review of Systems   Constitution: Negative for malaise/fatigue and night sweats.  "  Cardiovascular: Positive for dyspnea on exertion and palpitations. Negative for chest pain, claudication, irregular heartbeat, leg swelling, near-syncope, orthopnea, paroxysmal nocturnal dyspnea and syncope.   Respiratory: Positive for shortness of breath. Negative for cough and wheezing.    Musculoskeletal: Positive for stiffness. Negative for back pain and joint pain.   Gastrointestinal: Negative for anorexia, heartburn, nausea and vomiting.   Genitourinary: Negative for dysuria, hematuria, hesitancy and nocturia.   Neurological: Negative for dizziness, light-headedness and loss of balance.   Psychiatric/Behavioral: Negative for depression and memory loss. The patient is not nervous/anxious.            Objective     /64 (BP Location: Right arm)   Pulse 68   Temp 98.9 °F (37.2 °C)   Ht 162.6 cm (64.02\")   Wt 102 kg (225 lb)   BMI 38.60 kg/m²     Physical Exam   Constitutional: She is oriented to person, place, and time. She appears well-developed and well-nourished.   HENT:   Head: Normocephalic and atraumatic.   Eyes: Pupils are equal, round, and reactive to light. EOM are normal.   Neck: Normal range of motion. Neck supple.   Cardiovascular: Normal rate and regular rhythm.   Murmur heard.  Pulmonary/Chest: Effort normal and breath sounds normal.   Abdominal: Soft.   Musculoskeletal: Normal range of motion.   Neurological: She is alert and oriented to person, place, and time.   Skin: Skin is warm and dry.   Psychiatric: She has a normal mood and affect. Her behavior is normal.       Procedures    Assessment/Plan     Carotid artery disease:  Most recent carotid US in 2019 revealed right CEA intact without stenosis, no stenosis of LICA.  Stable as dizziness/TIA denied.      COPD/pulmonary HTN/sleep apnea: Followed by pulmonary. Patient now wearing oxygen 24/7/continues to use inhalers.  She is compliant with CPAP at night. Most recent echo showed RVSP at 50mmHg, marginally improved from 2018.  "      Palpitations:  Present but no worse than prior. No adjustment to CCB recommended and she has tolerated her toprol therapy well since change from bystolic.      HTN:  Blood pressure stable but SBP high end of normal. For now, she will continue with same edarbi, CCB, and BB therapy.  Limited sodium intake advised as she does report eating more junk food and processed foods.       DM:  Not well managed. She admits AIC recently elevated. Patient followed by endocrine in regards. She remains insulin dependent.     BMI noted at 38.60, good cardiac ADA diet advised.  Walking regimen encouraged.    If clearance needed for upcoming right arm surgery, she will send for clearance    Refills per request     6 month follow up recommended or sooner if needed.          Problems Addressed this Visit        Cardiovascular and Mediastinum    Hypertensive heart disease without heart failure - Primary    Relevant Medications    metoprolol succinate XL (TOPROL-XL) 25 MG 24 hr tablet    dilTIAZem CD (CARDIZEM CD) 360 MG 24 hr capsule    Essential hypertension    Relevant Medications    metoprolol succinate XL (TOPROL-XL) 25 MG 24 hr tablet    dilTIAZem CD (CARDIZEM CD) 360 MG 24 hr capsule    EDARBI 80 MG tablet tablet    Hypercholesteremia    Bilateral carotid artery disease (CMS/HCC)    Pulmonary HTN (CMS/HCC)       Endocrine    Type 2 diabetes mellitus with hyperglycemia, with long-term current use of insulin (CMS/HCC)    Relevant Medications    SITagliptin (JANUVIA) 25 MG tablet      Other Visit Diagnoses     Shortness of breath        Palpitations        COPD mixed type (CMS/HCC)        Severe obesity (BMI 35.0-39.9) with comorbidity (CMS/HCC)              Patient's Body mass index is 38.6 kg/m². BMI is above normal parameters. Recommendations include: nutrition counseling.              Electronically signed by RENETTA Greene September 2, 2020 17:04

## 2020-09-03 ENCOUNTER — TELEPHONE (OUTPATIENT)
Dept: CARDIOLOGY | Facility: CLINIC | Age: 75
End: 2020-09-03

## 2020-09-03 NOTE — TELEPHONE ENCOUNTER
Patient brought by letter from Dr. Pina needing cardiac clearance for patient to have hardware removed from arm. Patient is on aspirin, unclear if they are needing to hold. According to our records, I do not see where patient has had any stenting.       Fax [phone]

## 2020-10-16 ENCOUNTER — TELEPHONE (OUTPATIENT)
Dept: CARDIOLOGY | Facility: CLINIC | Age: 75
End: 2020-10-16

## 2020-10-16 DIAGNOSIS — R06.02 SHORTNESS OF BREATH: ICD-10-CM

## 2020-10-16 DIAGNOSIS — R60.0 EDEMA LEG: ICD-10-CM

## 2020-10-16 DIAGNOSIS — I10 ESSENTIAL HYPERTENSION: Primary | ICD-10-CM

## 2020-10-16 NOTE — TELEPHONE ENCOUNTER
Patient called stating that BP has been elevated the last several times that she has seen a doctor. With readings such as 170/50 and 180/57. Patient states that feet have been swelling more and has been having trouble breathing. She states that pulmonologist told her the last time she was in their office that her breathing problems were due to her heart.    Patient is still taking:  · Edarbi 80 mg daily  · Diltiazem  mg daily  · Metoprolol XL 25 mg daily  · Furosemide 40 mg daily    Patient is asking if she needs sooner appointment to be evaluated. I made her aware that I would send you message, but that you were gone for the day since we closed at 12 and I was given message at the end of our day. Patient was made aware that should BP stay elevated or worsen or shortness of breath worsen or she develops chest pain to go to the ER to be evaluated.

## 2020-10-19 RX ORDER — SPIRONOLACTONE 25 MG/1
25 TABLET ORAL DAILY
Qty: 30 TABLET | Refills: 11 | Status: SHIPPED | OUTPATIENT
Start: 2020-10-19 | End: 2021-03-10 | Stop reason: SDUPTHER

## 2020-10-19 NOTE — TELEPHONE ENCOUNTER
She has had multiple cardiac workups.  They have not shown cardiac concerns. She had a cath in 2018 with NORMAL coronaries. Needs a CMP and BNP.  Order will be in for both.  Also needs a script for aldactone therapy.  25mg daily.  Order in.

## 2020-10-20 NOTE — TELEPHONE ENCOUNTER
Patient was made aware of order for spironolactone 25 mg daily. Patient was made aware that orders from CMP and BNP will be sent to [facility] for her to have done. Patient verbalized understanding.

## 2021-01-22 RX ORDER — AZILSARTAN KAMEDOXOMIL 80 MG/1
TABLET ORAL
Qty: 90 TABLET | Refills: 3 | Status: SHIPPED | OUTPATIENT
Start: 2021-01-22 | End: 2021-03-10 | Stop reason: SDUPTHER

## 2021-03-10 ENCOUNTER — OFFICE VISIT (OUTPATIENT)
Dept: CARDIOLOGY | Facility: CLINIC | Age: 76
End: 2021-03-10

## 2021-03-10 VITALS
BODY MASS INDEX: 39.27 KG/M2 | WEIGHT: 230 LBS | TEMPERATURE: 97.3 F | SYSTOLIC BLOOD PRESSURE: 142 MMHG | HEIGHT: 64 IN | DIASTOLIC BLOOD PRESSURE: 70 MMHG | HEART RATE: 70 BPM

## 2021-03-10 DIAGNOSIS — R41.0 CONFUSION: ICD-10-CM

## 2021-03-10 DIAGNOSIS — Z79.4 TYPE 2 DIABETES MELLITUS WITH HYPERGLYCEMIA, WITH LONG-TERM CURRENT USE OF INSULIN (HCC): ICD-10-CM

## 2021-03-10 DIAGNOSIS — E66.01 SEVERE OBESITY (BMI 35.0-39.9) WITH COMORBIDITY (HCC): ICD-10-CM

## 2021-03-10 DIAGNOSIS — G47.30 SLEEP APNEA IN ADULT: ICD-10-CM

## 2021-03-10 DIAGNOSIS — R01.1 HEART MURMUR: ICD-10-CM

## 2021-03-10 DIAGNOSIS — E78.00 HYPERCHOLESTEREMIA: ICD-10-CM

## 2021-03-10 DIAGNOSIS — I10 ESSENTIAL HYPERTENSION: ICD-10-CM

## 2021-03-10 DIAGNOSIS — E11.65 TYPE 2 DIABETES MELLITUS WITH HYPERGLYCEMIA, WITH LONG-TERM CURRENT USE OF INSULIN (HCC): ICD-10-CM

## 2021-03-10 DIAGNOSIS — R42 DIZZINESS: Primary | ICD-10-CM

## 2021-03-10 DIAGNOSIS — J44.9 CHRONIC OBSTRUCTIVE PULMONARY DISEASE, UNSPECIFIED COPD TYPE (HCC): ICD-10-CM

## 2021-03-10 DIAGNOSIS — I65.23 BILATERAL CAROTID ARTERY STENOSIS: ICD-10-CM

## 2021-03-10 DIAGNOSIS — I11.9 HYPERTENSIVE HEART DISEASE WITHOUT HEART FAILURE: ICD-10-CM

## 2021-03-10 PROCEDURE — 99214 OFFICE O/P EST MOD 30 MIN: CPT | Performed by: NURSE PRACTITIONER

## 2021-03-10 RX ORDER — DILTIAZEM HYDROCHLORIDE 360 MG/1
360 CAPSULE, EXTENDED RELEASE ORAL DAILY
Qty: 90 CAPSULE | Refills: 2 | Status: SHIPPED | OUTPATIENT
Start: 2021-03-10 | End: 2021-05-05

## 2021-03-10 RX ORDER — AZILSARTAN KAMEDOXOMIL 80 MG/1
80 TABLET ORAL DAILY
Qty: 90 TABLET | Refills: 3 | Status: SHIPPED | OUTPATIENT
Start: 2021-03-10 | End: 2022-01-12

## 2021-03-10 RX ORDER — METOPROLOL SUCCINATE 25 MG/1
25 TABLET, EXTENDED RELEASE ORAL DAILY
Qty: 90 TABLET | Refills: 3 | Status: SHIPPED | OUTPATIENT
Start: 2021-03-10 | End: 2021-05-05

## 2021-03-10 RX ORDER — CELECOXIB 200 MG/1
200 CAPSULE ORAL DAILY
COMMUNITY
End: 2021-12-08

## 2021-03-10 RX ORDER — SPIRONOLACTONE 25 MG/1
25 TABLET ORAL DAILY
Qty: 90 TABLET | Refills: 2 | Status: SHIPPED | OUTPATIENT
Start: 2021-03-10 | End: 2021-12-08

## 2021-03-10 RX ORDER — FAMOTIDINE 40 MG/1
40 TABLET, FILM COATED ORAL NIGHTLY
COMMUNITY

## 2021-03-10 RX ORDER — AZILSARTAN KAMEDOXOMIL 80 MG/1
80 TABLET ORAL DAILY
Qty: 90 TABLET | Refills: 3 | Status: SHIPPED | OUTPATIENT
Start: 2021-03-10 | End: 2021-03-10 | Stop reason: SDUPTHER

## 2021-03-10 RX ORDER — ATORVASTATIN CALCIUM 10 MG/1
10 TABLET, FILM COATED ORAL DAILY
Qty: 90 TABLET | Refills: 2 | Status: SHIPPED | OUTPATIENT
Start: 2021-03-10 | End: 2021-12-08 | Stop reason: SDUPTHER

## 2021-03-10 NOTE — PROGRESS NOTES
Chief Complaint   Patient presents with   • Follow-up     For cardiac management. Patient is on aspirin. States that last week she woke up really foggy minded and just went back to bed, just couldn't think, worried about possible TIA, states that it took about 3 days to feel like her mind was coming back. Is no longer wearing oxygen. States that her left hand had got hard again, unsure if that is important to anything. Last lab work was done on a couple of weeks ago at [facility] per diabetes doctor, not in chart.    • Med Refill     Needs refills on cardiac medications. 90 day supplies to [facility] in [place]. Needs edarbi sent to [facility]. Brought medication list with visit.        Cardiac Complaints  dyspnea and Dizziness      Subjective   Bibiana Quezada is a 75 y.o. female  with HTN, hypothyroidism, hyperlipidemia, palpitations, COPD, and carotid artery disease s/p (R)CEA in January 2014. She also has a history of numerous back surgeries. She is followed by Dr. Fisher secondary to asthma/bronchitis after inhaling noxious chemicals many years ago. Sleep apnea managed with CPAP. Her blood pressure has been labile and medication changes have been made. In October 2016, repeat cardiac workup showed apical ischemia with echocardiogram with EF 65%, LVH and PA pressure 37 mmHg. Cardiac cath showed normal coronaries and hypertensive heart disease. She is followed by Dr. Marrero in [place] regarding the carotid and peripheral disease.  CTA showed around 50% stenosis at the bifurcation. Carotid US stable per patient. Dr. Dangelo, endocrinologist manages her labs. At her July 2018 office visit, she had more shortness of breath and elevated BP. Losartan stopped and Edarbi started. Increase dose of statin advised. On 8/21/18, she underwent R&L heart cath that showed significant systemic HTN for which chlorthalidone was added. PA pressure was also high and she was advised to consider PHTN management. CTA of chest 8/28/18  done secondary to chest pain and elevated D-dimer was negative for embolism or other significant abnormality. At last visit in September 2019, dizziness and SOA reported. She was advised to repeat cardiac workup including a carotid US as well as echo. Echo showed EF of 65-70%, RVSP of 50mmHg, and NIKITA of 1.7cm2.  Carotid US showed no stenosis of right CEA and no significant stenosis of LICA. She is now wearing oxygen 24/7. She does report some claudication pain of BLE that she feels is getting better at rest, CTA done in 2018 showed 50% stenosis at the bifurcation of the aorta, but  no PVD noted with iliac arteries patent, medical management was advised per vascular surgery. Repeat CTA of abdomen with runoff on 3/18/2020 showed no significant peripheral stenosis and possible fatty liver.     Patient comes today for follow up and reports what she thinks was a possible TIA. She states she woke up about a week ago and was foggy winded and went back to sleep, she states she had a hard time thinking after. She admits it took 3 days to feel like herself after the event. No reported palpitations at that time. She does admit to some issues with her left hand. As well. No chest pain reported. She does have some SOA but is no longer on oxygen therapy and doing well. Chest pain, palpitations, dizziness, and syncope denied. Labs have remained followed by specialty, she is unsure of last check, but thinks it was in December, lipids included, no current available. Refills of cardiac meds requested for 90 day supply.            Cardiac History  Past Surgical History:   Procedure Laterality Date   • CARDIAC CATHETERIZATION  10/24/2016    Normal Coronaries. Hypertensive Heart Disease.   • CARDIAC CATHETERIZATION  08/21/2018    Normal Coronaries. EF 65%. PA- 60/28(44). LVEDP- 14. PCW 29.   • CARDIOVASCULAR STRESS TEST  10/12/2016    L. Myoview- Apical Ischemia   • CARDIOVASCULAR STRESS TEST  05/14/2013    L. Myoview- R/O Apical  Ischemia.   • CARDIOVASCULAR STRESS TEST  12/14/2011     L. Myoview- (Fl) Negative    • CARDIOVASCULAR STRESS TEST  10/12/2016    Lexiscan- small apical ischemia, increase Imdur, cath   • CAROTID ENDARTERECTOMY  01/16/2014    (R) Endarterectomy by Dr. Clayton   • ECHO - CONVERTED  08/07/2012    EF 65%   • ECHO - CONVERTED  10/12/2016    EF 65%, mild MR, borderline PHT   • ECHO - CONVERTED  03/13/2018    @[facility]. EF 65%. RVSP_ 55 mmHg   • ECHO - CONVERTED  09/24/2019    EF 65-70%. LA- 3.8 Cm. NIKITA- 1.7 Cm2, Mild MR. RVSP- 50 mmHg.   • OTHER SURGICAL HISTORY  03/18/2020    CTA with runoff:  No sig peripheral stenosis bilaterally, fatty liver   • US CAROTID UNILATERAL  12/03/2013    @[facility]- Critical Stenosis  (R) ICA.   • US CAROTID UNILATERAL  09/24/2019    No sig stenosis of right CEA, no sig stenosis left carotid       Current Outpatient Medications   Medication Sig Dispense Refill   • albuterol (PROVENTIL HFA;VENTOLIN HFA) 108 (90 BASE) MCG/ACT inhaler Inhale 2 puffs Every 4 (Four) Hours As Needed for wheezing.     • aspirin 81 MG EC tablet Take 81 mg by mouth Daily.     • atorvastatin (LIPITOR) 10 MG tablet Take 1 tablet by mouth Daily. 90 tablet 2   • B Complex Vitamins (VITAMIN-B COMPLEX PO) Take  by mouth Daily.     • CALCIUM PO Take  by mouth Daily.     • celecoxib (CeleBREX) 200 MG capsule Take 200 mg by mouth Daily.     • dilTIAZem CD (CARDIZEM CD) 360 MG 24 hr capsule Take 1 capsule by mouth Daily. 90 capsule 2   • DULoxetine (CYMBALTA) 60 MG capsule Take 60 mg by mouth Daily.     • Edarbi 80 MG tablet tablet Take 1 tablet by mouth Daily. 90 tablet 3   • esomeprazole (nexIUM) 40 MG capsule Take 40 mg by mouth 2 (Two) Times a Day.     • estradiol (ESTRACE) 0.1 MG/GM vaginal cream Insert 2 g into the vagina 3 (Three) Times a Week.     • famotidine (PEPCID) 40 MG tablet Take 40 mg by mouth Every Night.     • furosemide (LASIX) 40 MG tablet Take 40 mg by mouth Daily.     • gabapentin (NEURONTIN) 400 MG capsule Take 400  mg by mouth 4 (Four) Times a Day.     • Insulin Lispro (HUMALOG SC) Inject  under the skin.     • levothyroxine (SYNTHROID, LEVOTHROID) 125 MCG tablet Take 125 mcg by mouth Daily.     • metFORMIN (GLUCOPHAGE) 500 MG tablet Take 500 mg by mouth 2 (Two) Times a Day With Meals.     • metoprolol succinate XL (TOPROL-XL) 25 MG 24 hr tablet Take 1 tablet by mouth Daily. 90 tablet 3   • montelukast (SINGULAIR) 10 MG tablet Take 10 mg by mouth Every Night.     • Multiple Vitamin (MULTI VITAMIN DAILY PO) Take  by mouth.     • pioglitazone (ACTOS) 30 MG tablet Take 30 mg by mouth Daily.     • PRIMIDONE PO Take 5 mg by mouth Every Night.     • spironolactone (ALDACTONE) 25 MG tablet Take 1 tablet by mouth Daily. 90 tablet 2     No current facility-administered medications for this visit.       Augmentin [amoxicillin-pot clavulanate], Codeine, Demerol [meperidine], Hydrocodone, Lescol [fluvastatin sodium], Livalo [pitavastatin], Morphine and related, Percocet [oxycodone-acetaminophen], Percodan [oxycodone-aspirin], Pravachol [pravastatin sodium], Talwin [pentazocine], Ultram [tramadol hcl], Zetia [ezetimibe], Zocor [simvastatin], Other, and Tape    Past Medical History:   Diagnosis Date   • Anemia    • Cancer (CMS/HCC)     Skin cancer removed.   • Carotid artery disease (CMS/HCC)    • Carpal tunnel syndrome    • Closed left arm fracture 06/2019   • COPD (chronic obstructive pulmonary disease) (CMS/HCC)     asthma, chronic bronchitis   • Diabetes (CMS/HCC)    • GERD (gastroesophageal reflux disease)      s/p surgery for hiatal hernia   • History of cholecystectomy    • HTN (hypertension)    • Hypercholesterolemia    • Palpitations    • Previous back surgery     x5   • S/P GA (total abdominal hysterectomy)    • Sleep apnea     wears CPAP   • TIA (transient ischemic attack)        Social History     Socioeconomic History   • Marital status:      Spouse name: Not on file   • Number of children: Not on file   • Years of  "education: Not on file   • Highest education level: Not on file   Tobacco Use   • Smoking status: Former Smoker   • Smokeless tobacco: Never Used   Substance and Sexual Activity   • Alcohol use: Yes     Comment: socially, about once a month   • Drug use: No       Family History   Problem Relation Age of Onset   • Heart attack Mother    • Pancreatic cancer Father    • Other Brother         GABG   • Hypertension Other        Review of Systems   Constitutional: Negative for malaise/fatigue and night sweats.   Cardiovascular: Positive for dyspnea on exertion. Negative for chest pain, claudication, irregular heartbeat, leg swelling, near-syncope, orthopnea, palpitations and syncope.   Respiratory: Positive for shortness of breath. Negative for cough and wheezing.    Musculoskeletal: Positive for stiffness. Negative for back pain and joint pain.   Gastrointestinal: Negative for anorexia, heartburn, melena, nausea and vomiting.   Genitourinary: Negative for dysuria, hematuria, hesitancy and nocturia.   Neurological: Positive for dizziness. Negative for light-headedness and loss of balance.   Psychiatric/Behavioral: Negative for depression and memory loss. The patient is not nervous/anxious.            Objective     /70 (BP Location: Right arm)   Pulse 70   Temp 97.3 °F (36.3 °C)   Ht 162.6 cm (64.02\")   Wt 104 kg (230 lb)   BMI 39.46 kg/m²     Constitutional:       Appearance: Not in distress.   Eyes:      Pupils: Pupils are equal, round, and reactive to light.   HENT:      Nose: Nose normal.   Pulmonary:      Effort: Pulmonary effort is normal.      Breath sounds: Normal breath sounds.   Cardiovascular:      PMI at left midclavicular line. Normal rate. Regular rhythm.      Murmurs: There is a systolic murmur.   Abdominal:      Palpations: Abdomen is soft.   Musculoskeletal: Normal range of motion.      Cervical back: Normal range of motion and neck supple. Skin:     General: Skin is warm and dry.   "   Neurological:      Mental Status: Oriented to person, place and time.         Procedures    Assessment/Plan     Carotid artery disease:  Most recent carotid US in 2019 revealed right CEA intact without stenosis, no stenosis of LICA.  Does report what she describes as a TIA. No palpitations before event reported. Carotid US advised to assess for any areas of stenosis. More recommendations to follow.     COPD/pulmonary HTN/sleep apnea: Followed by pulmonary, no longer using oxygen therapy. She is compliant with CPAP at night. Most recent echo showed RVSP at 50mmHg, marginally improved from 2018.      Dizziness/ AS/possible TIA:  Repeat echo advised to assess valvular areas as well as LV function. More recommendations to follow.      Palpitations:  Well managed with cardizem and meoprolol therapy.  Same will be continued. Limited caffeine intake advised.     HTN:  Blood pressure stable but SBP high end of normal. For now, she will continue with same edarbi, CCB, and BB therapy. Limited sodium intake advised as she does report eating more junk food and processed foods.       DM:  She is unsure of last check but was checked in December 2020.  She remains insulin dependent.      BMI noted at 39.46, good cardiac ADA diet advised.  Walking regimen encouraged.      Refills per request     6 month follow up recommended or sooner if needed.         Problems Addressed this Visit        Cardiac and Vasculature    Hypertensive heart disease without heart failure    Relevant Medications    dilTIAZem CD (CARDIZEM CD) 360 MG 24 hr capsule    metoprolol succinate XL (TOPROL-XL) 25 MG 24 hr tablet    Essential hypertension    Relevant Medications    dilTIAZem CD (CARDIZEM CD) 360 MG 24 hr capsule    Edarbi 80 MG tablet tablet    metoprolol succinate XL (TOPROL-XL) 25 MG 24 hr tablet    spironolactone (ALDACTONE) 25 MG tablet    Hypercholesteremia    Relevant Medications    atorvastatin (LIPITOR) 10 MG tablet    Bilateral carotid  artery disease (CMS/[facility])       Endocrine and Metabolic    Type 2 diabetes mellitus with hyperglycemia, with long-term current use of insulin (CMS/[facility])    Relevant Medications    metFORMIN (GLUCOPHAGE) 500 MG tablet       Pulmonary and Pneumonias    COPD (chronic obstructive pulmonary disease) (CMS/[facility])       Sleep    Sleep apnea in adult      Other Visit Diagnoses     Dizziness    -  Primary    Relevant Orders    Adult Transthoracic Echo Complete W/ Cont if Necessary Per Protocol    US Carotid Bilateral    Confusion        Relevant Orders    Adult Transthoracic Echo Complete W/ Cont if Necessary Per Protocol    US Carotid Bilateral    Heart murmur        Relevant Orders    Adult Transthoracic Echo Complete W/ Cont if Necessary Per Protocol    Severe obesity (BMI 35.0-39.9) with comorbidity (CMS/[facility])          Diagnoses       Codes Comments    Dizziness    -  Primary ICD-10-CM: R42  ICD-9-CM: 780.4     Confusion     ICD-10-CM: R41.0  ICD-9-CM: 298.9     Heart murmur     ICD-10-CM: R01.1  ICD-9-CM: 785.2     Essential hypertension     ICD-10-CM: I10  ICD-9-CM: 401.9     Hypercholesteremia     ICD-10-CM: E78.00  ICD-9-CM: 272.0     Hypertensive heart disease without heart failure     ICD-10-CM: I11.9  ICD-9-CM: 402.90     Bilateral carotid artery stenosis     ICD-10-CM: I65.23  ICD-9-CM: 433.10, 433.30     Chronic obstructive pulmonary disease, unspecified COPD type (CMS/[facility])     ICD-10-CM: J44.9  ICD-9-CM: 496     Sleep apnea in adult     ICD-10-CM: G47.30  ICD-9-CM: 327.23     Type 2 diabetes mellitus with hyperglycemia, with long-term current use of insulin (CMS/[facility])     ICD-10-CM: E11.65, Z79.4  ICD-9-CM: 250.00, 790.29, V58.67     Severe obesity (BMI 35.0-39.9) with comorbidity (CMS/[facility])     ICD-10-CM: E66.01  ICD-9-CM: 278.01           Patient's Body mass index is 39.46 kg/m². BMI is above normal parameters. Recommendations include: nutrition counseling.              Electronically signed by RENETTA Greene  March 10, 2021 16:30 EST

## 2021-03-26 ENCOUNTER — TELEPHONE (OUTPATIENT)
Dept: CARDIOLOGY | Facility: CLINIC | Age: 76
End: 2021-03-26

## 2021-03-26 NOTE — TELEPHONE ENCOUNTER
Patient called stating that she started running a fever, heart rate was 118-130, and having chest pain in center of chest. Patient was made aware to go to ER to be evaluated. Patient verbalized understanding.

## 2021-03-29 ENCOUNTER — OUTSIDE FACILITY SERVICE (OUTPATIENT)
Dept: CARDIOLOGY | Facility: CLINIC | Age: 76
End: 2021-03-29

## 2021-03-29 ENCOUNTER — APPOINTMENT (OUTPATIENT)
Dept: CARDIOLOGY | Facility: HOSPITAL | Age: 76
End: 2021-03-29

## 2021-03-29 ENCOUNTER — HOSPITAL ENCOUNTER (OUTPATIENT)
Dept: CARDIOLOGY | Facility: HOSPITAL | Age: 76
End: 2021-03-29

## 2021-03-29 PROCEDURE — 93458 L HRT ARTERY/VENTRICLE ANGIO: CPT | Performed by: INTERNAL MEDICINE

## 2021-03-29 PROCEDURE — 99223 1ST HOSP IP/OBS HIGH 75: CPT | Performed by: INTERNAL MEDICINE

## 2021-03-30 ENCOUNTER — OUTSIDE FACILITY SERVICE (OUTPATIENT)
Dept: CARDIOLOGY | Facility: CLINIC | Age: 76
End: 2021-03-30

## 2021-03-30 PROCEDURE — 99232 SBSQ HOSP IP/OBS MODERATE 35: CPT | Performed by: INTERNAL MEDICINE

## 2021-05-05 ENCOUNTER — OFFICE VISIT (OUTPATIENT)
Dept: CARDIOLOGY | Facility: CLINIC | Age: 76
End: 2021-05-05

## 2021-05-05 VITALS
WEIGHT: 231.8 LBS | HEIGHT: 64 IN | HEART RATE: 60 BPM | SYSTOLIC BLOOD PRESSURE: 140 MMHG | DIASTOLIC BLOOD PRESSURE: 52 MMHG | TEMPERATURE: 97.3 F | BODY MASS INDEX: 39.57 KG/M2

## 2021-05-05 DIAGNOSIS — I73.9 PVD (PERIPHERAL VASCULAR DISEASE) (HCC): ICD-10-CM

## 2021-05-05 DIAGNOSIS — I65.23 BILATERAL CAROTID ARTERY STENOSIS: ICD-10-CM

## 2021-05-05 DIAGNOSIS — G47.30 SLEEP APNEA IN ADULT: ICD-10-CM

## 2021-05-05 DIAGNOSIS — R00.2 PALPITATIONS: Primary | ICD-10-CM

## 2021-05-05 DIAGNOSIS — I11.9 HYPERTENSIVE HEART DISEASE WITHOUT HEART FAILURE: ICD-10-CM

## 2021-05-05 DIAGNOSIS — I10 ESSENTIAL HYPERTENSION: ICD-10-CM

## 2021-05-05 DIAGNOSIS — E78.00 HYPERCHOLESTEREMIA: ICD-10-CM

## 2021-05-05 PROCEDURE — 99214 OFFICE O/P EST MOD 30 MIN: CPT | Performed by: NURSE PRACTITIONER

## 2021-05-05 RX ORDER — DILTIAZEM HYDROCHLORIDE 120 MG/1
CAPSULE, COATED, EXTENDED RELEASE ORAL
COMMUNITY
End: 2021-12-08 | Stop reason: SDUPTHER

## 2021-05-05 RX ORDER — MULTIVIT WITH MINERALS/LUTEIN
1000 TABLET ORAL DAILY
COMMUNITY

## 2021-05-05 RX ORDER — LEVOTHYROXINE SODIUM 0.1 MG/1
100 TABLET ORAL DAILY
COMMUNITY

## 2021-05-05 NOTE — PROGRESS NOTES
Chief Complaint   Patient presents with   • Hospital follow up     Patient was recently in [facility] for chest pain, had stress, echo and cardiac cath with normal coronaries.   • Lab     Last labs at [facility]. She had [employer] covid vaccine 3/8/21.   • Blood pressure     She checks daily 150s/50s with HR 50-65.   • Irregular Heart Beat     Feels like heart is skipping beats at times. She has had a lot of stress for the last 2 weeks, caring for brother and the passing of brother.   • Med Refill     Needs refills on metoprolol tartrate-90 day.     Subjective       Bibiana Quezada is a 75 y.o. female with HTN, hypothyroidism, hyperlipidemia, carotid artery disease s/p (R)CEA in January 2014.  She also has significant VALERIE, COPD, asthma/bronchitis after inhaling noxious chemicals many years ago followed by Dr. Fisher.  Cardiac cath in 2016 and again in 2018 showed normal coronaries, hypertensive heart disease. PA pressure was also high and she was advised to consider PHTN management  Multiple antihypertensive combinations have been tried.     She returns today for hospital follow-up visit.  On 3/30/2021, she developed chilling, diaphoresis, intermittent substernal chest discomfort.  She was seen at [facility] with mildly elevated blood pressure, borderline elevated troponin and was transferred to [facility].  Initial work-up unremarkable, troponin trended down, EKG stable.  Blood cultures were negative.  Cardiac work-up including stress test and echocardiogram showed no ischemia, normal LV function, normal LA. Cardiac cath was then performed revealing normal coronary arteries, normal LVEF, mild to moderate MR. Troponin bump felt to be related to elevated BP and heart rate.  She was advised to resume CCB and beta-blocker.  Spironolactone also added at discharge.  Labs 3/29/2021: , , HDL 30, LDL 71, TSH 0.215, GFR 39, mag 1.9, K4.2, H/H 12.2/38.7.  She continues to have occasional fluttering and skipping which does not last  long but seems to be different than what she has felt before.  She is planning to have a redo partial left knee replacement in [place] soon.         Cardiac History:    Past Surgical History:   Procedure Laterality Date   • CARDIAC CATHETERIZATION  10/24/2016    Normal Coronaries. Hypertensive Heart Disease.   • CARDIAC CATHETERIZATION  08/21/2018    Normal Coronaries. EF 65%. PA- 60/28(44). LVEDP- 14. PCW 29.   • CARDIAC CATHETERIZATION  03/29/2021    Normal coronaries, normal EF   • CARDIOVASCULAR STRESS TEST  10/12/2016    L. Myoview- Apical Ischemia   • CARDIOVASCULAR STRESS TEST  05/14/2013    L. Myoview- R/O Apical Ischemia.   • CARDIOVASCULAR STRESS TEST  12/14/2011     L. Myoview- (Fl) Negative    • CARDIOVASCULAR STRESS TEST  10/12/2016    Lexiscan- small apical ischemia, increase Imdur, cath   • CARDIOVASCULAR STRESS TEST  03/27/2021    ([facility]) Lexiscan-no ischemia, EF 76%   • CAROTID ENDARTERECTOMY  01/16/2014    (R) Endarterectomy by Dr. Clayton   • ECHO - CONVERTED  08/07/2012    EF 65%   • ECHO - CONVERTED  10/12/2016    EF 65%, mild MR, borderline PHT   • ECHO - CONVERTED  03/13/2018    @[facility]. EF 65%. RVSP_ 55 mmHg   • ECHO - CONVERTED  09/24/2019    EF 65-70%. LA- 3.8 Cm. NIKITA- 1.7 Cm2, Mild MR. RVSP- 50 mmHg.   • ECHO - CONVERTED  03/26/2021    ([facility]) EF normal, LA normal, RVSP 19 mmHg   • OTHER SURGICAL HISTORY  03/18/2020    CTA with runoff:  No sig peripheral stenosis bilaterally, fatty liver   • US CAROTID UNILATERAL  12/03/2013    @[facility]- Critical Stenosis  (R) ICA.   • US CAROTID UNILATERAL  09/24/2019    No sig stenosis of right CEA, no sig stenosis left carotid   • US CAROTID UNILATERAL  03/27/2021    <50% stenosis bilaterally     Current Outpatient Medications   Medication Sig Dispense Refill   • albuterol (PROVENTIL HFA;VENTOLIN HFA) 108 (90 BASE) MCG/ACT inhaler Inhale 2 puffs Every 4 (Four) Hours As Needed for wheezing.     • ascorbic acid (VITAMIN C) 1000 MG tablet Take 1,000 mg by mouth  Daily.     • aspirin 81 MG EC tablet Take 81 mg by mouth Daily.     • atorvastatin (LIPITOR) 10 MG tablet Take 1 tablet by mouth Daily. 90 tablet 2   • B Complex Vitamins (VITAMIN-B COMPLEX PO) Take  by mouth Daily.     • CALCIUM PO Take  by mouth Daily.     • celecoxib (CeleBREX) 200 MG capsule Take 200 mg by mouth Daily.     • dilTIAZem CD (CARDIZEM CD) 120 MG 24 hr capsule 3 tablets at night     • DULoxetine (CYMBALTA) 60 MG capsule Take 60 mg by mouth Daily.     • Edarbi 80 MG tablet tablet Take 1 tablet by mouth Daily. 90 tablet 3   • esomeprazole (nexIUM) 40 MG capsule Take 40 mg by mouth 2 (Two) Times a Day.     • estradiol (ESTRACE) 0.1 MG/GM vaginal cream Insert 2 g into the vagina 3 (Three) Times a Week.     • famotidine (PEPCID) 40 MG tablet Take 40 mg by mouth Every Night.     • furosemide (LASIX) 40 MG tablet Take 40 mg by mouth Daily.     • gabapentin (NEURONTIN) 400 MG capsule Take 400 mg by mouth 4 (Four) Times a Day.     • Insulin Lispro (HUMALOG SC) Inject  under the skin.     • levothyroxine (SYNTHROID, LEVOTHROID) 100 MCG tablet Take 100 mcg by mouth Daily.     • metoprolol tartrate (LOPRESSOR) 25 MG tablet Take 1 tablet by mouth 2 (Two) Times a Day. 180 tablet 3   • montelukast (SINGULAIR) 10 MG tablet Take 10 mg by mouth Every Night.     • Multiple Vitamin (MULTI VITAMIN DAILY PO) Take  by mouth.     • pioglitazone (ACTOS) 30 MG tablet Take 30 mg by mouth Daily.     • PRIMIDONE PO Take 5 mg by mouth Every Night.     • spironolactone (ALDACTONE) 25 MG tablet Take 1 tablet by mouth Daily. 90 tablet 2     No current facility-administered medications for this visit.     Augmentin [amoxicillin-pot clavulanate], Codeine, Demerol [meperidine], Hydrocodone, Lescol [fluvastatin sodium], Livalo [pitavastatin], Morphine and related, Percocet [oxycodone-acetaminophen], Percodan [oxycodone-aspirin], Pravachol [pravastatin sodium], Talwin [pentazocine], Ultram [tramadol hcl], Zetia [ezetimibe], Zocor  [simvastatin], Other, and Tape    Past Medical History:   Diagnosis Date   • Anemia    • Cancer (CMS/HCC)     Skin cancer removed.   • Carotid artery disease (CMS/HCC)    • Carpal tunnel syndrome    • Closed left arm fracture 06/2019   • COPD (chronic obstructive pulmonary disease) (CMS/HCC)     asthma, chronic bronchitis   • Diabetes (CMS/HCC)    • GERD (gastroesophageal reflux disease)      s/p surgery for hiatal hernia   • History of cholecystectomy    • HTN (hypertension)    • Hypercholesterolemia    • Palpitations    • Previous back surgery     x5   • S/P GA (total abdominal hysterectomy)    • Sleep apnea     wears CPAP   • TIA (transient ischemic attack)      Social History     Socioeconomic History   • Marital status:      Spouse name: Not on file   • Number of children: Not on file   • Years of education: Not on file   • Highest education level: Not on file   Tobacco Use   • Smoking status: Former Smoker   • Smokeless tobacco: Never Used   Vaping Use   • Vaping Use: Never used   Substance and Sexual Activity   • Alcohol use: Not Currently     Comment: socially, about once a month   • Drug use: No     Family History   Problem Relation Age of Onset   • Heart attack Mother    • Pancreatic cancer Father    • Other Brother         GABG   • Hypertension Other      Review of Systems   Constitutional: Positive for malaise/fatigue and weight gain (Up 1). Negative for decreased appetite.   HENT: Negative.    Eyes: Negative for blurred vision.   Cardiovascular: Positive for dyspnea on exertion (Not a new symptom) and palpitations. Negative for chest pain (None since discharge), leg swelling (Improved) and syncope.   Respiratory: Positive for shortness of breath and sleep disturbances due to breathing (Compliant with CPAP).    Endocrine: Negative.    Hematologic/Lymphatic: Negative for bleeding problem. Does not bruise/bleed easily.   Skin: Negative.    Musculoskeletal: Positive for arthritis and joint pain.  "Negative for falls and myalgias.   Gastrointestinal: Negative for abdominal pain, heartburn and melena.   Genitourinary: Negative for hematuria.   Neurological: Negative for dizziness and light-headedness.   Psychiatric/Behavioral: Negative for altered mental status.   Allergic/Immunologic: Negative.         Objective     /52 (BP Location: Right arm)   Pulse 60   Temp 97.3 °F (36.3 °C)   Ht 162.6 cm (64.02\")   Wt 105 kg (231 lb 12.8 oz)   BMI 39.77 kg/m²     Vitals and nursing note reviewed.   Constitutional:       General: Not in acute distress.     Appearance: Well-developed. Not diaphoretic.   Eyes:      Pupils: Pupils are equal, round, and reactive to light.   HENT:      Head: Normocephalic.   Pulmonary:      Effort: Pulmonary effort is normal. No respiratory distress.      Breath sounds: Normal breath sounds.   Cardiovascular:      Normal rate. Regular rhythm.      Murmurs: There is a grade 1 to 2/6 systolic murmur at the apex.   Pulses:     Intact distal pulses.   Edema:     Peripheral edema present.     Ankle: bilateral trace edema of the ankle.     Feet: bilateral trace edema of the feet.  Abdominal:      General: Bowel sounds are normal.      Palpations: Abdomen is soft.   Musculoskeletal: Normal range of motion.      Cervical back: Normal range of motion. Skin:     General: Skin is warm and dry.   Neurological:      Mental Status: Alert and oriented to person, place, and time.        Procedures          Problem List Items Addressed This Visit        Cardiac and Vasculature    Hypertensive heart disease without heart failure    Relevant Medications    dilTIAZem CD (CARDIZEM CD) 120 MG 24 hr capsule    metoprolol tartrate (LOPRESSOR) 25 MG tablet    Essential hypertension    Relevant Medications    dilTIAZem CD (CARDIZEM CD) 120 MG 24 hr capsule    metoprolol tartrate (LOPRESSOR) 25 MG tablet    Hypercholesteremia    Bilateral carotid artery disease (CMS/HCC)    PVD (peripheral vascular disease) " (CMS/[facility])       Sleep    Sleep apnea in adult      Other Visit Diagnoses     Palpitations    -  Primary    Relevant Orders    Mobile Cardiac Outpatient Telemetry         1.  HTN-well-controlled, continue current medications.  Limit sodium.  Weight loss.  Refill sent for metoprolol.    2.  Asthma/VALERIE-compliant with CPAP, follows with Dr. Fisher.    3.  Recent hospitalization with borderline elevated troponin, possibly related to elevated blood pressure and heart rate.  We reviewed cardiac cath, echo revealing normal coronaries and normal LVEF.  Continue risk factor modification    4.  Palpitations-30-day event monitor to rule out PAF.    5.  Hyperlipidemia-LDL well controlled with Lipitor.  Continue the same.    6.  Carotid stenosis-ultrasound repeated during recent inpatient admission revealing 50% or less bilaterally, continue to monitor.    7.  PAD-denies claudication, she does have left knee pain which will be surgically repaired soon CTA LE in 2020 showed no significant stenosis.    8.  Hypothyroidism-she was hyper 0.2 on recent admission, may have been the trigger for elevated BP/HR.  Dose has been adjusted.    Further recommendations to follow the event monitor.  Continue CCB, BB, ARB.  We will see her back in 6 months or sooner if needed.    Patient's (Body mass index is 39.77 kg/m².) indicates that they are obese (BMI >30) with obesity-related health conditions that include obstructive sleep apnea, hypertension, coronary heart disease and diabetes mellitus . Obesity is unchanged. BMI is is above average; BMI management plan is completed.                Electronically signed by RENETTA Eng,  May 5, 2021 16:52 EDT

## 2021-05-11 ENCOUNTER — TELEPHONE (OUTPATIENT)
Dept: CARDIOLOGY | Facility: CLINIC | Age: 76
End: 2021-05-11

## 2021-05-11 NOTE — TELEPHONE ENCOUNTER
Received fax from Dr. Asael Jones for cardiac clearance for patient to have a left TKA revision. Patient is on aspirin, unclear if needing to hold. According to our records, I do not see where patient has had any stenting. Patient had cath on 03/29/21 that showed normal coronaries, normal EF.         Fax [phone]

## 2021-07-06 ENCOUNTER — TELEPHONE (OUTPATIENT)
Dept: CARDIOLOGY | Facility: CLINIC | Age: 76
End: 2021-07-06

## 2021-07-06 NOTE — TELEPHONE ENCOUNTER
Received fax from Dr. Alvarez Ram for cardiac clearance for patient to have a removal of SCS leads and battery. Patient is on aspirin and they are requesting to hold. According to our records, I do not see where patient has had any stenting.       Fax [phone]

## 2021-09-21 ENCOUNTER — TELEPHONE (OUTPATIENT)
Dept: CARDIOLOGY | Facility: CLINIC | Age: 76
End: 2021-09-21

## 2021-09-21 RX ORDER — NITROGLYCERIN 0.4 MG/1
TABLET SUBLINGUAL
Qty: 30 TABLET | Refills: 1 | Status: SHIPPED | OUTPATIENT
Start: 2021-09-21

## 2021-11-09 ENCOUNTER — TELEPHONE (OUTPATIENT)
Dept: CARDIOLOGY | Facility: CLINIC | Age: 76
End: 2021-11-09

## 2021-11-09 NOTE — TELEPHONE ENCOUNTER
Received fax from Dr. Alvarez Ram for cardiac clearance for patient to have a removal of SCS. Patient is on aspirin and they are requesting to hold for 5 days before surgery and to resume 2 days after surgery. According to our records, I do not see where patient has had any stenting.         Fax [phone]

## 2021-12-08 ENCOUNTER — OFFICE VISIT (OUTPATIENT)
Dept: CARDIOLOGY | Facility: CLINIC | Age: 76
End: 2021-12-08

## 2021-12-08 VITALS
DIASTOLIC BLOOD PRESSURE: 78 MMHG | HEIGHT: 64 IN | WEIGHT: 237.4 LBS | TEMPERATURE: 97.1 F | SYSTOLIC BLOOD PRESSURE: 124 MMHG | BODY MASS INDEX: 40.53 KG/M2 | HEART RATE: 52 BPM

## 2021-12-08 DIAGNOSIS — E78.00 HYPERCHOLESTEREMIA: ICD-10-CM

## 2021-12-08 DIAGNOSIS — E11.65 TYPE 2 DIABETES MELLITUS WITH HYPERGLYCEMIA, WITH LONG-TERM CURRENT USE OF INSULIN (HCC): ICD-10-CM

## 2021-12-08 DIAGNOSIS — R00.2 PALPITATIONS: ICD-10-CM

## 2021-12-08 DIAGNOSIS — I27.20 PULMONARY HTN (HCC): ICD-10-CM

## 2021-12-08 DIAGNOSIS — Z79.4 TYPE 2 DIABETES MELLITUS WITH HYPERGLYCEMIA, WITH LONG-TERM CURRENT USE OF INSULIN (HCC): ICD-10-CM

## 2021-12-08 DIAGNOSIS — R06.02 SHORTNESS OF BREATH: Primary | ICD-10-CM

## 2021-12-08 DIAGNOSIS — G89.29 CHRONIC PAIN OF LEFT KNEE: ICD-10-CM

## 2021-12-08 DIAGNOSIS — M25.562 CHRONIC PAIN OF LEFT KNEE: ICD-10-CM

## 2021-12-08 DIAGNOSIS — I65.23 BILATERAL CAROTID ARTERY STENOSIS: ICD-10-CM

## 2021-12-08 DIAGNOSIS — E66.01 MORBID OBESITY WITH BMI OF 40.0-44.9, ADULT (HCC): ICD-10-CM

## 2021-12-08 DIAGNOSIS — I10 ESSENTIAL HYPERTENSION: ICD-10-CM

## 2021-12-08 PROCEDURE — 99214 OFFICE O/P EST MOD 30 MIN: CPT | Performed by: NURSE PRACTITIONER

## 2021-12-08 RX ORDER — DILTIAZEM HYDROCHLORIDE 120 MG/1
CAPSULE, COATED, EXTENDED RELEASE ORAL
Qty: 90 CAPSULE | Refills: 2 | Status: SHIPPED | OUTPATIENT
Start: 2021-12-08 | End: 2022-06-28 | Stop reason: DRUGHIGH

## 2021-12-08 RX ORDER — ATORVASTATIN CALCIUM 10 MG/1
10 TABLET, FILM COATED ORAL DAILY
Qty: 90 TABLET | Refills: 2 | Status: SHIPPED | OUTPATIENT
Start: 2021-12-08 | End: 2023-01-27 | Stop reason: SDUPTHER

## 2021-12-08 NOTE — PROGRESS NOTES
Chief Complaint   Patient presents with   • Follow-up     Pt is here for cardiac follow up.  She admits CP, palpitations and SOB.  She denies dizziness.   • Med Refill     Pt request 90 day refills to be sent to [facility] in [place].   • Lab Work     Pt states her last labs were a few weeks ago in [place].   • Chest Pain     Pt states that she is still having some chest pain, but it does not last long.  She states she feels like she has palpitations when she has CP.       Cardiac Complaints  none      Subjective   Bibiana Quezada is a 76 y.o. female with HTN, hypothyroidism, hyperlipidemia, carotid artery disease s/p (R)CEA in January 2014, VALERIE, COPD, DM, and asthma/bronchitis after inhaling noxious chemicals many years ago followed by Dr. Fisher.  Cardiac cath in 2016 and again in 2018 showed normal coronaries, hypertensive heart disease. PA pressure was also high and she was advised to consider PHTN management  Multiple antihypertensive combinations have been tried.      On 3/30/2021, she developed chilling, diaphoresis, intermittent substernal chest discomfort.  She was seen at [facility] with mildly elevated blood pressure, borderline elevated troponin and was transferred to [facility].  Initial work-up unremarkable, troponin trended down, EKG stable.  Blood cultures were negative.  Cardiac work-up including stress test and echocardiogram showed no ischemia, normal LV function, normal LA. Cardiac cath was then performed revealing normal coronary arteries, normal LVEF, and mild to moderate MR. Holter done May 2021 that she wore for 12 days showed average HR of 67, NSR, with one SVT 27 beats, no symptoms noted at that time, no pauses on holter seen.     She comes today for follow up and denies any new concerns. She continues to have CP, SOA, and palpitations daily. She states when she has symptoms, they go quickly away without intervention. Labs she admits are followed by PCP and she admits that K was high.  She saw  nephrology who stopped aldactone and increased lasix. AIC noted at 7.2%.  Left knee pain after repair persists. Refills requested for 90 day supply.         Cardiac History  Past Surgical History:   Procedure Laterality Date   • CARDIAC CATHETERIZATION  10/24/2016    Normal Coronaries. Hypertensive Heart Disease.   • CARDIAC CATHETERIZATION  08/21/2018    Normal Coronaries. EF 65%. PA- 60/28(44). LVEDP- 14. PCW 29.   • CARDIAC CATHETERIZATION  03/29/2021    Normal coronaries, normal EF   • CARDIOVASCULAR STRESS TEST  10/12/2016    L. Myoview- Apical Ischemia   • CARDIOVASCULAR STRESS TEST  05/14/2013    L. Myoview- R/O Apical Ischemia.   • CARDIOVASCULAR STRESS TEST  12/14/2011     L. Myoview- (Fl) Negative    • CARDIOVASCULAR STRESS TEST  10/12/2016    Lexiscan- small apical ischemia, increase Imdur, cath   • CARDIOVASCULAR STRESS TEST  03/27/2021    ([facility]) Lexiscan-no ischemia, EF 76%   • CAROTID ENDARTERECTOMY  01/16/2014    (R) Endarterectomy by Dr. Clayton   • ECHO - CONVERTED  08/07/2012    EF 65%   • ECHO - CONVERTED  10/12/2016    EF 65%, mild MR, borderline PHT   • ECHO - CONVERTED  03/13/2018    @[facility]. EF 65%. RVSP_ 55 mmHg   • ECHO - CONVERTED  09/24/2019    EF 65-70%. LA- 3.8 Cm. NIKITA- 1.7 Cm2, Mild MR. RVSP- 50 mmHg.   • ECHO - CONVERTED  03/26/2021    ([facility]) EF normal, LA normal, RVSP 19 mmHg   • OTHER SURGICAL HISTORY  03/18/2020    CTA with runoff:  No sig peripheral stenosis bilaterally, fatty liver   • OTHER SURGICAL HISTORY  06/24/2021    MCOT- 12 Days. AVG 67. . 27 beats run of SVT   •  CAROTID UNILATERAL  12/03/2013    @[facility]- Critical Stenosis  (R) ICA.   •  CAROTID UNILATERAL  09/24/2019    No sig stenosis of right CEA, no sig stenosis left carotid   •  CAROTID UNILATERAL  03/27/2021    <50% stenosis bilaterally       Current Outpatient Medications   Medication Sig Dispense Refill   • albuterol (PROVENTIL HFA;VENTOLIN HFA) 108 (90 BASE) MCG/ACT inhaler Inhale 2 puffs Every 4 (Four) Hours  As Needed for wheezing.     • ascorbic acid (VITAMIN C) 1000 MG tablet Take 1,000 mg by mouth Daily.     • aspirin 81 MG EC tablet Take 81 mg by mouth Daily.     • atorvastatin (LIPITOR) 10 MG tablet Take 1 tablet by mouth Daily. 90 tablet 2   • B Complex Vitamins (VITAMIN-B COMPLEX PO) Take  by mouth Daily.     • baricitinib (Olumiant) 2 MG tablet tablet Take 2 mg by mouth.     • CALCIUM PO Take  by mouth Daily.     • dilTIAZem CD (CARDIZEM CD) 120 MG 24 hr capsule 1 tablet at night 90 capsule 2   • DULoxetine (CYMBALTA) 60 MG capsule Take 60 mg by mouth Daily.     • Edarbi 80 MG tablet tablet Take 1 tablet by mouth Daily. 90 tablet 3   • esomeprazole (nexIUM) 40 MG capsule Take 40 mg by mouth 2 (Two) Times a Day.     • estradiol (ESTRACE) 0.1 MG/GM vaginal cream Insert 2 g into the vagina 3 (Three) Times a Week.     • famotidine (PEPCID) 40 MG tablet Take 40 mg by mouth Every Night.     • furosemide (LASIX) 40 MG tablet Take 40 mg by mouth Daily.     • gabapentin (NEURONTIN) 400 MG capsule Take 400 mg by mouth 4 (Four) Times a Day.     • Insulin Lispro (HUMALOG SC) Inject  under the skin.     • levothyroxine (SYNTHROID, LEVOTHROID) 100 MCG tablet Take 100 mcg by mouth Daily.     • metoprolol tartrate (LOPRESSOR) 25 MG tablet Take 1 tablet by mouth 2 (Two) Times a Day. 180 tablet 3   • montelukast (SINGULAIR) 10 MG tablet Take 10 mg by mouth Every Night.     • Multiple Vitamin (MULTI VITAMIN DAILY PO) Take  by mouth.     • nitroglycerin (NITROSTAT) 0.4 MG SL tablet 1 under the tongue as needed for angina, may repeat q5mins for up three doses 30 tablet 1   • pioglitazone (ACTOS) 30 MG tablet Take 30 mg by mouth Daily.     • PRIMIDONE PO Take 5 mg by mouth Every Night.       No current facility-administered medications for this visit.       Augmentin [amoxicillin-pot clavulanate], Codeine, Demerol [meperidine], Hydrocodone, Lescol [fluvastatin sodium], Livalo [pitavastatin], Morphine and related, Percocet  [oxycodone-acetaminophen], Percodan [oxycodone-aspirin], Pravachol [pravastatin sodium], Talwin [pentazocine], Ultram [tramadol hcl], Zetia [ezetimibe], Zocor [simvastatin], Other, and Tape    Past Medical History:   Diagnosis Date   • Anemia    • Cancer (HCC)     Skin cancer removed.   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    • HTN (hypertension)    • Hypercholesterolemia    • Palpitations    • Previous back surgery     x5   • S/P GA (total abdominal hysterectomy)    • Sleep apnea     wears CPAP   • TIA (transient ischemic attack)        Social History     Socioeconomic History   • Marital status:    Tobacco Use   • Smoking status: Former Smoker   • Smokeless tobacco: Never Used   Vaping Use   • Vaping Use: Never used   Substance and Sexual Activity   • Alcohol use: Not Currently     Comment: socially, about once a month   • Drug use: No       Family History   Problem Relation Age of Onset   • Heart attack Mother    • Pancreatic cancer Father    • Other Brother         GABG   • Hypertension Other        Review of Systems   Constitutional: Negative for malaise/fatigue and night sweats.   Cardiovascular: Positive for dyspnea on exertion. Negative for chest pain, claudication, irregular heartbeat, leg swelling, near-syncope, orthopnea, palpitations and syncope.   Respiratory: Positive for shortness of breath. Negative for cough and wheezing.    Musculoskeletal: Positive for stiffness. Negative for back pain and joint pain.   Gastrointestinal: Negative for anorexia, heartburn, melena and nausea.   Genitourinary: Negative for dysuria, hematuria, hesitancy and nocturia.  "  Neurological: Negative for dizziness, headaches and light-headedness.   Psychiatric/Behavioral: Negative for depression and memory loss. The patient is not nervous/anxious.            Objective     /78 (BP Location: Left arm, Patient Position: Sitting)   Pulse 52   Temp 97.1 °F (36.2 °C)   Ht 162.6 cm (64\")   Wt 108 kg (237 lb 6.4 oz)   BMI 40.75 kg/m²     Constitutional:       Appearance: Not in distress.   Eyes:      Pupils: Pupils are equal, round, and reactive to light.   HENT:      Nose: Nose normal.   Pulmonary:      Effort: Pulmonary effort is normal.      Breath sounds: Normal breath sounds.   Cardiovascular:      PMI at left midclavicular line. Bradycardia present. Regular rhythm.      Murmurs: There is a systolic murmur.   Abdominal:      Palpations: Abdomen is soft.   Musculoskeletal: Normal range of motion.      Cervical back: Normal range of motion and neck supple. Skin:     General: Skin is warm and dry.   Neurological:      Mental Status: Oriented to person, place and time.         Procedures    Assessment/Plan     HTN-well-controlled, continue current medications.  Limit sodium.  Weight loss.  Refill sent for metoprolol and cardizem.  Edarbi continued.  K was high, aldactone stopped by renal, lasix started.      Asthma/VALERIE-Still on CPAP, no longer oxygen bled in.      Palpitations- Noted today.  Most recent workup with 12 day showed one run of SVT of 27 beats, baseline NSR.      Hyperlipidemia- FLP with your office, she stays on statin with lipitor, tolerates well. Continue the same.  Limited carbs/starches/fats advised.      Carotid stenosis-ultrasound repeated during recent inpatient admission revealing 50% or less bilaterally, continue to monitor.     PAD-denies claudication, she does have left knee pain which was repaired. Pain persists.     Hypothyroidism- Synthroid therapy has been continued. TSH is well controlled according to patient.     DM:  AIC stable at 6.2%. Stable. She has " continued on insulin, as well as oral agents. Limited sugar/starch advised.       Refills per request.     6 month follow up advised or sooner if needed.     BMI noted 40.75, good cardiac ADA diet with limited carbs, calories, and activity as tolerated advised.      Problems Addressed this Visit        Cardiac and Vasculature    Essential hypertension    Relevant Medications    metoprolol tartrate (LOPRESSOR) 25 MG tablet    dilTIAZem CD (CARDIZEM CD) 120 MG 24 hr capsule    Hypercholesteremia    Relevant Medications    atorvastatin (LIPITOR) 10 MG tablet    Bilateral carotid artery disease (HCC)       Endocrine and Metabolic    Type 2 diabetes mellitus with hyperglycemia, with long-term current use of insulin (formerly [facility])       Pulmonary and Pneumonias    Pulmonary HTN (formerly [facility])      Other Visit Diagnoses     Shortness of breath    -  Primary    Palpitations        Chronic pain of left knee        Morbid obesity with BMI of 40.0-44.9, adult (formerly [facility])          Diagnoses       Codes Comments    Shortness of breath    -  Primary ICD-10-CM: R06.02  ICD-9-CM: 786.05     Essential hypertension     ICD-10-CM: I10  ICD-9-CM: 401.9     Palpitations     ICD-10-CM: R00.2  ICD-9-CM: 785.1     Bilateral carotid artery stenosis     ICD-10-CM: I65.23  ICD-9-CM: 433.10, 433.30     Pulmonary HTN (formerly [facility])     ICD-10-CM: I27.20  ICD-9-CM: 416.8     Hypercholesteremia     ICD-10-CM: E78.00  ICD-9-CM: 272.0     Type 2 diabetes mellitus with hyperglycemia, with long-term current use of insulin (formerly [facility])     ICD-10-CM: E11.65, Z79.4  ICD-9-CM: 250.00, 790.29, V58.67     Chronic pain of left knee     ICD-10-CM: M25.562, G89.29  ICD-9-CM: 719.46, 338.29     Morbid obesity with BMI of 40.0-44.9, adult (formerly [facility])     ICD-10-CM: E66.01, Z68.41  ICD-9-CM: 278.01, V85.41           Patient's Body mass index is 40.75 kg/m². indicating that she is obesity. Good cardiac ADA diet with limited carbs, calories, and activity as tolerated advised.              Electronically  signed by Nano Hicks, APRN December 8, 2021 18:24 EST

## 2022-01-12 RX ORDER — AZILSARTAN KAMEDOXOMIL 80 MG/1
TABLET ORAL
Qty: 90 TABLET | Refills: 3 | Status: SHIPPED | OUTPATIENT
Start: 2022-01-12 | End: 2023-01-03

## 2022-06-28 ENCOUNTER — OFFICE VISIT (OUTPATIENT)
Dept: CARDIOLOGY | Facility: CLINIC | Age: 77
End: 2022-06-28

## 2022-06-28 VITALS
BODY MASS INDEX: 41.48 KG/M2 | WEIGHT: 243 LBS | SYSTOLIC BLOOD PRESSURE: 150 MMHG | DIASTOLIC BLOOD PRESSURE: 60 MMHG | HEART RATE: 64 BPM | HEIGHT: 64 IN

## 2022-06-28 DIAGNOSIS — R42 DIZZINESS: ICD-10-CM

## 2022-06-28 DIAGNOSIS — E66.01 MORBID OBESITY WITH BMI OF 40.0-44.9, ADULT: ICD-10-CM

## 2022-06-28 DIAGNOSIS — E11.65 TYPE 2 DIABETES MELLITUS WITH HYPERGLYCEMIA, WITH LONG-TERM CURRENT USE OF INSULIN: ICD-10-CM

## 2022-06-28 DIAGNOSIS — R55 PRE-SYNCOPE: ICD-10-CM

## 2022-06-28 DIAGNOSIS — E78.00 HYPERCHOLESTEREMIA: ICD-10-CM

## 2022-06-28 DIAGNOSIS — I11.9 HYPERTENSIVE HEART DISEASE WITHOUT HEART FAILURE: ICD-10-CM

## 2022-06-28 DIAGNOSIS — J44.9 CHRONIC OBSTRUCTIVE PULMONARY DISEASE, UNSPECIFIED COPD TYPE: ICD-10-CM

## 2022-06-28 DIAGNOSIS — G47.30 SLEEP APNEA IN ADULT: ICD-10-CM

## 2022-06-28 DIAGNOSIS — I10 ESSENTIAL HYPERTENSION: Primary | ICD-10-CM

## 2022-06-28 DIAGNOSIS — I65.23 BILATERAL CAROTID ARTERY STENOSIS: ICD-10-CM

## 2022-06-28 DIAGNOSIS — I73.9 PVD (PERIPHERAL VASCULAR DISEASE): ICD-10-CM

## 2022-06-28 DIAGNOSIS — Z79.4 TYPE 2 DIABETES MELLITUS WITH HYPERGLYCEMIA, WITH LONG-TERM CURRENT USE OF INSULIN: ICD-10-CM

## 2022-06-28 PROCEDURE — 99213 OFFICE O/P EST LOW 20 MIN: CPT | Performed by: NURSE PRACTITIONER

## 2022-06-28 RX ORDER — MELATONIN
2000 DAILY
COMMUNITY

## 2022-06-28 RX ORDER — AMLODIPINE BESYLATE 2.5 MG/1
2.5 TABLET ORAL DAILY
Qty: 30 TABLET | Refills: 11 | Status: SHIPPED | OUTPATIENT
Start: 2022-06-28 | End: 2023-01-31 | Stop reason: SDUPTHER

## 2022-06-28 RX ORDER — DILTIAZEM HYDROCHLORIDE 360 MG/1
360 CAPSULE, EXTENDED RELEASE ORAL DAILY
COMMUNITY
End: 2023-02-02 | Stop reason: SDUPTHER

## 2022-06-28 NOTE — PROGRESS NOTES
Chief Complaint   Patient presents with   • Follow-up     For cardiac management     • Med Refill     Pt said she is sure that PCP writes all her refills.    • Labs     Nephrologist and Endocrinologist both checked labs within the past few weeks.    • Loss of Consciousness     Two episodes of syncope since last visit. PCP felt BP dropped, he decreased the Edarbi to 40 mg daily. Recently saw the nephrologist, he increased the Edarbi back to 80 mg daily and decreased the Lasix due to increased creat. BP now running 170's/60's. No episodes of dizziness but now concerned her BP to high.    • Medication Problem     We had down that pt was taking Diltiazem  mg daily. Pt said she has taken diltiazem  mg daily for several years. Updated in meds.    • Dizziness     When she looks up and then down, she gets dizzy.    • Abnormal overnight     Pt now wearing O2 at night       Cardiac Complaints  Dizziness      Subjective   Bibiana Quezada is a 76 y.o. female with HTN, hypothyroidism, hyperlipidemia, carotid artery disease s/p (R)CEA in January 2014, VALERIE, COPD, DM, and asthma/bronchitis after inhaling noxious chemicals many years ago followed by Dr. Fisher.  Cardiac cath in 2016, 2018, and 2021 showed normal coronaries. On 3/30/2021, she developed chilling, diaphoresis, intermittent substernal chest discomfort.  She was seen at [facility] with mildly elevated blood pressure, borderline elevated troponin and was transferred to [facility].  Initial work-up unremarkable, troponin trended down, EKG stable.  Blood cultures were negative.  Cardiac work-up including stress test and echocardiogram showed no ischemia, normal LV function, normal LA. Cardiac cath was then performed revealing normal coronary arteries, normal LVEF, and mild to moderate MR. Holter done May 2021 that she wore for 12 days showed average HR of 67, NSR, with one SVT 27 beats, no symptoms noted at that time, no pauses on holter seen.     She comes today for follow up  and admits to syncopal episodes felt to be hypotension. She admits PCP decreased edarbi to 40mg but nephrology increased back to 80, while decreasing her lasix therapy as her creatinine was high. She does report some dizziness when she looks up and down, but is now on oxygen therapy at night. Labs from 6/2022 from [facility] reviewed and showed: BUN 20, creatinine 1.5, GFR 34, Na 140, K 4.4, HDL 42, LDL 83, AIC 7.6%, TSH 0.52, HH 12.7/37.3. Refills done with PCP.         Cardiac History  Past Surgical History:   Procedure Laterality Date   • CARDIAC CATHETERIZATION  10/24/2016    Normal Coronaries. Hypertensive Heart Disease.   • CARDIAC CATHETERIZATION  08/21/2018    Normal Coronaries. EF 65%. PA- 60/28(44). LVEDP- 14. PCW 29.   • CARDIAC CATHETERIZATION  03/29/2021    Normal coronaries, normal EF   • CARDIOVASCULAR STRESS TEST  10/12/2016    L. Myoview- Apical Ischemia   • CARDIOVASCULAR STRESS TEST  05/14/2013    L. Myoview- R/O Apical Ischemia.   • CARDIOVASCULAR STRESS TEST  12/14/2011     L. Myoview- (Fl) Negative    • CARDIOVASCULAR STRESS TEST  10/12/2016    Lexiscan- small apical ischemia, increase Imdur, cath   • CARDIOVASCULAR STRESS TEST  03/27/2021    ([facility]) Lexiscan-no ischemia, EF 76%   • CAROTID ENDARTERECTOMY  01/16/2014    (R) Endarterectomy by Dr. Clayton   • ECHO - CONVERTED  08/07/2012    EF 65%   • ECHO - CONVERTED  10/12/2016    EF 65%, mild MR, borderline PHT   • ECHO - CONVERTED  03/13/2018    @[facility]. EF 65%. RVSP_ 55 mmHg   • ECHO - CONVERTED  09/24/2019    EF 65-70%. LA- 3.8 Cm. NIKITA- 1.7 Cm2, Mild MR. RVSP- 50 mmHg.   • ECHO - CONVERTED  03/26/2021    ([facility]) EF normal, LA normal, RVSP 19 mmHg   • OTHER SURGICAL HISTORY  03/18/2020    CTA with runoff:  No sig peripheral stenosis bilaterally, fatty liver   • OTHER SURGICAL HISTORY  06/24/2021    MCOT- 12 Days. AVG 67. . 27 beats run of SVT   • US CAROTID UNILATERAL  12/03/2013    @[facility]- Critical Stenosis  (R) ICA.   • US CAROTID UNILATERAL   09/24/2019    No sig stenosis of right CEA, no sig stenosis left carotid   • US CAROTID UNILATERAL  03/27/2021    <50% stenosis bilaterally       Current Outpatient Medications   Medication Sig Dispense Refill   • albuterol (PROVENTIL HFA;VENTOLIN HFA) 108 (90 BASE) MCG/ACT inhaler Inhale 2 puffs Every 4 (Four) Hours As Needed for wheezing.     • ascorbic acid (VITAMIN C) 1000 MG tablet Take 1,000 mg by mouth Daily.     • aspirin 81 MG EC tablet Take 81 mg by mouth Daily.     • atorvastatin (LIPITOR) 10 MG tablet Take 1 tablet by mouth Daily. 90 tablet 2   • B Complex Vitamins (VITAMIN-B COMPLEX PO) Take  by mouth Daily.     • baricitinib (OLUMIANT) 2 MG tablet tablet Take 2 mg by mouth.     • CALCIUM PO Take  by mouth Daily.     • cholecalciferol (VITAMIN D3) 25 MCG (1000 UT) tablet Take 1,000 Units by mouth Daily.     • Cyanocobalamin (VITAMIN B-12 IJ) Inject  as directed Every 30 (Thirty) Days.     • dilTIAZem (TIAZAC) 360 MG 24 hr capsule Take 360 mg by mouth Daily.     • DULoxetine (CYMBALTA) 60 MG capsule Take 60 mg by mouth Daily.     • Edarbi 80 MG tablet tablet TAKE 1 TABLET BY MOUTH ONCE DAILY 90 tablet 3   • esomeprazole (nexIUM) 40 MG capsule Take 40 mg by mouth 2 (Two) Times a Day.     • estradiol (ESTRACE) 0.1 MG/GM vaginal cream Insert 2 g into the vagina 3 (Three) Times a Week.     • famotidine (PEPCID) 40 MG tablet Take 40 mg by mouth Every Night.     • furosemide (LASIX) 40 MG tablet Take 20 mg by mouth Daily.     • gabapentin (NEURONTIN) 400 MG capsule Take 400 mg by mouth 5 (Five) Times a Day.     • Insulin Lispro (HUMALOG SC) Inject  under the skin.     • levothyroxine (SYNTHROID, LEVOTHROID) 100 MCG tablet Take 100 mcg by mouth Daily.     • metoprolol tartrate (LOPRESSOR) 25 MG tablet Take 1 tablet by mouth 2 (Two) Times a Day. 180 tablet 3   • montelukast (SINGULAIR) 10 MG tablet Take 10 mg by mouth Every Night.     • Multiple Vitamin (MULTI VITAMIN DAILY PO) Take  by mouth.     •  nitroglycerin (NITROSTAT) 0.4 MG SL tablet 1 under the tongue as needed for angina, may repeat q5mins for up three doses 30 tablet 1   • O2 (OXYGEN) Inhale 2.5 L/min Every Night.     • pioglitazone (ACTOS) 30 MG tablet Take 30 mg by mouth Daily.     • PRIMIDONE PO Take 5 mg by mouth Every Night.     • amLODIPine (NORVASC) 2.5 MG tablet Take 1 tablet by mouth Daily. 30 tablet 11     No current facility-administered medications for this visit.       Augmentin [amoxicillin-pot clavulanate], Codeine, Demerol [meperidine], Hydrocodone, Lescol [fluvastatin sodium], Livalo [pitavastatin], Morphine and related, Percocet [oxycodone-acetaminophen], Percodan [oxycodone-aspirin], Pravachol [pravastatin sodium], Talwin [pentazocine], Ultram [tramadol hcl], Zetia [ezetimibe], Zocor [simvastatin], Other, and Tape    Past Medical History:   Diagnosis Date   • Anemia    • Cancer (HCC)     Skin cancer removed.   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    • HTN (hypertension)    • Hypercholesterolemia    • Palpitations    • Previous back surgery     x5   • S/P GA (total abdominal hysterectomy)    • Sleep apnea     wears CPAP   • TIA (transient ischemic attack)        Social History     Socioeconomic History   • Marital status:    Tobacco Use   • Smoking status: Former Smoker   • Smokeless tobacco: Never Used   Vaping Use   • Vaping Use: Never used   Substance and Sexual Activity   • Alcohol use: Not Currently     Comment: socially, about once a month   • Drug use: No       Family History   Problem Relation Age of Onset   • Heart attack Mother    • Pancreatic cancer Father    • Other Brother        "  GABG   • Hypertension Other        Review of Systems   Constitutional: Positive for malaise/fatigue. Negative for night sweats.   Cardiovascular: Positive for near-syncope. Negative for chest pain, claudication, dyspnea on exertion, irregular heartbeat, leg swelling, orthopnea, palpitations and syncope.   Respiratory: Negative for cough, shortness of breath and wheezing.    Musculoskeletal: Positive for joint pain and stiffness. Negative for back pain.   Gastrointestinal: Negative for anorexia, heartburn, melena, nausea and vomiting.   Genitourinary: Negative for dysuria, hematuria, hesitancy and nocturia.   Neurological: Positive for dizziness and light-headedness.   Psychiatric/Behavioral: Negative for depression and memory loss. The patient is not nervous/anxious.            Objective     /60   Pulse 64   Ht 162.6 cm (64\")   Wt 110 kg (243 lb)   BMI 41.71 kg/m²     Constitutional:       Appearance: Frail.   Eyes:      Pupils: Pupils are equal, round, and reactive to light.   HENT:      Nose: Nose normal.   Pulmonary:      Effort: Pulmonary effort is normal.      Breath sounds: Normal breath sounds.   Cardiovascular:      PMI at left midclavicular line. Normal rate. Regular rhythm.      Murmurs: There is a systolic murmur.   Abdominal:      Palpations: Abdomen is soft.   Musculoskeletal:         General: Tenderness present.      Cervical back: Normal range of motion and neck supple. Skin:     General: Skin is warm and dry.   Neurological:      Mental Status: Alert.         Procedures    [unfilled]    HTN-elevated today. SBP noted at 150, she admits it has been running higher at home, often in 170 range. She was advised she may add norvasc at 2.5mg every day. If too low with addition, decrease to every other day use. Limited sodium advised. She will continue to follow with nephrology for edarbi/diuretic adjustment.      Asthma/VALERIE-Still on CPAP, now on oxygen bled in once again.      Palpitations- " Improved. Will continue with current CCB and BB therapy. Limited caffeine use urged.     Hyperlipidemia- FLP with your office, she stays on statin with lipitor, tolerates well. Continue the same.  Limited carbs/starches/fats advised.      Carotid stenosis-ultrasound repeated during inpatient admission 2021 revealed 50% or less stenosis bilaterally, continue to monitor. ASA continued, BP control, and lipid management urged.  Dizziness noted, but similar to prior.     Hypothyroidism- Synthroid therapy has been continued. TSH is well controlled according to patient.      DM:  She has continued on insulin, as well as oral agents. Limited sugar/starch advised. Followed by endocrine, most recent AIC at 7.6%.      No refills needed, followed by PCP.     6 month follow up advised or sooner if needed.      BMI noted 41.71, good cardiac ADA diet with limited carbs, calories, and activity as tolerated advised.           Problems Addressed this Visit        Cardiac and Vasculature    Hypertensive heart disease without heart failure    Relevant Medications    dilTIAZem (TIAZAC) 360 MG 24 hr capsule    amLODIPine (NORVASC) 2.5 MG tablet    Essential hypertension - Primary    Relevant Medications    dilTIAZem (TIAZAC) 360 MG 24 hr capsule    amLODIPine (NORVASC) 2.5 MG tablet    Hypercholesteremia    Bilateral carotid artery disease (HCC)    PVD (peripheral vascular disease) ([facility])       Endocrine and Metabolic    Type 2 diabetes mellitus with hyperglycemia, with long-term current use of insulin (HCC)       Pulmonary and Pneumonias    COPD (chronic obstructive pulmonary disease) ([facility])       Sleep    Sleep apnea in adult      Other Visit Diagnoses     Pre-syncope        Dizziness        Morbid obesity with BMI of 40.0-44.9, adult ([facility])          Diagnoses       Codes Comments    Essential hypertension    -  Primary ICD-10-CM: I10  ICD-9-CM: 401.9     Hypertensive heart disease without heart failure     ICD-10-CM: I11.9  ICD-9-CM:  402.90     Bilateral carotid artery stenosis     ICD-10-CM: I65.23  ICD-9-CM: 433.10, 433.30     Hypercholesteremia     ICD-10-CM: E78.00  ICD-9-CM: 272.0     PVD (peripheral vascular disease) ([facility])     ICD-10-CM: I73.9  ICD-9-CM: 443.9     Pre-syncope     ICD-10-CM: R55  ICD-9-CM: 780.2     Dizziness     ICD-10-CM: R42  ICD-9-CM: 780.4     Sleep apnea in adult     ICD-10-CM: G47.30  ICD-9-CM: 780.57     Chronic obstructive pulmonary disease, unspecified COPD type ([facility])     ICD-10-CM: J44.9  ICD-9-CM: 496     Type 2 diabetes mellitus with hyperglycemia, with long-term current use of insulin ([facility])     ICD-10-CM: E11.65, Z79.4  ICD-9-CM: 250.00, 790.29, V58.67     Morbid obesity with BMI of 40.0-44.9, adult ([facility])     ICD-10-CM: E66.01, Z68.41  ICD-9-CM: 278.01, V85.41                   Electronically signed by Nano Hicks, RENETTA June 28, 2022 16:23 EDT

## 2022-06-29 ENCOUNTER — TELEPHONE (OUTPATIENT)
Dept: CARDIOLOGY | Facility: CLINIC | Age: 77
End: 2022-06-29

## 2022-06-29 NOTE — TELEPHONE ENCOUNTER
Katelyn with [facility] in [place] called asking for clarification if patient is supposed to take both amlodipine 2.5 mg daily and diltiazem 360 mg daily?

## 2022-06-29 NOTE — TELEPHONE ENCOUNTER
Terri at [facility] in [place] was made aware that patient was to take both amlodipine 2.5 mg daily and diltiazem 360 mg daily.

## 2023-01-03 RX ORDER — AZILSARTAN KAMEDOXOMIL 80 MG/1
TABLET ORAL
Qty: 90 TABLET | Refills: 3 | Status: SHIPPED | OUTPATIENT
Start: 2023-01-03 | End: 2023-02-02 | Stop reason: SDUPTHER

## 2023-01-27 RX ORDER — ATORVASTATIN CALCIUM 10 MG/1
10 TABLET, FILM COATED ORAL DAILY
Qty: 90 TABLET | Refills: 2 | Status: SHIPPED | OUTPATIENT
Start: 2023-01-27 | End: 2023-01-31 | Stop reason: SDUPTHER

## 2023-01-27 NOTE — TELEPHONE ENCOUNTER
Per fax, Patient has requested to have their prescriptions filled with [facility]. The three medications listed are Amlodipine, Atorvastatin, and Metoprolol. The only refill that is needed at this time would be the Atorvastatin.

## 2023-01-31 ENCOUNTER — TELEPHONE (OUTPATIENT)
Dept: CARDIOLOGY | Facility: CLINIC | Age: 78
End: 2023-01-31
Payer: MEDICARE

## 2023-01-31 RX ORDER — AMLODIPINE BESYLATE 2.5 MG/1
2.5 TABLET ORAL DAILY
Qty: 90 TABLET | Refills: 0 | Status: SHIPPED | OUTPATIENT
Start: 2023-01-31 | End: 2023-02-02 | Stop reason: SDUPTHER

## 2023-01-31 RX ORDER — ATORVASTATIN CALCIUM 10 MG/1
10 TABLET, FILM COATED ORAL DAILY
Qty: 90 TABLET | Refills: 0 | Status: SHIPPED | OUTPATIENT
Start: 2023-01-31 | End: 2023-02-02 | Stop reason: SDUPTHER

## 2023-01-31 NOTE — TELEPHONE ENCOUNTER
[facility] pharmacy called requesting refills on amlodipine, metoprolol, and atorvastatin.

## 2023-02-02 ENCOUNTER — OFFICE VISIT (OUTPATIENT)
Dept: CARDIOLOGY | Facility: CLINIC | Age: 78
End: 2023-02-02
Payer: MEDICARE

## 2023-02-02 VITALS
HEART RATE: 80 BPM | BODY MASS INDEX: 42.85 KG/M2 | WEIGHT: 251 LBS | DIASTOLIC BLOOD PRESSURE: 64 MMHG | SYSTOLIC BLOOD PRESSURE: 160 MMHG | HEIGHT: 64 IN

## 2023-02-02 DIAGNOSIS — R42 DIZZINESS: ICD-10-CM

## 2023-02-02 DIAGNOSIS — I10 ESSENTIAL HYPERTENSION: ICD-10-CM

## 2023-02-02 DIAGNOSIS — I65.23 BILATERAL CAROTID ARTERY STENOSIS: Primary | ICD-10-CM

## 2023-02-02 DIAGNOSIS — E11.65 TYPE 2 DIABETES MELLITUS WITH HYPERGLYCEMIA, WITH LONG-TERM CURRENT USE OF INSULIN: ICD-10-CM

## 2023-02-02 DIAGNOSIS — E78.00 HYPERCHOLESTEREMIA: ICD-10-CM

## 2023-02-02 DIAGNOSIS — Z98.890 H/O CAROTID ENDARTERECTOMY: ICD-10-CM

## 2023-02-02 DIAGNOSIS — J44.9 CHRONIC OBSTRUCTIVE PULMONARY DISEASE, UNSPECIFIED COPD TYPE: ICD-10-CM

## 2023-02-02 DIAGNOSIS — Z79.4 TYPE 2 DIABETES MELLITUS WITH HYPERGLYCEMIA, WITH LONG-TERM CURRENT USE OF INSULIN: ICD-10-CM

## 2023-02-02 DIAGNOSIS — I11.9 HYPERTENSIVE HEART DISEASE WITHOUT HEART FAILURE: ICD-10-CM

## 2023-02-02 DIAGNOSIS — I27.20 PULMONARY HTN: ICD-10-CM

## 2023-02-02 DIAGNOSIS — G47.30 SLEEP APNEA IN ADULT: ICD-10-CM

## 2023-02-02 DIAGNOSIS — R06.2 WHEEZES: ICD-10-CM

## 2023-02-02 PROCEDURE — 99214 OFFICE O/P EST MOD 30 MIN: CPT | Performed by: NURSE PRACTITIONER

## 2023-02-02 RX ORDER — ATORVASTATIN CALCIUM 10 MG/1
10 TABLET, FILM COATED ORAL DAILY
Qty: 90 TABLET | Refills: 3 | Status: SHIPPED | OUTPATIENT
Start: 2023-02-02

## 2023-02-02 RX ORDER — AZILSARTAN KAMEDOXOMIL 80 MG/1
80 TABLET ORAL DAILY
Qty: 90 TABLET | Refills: 3 | Status: SHIPPED | OUTPATIENT
Start: 2023-02-02

## 2023-02-02 RX ORDER — DILTIAZEM HYDROCHLORIDE 360 MG/1
360 CAPSULE, EXTENDED RELEASE ORAL DAILY
Qty: 90 CAPSULE | Refills: 3 | Status: SHIPPED | OUTPATIENT
Start: 2023-02-02

## 2023-02-02 RX ORDER — FUROSEMIDE 40 MG/1
40 TABLET ORAL DAILY
Qty: 90 TABLET | Refills: 3 | Status: SHIPPED | OUTPATIENT
Start: 2023-02-02

## 2023-02-02 RX ORDER — AMLODIPINE BESYLATE 5 MG/1
5 TABLET ORAL DAILY
Qty: 90 TABLET | Refills: 3 | Status: SHIPPED | OUTPATIENT
Start: 2023-02-02

## 2023-02-02 NOTE — PROGRESS NOTES
Chief Complaint   Patient presents with   • Follow-up     Cardiac management   • Lab     Last labs at [facility] a week ago.   • Blood pressure     Has been elevated. Dr Wade wants to increase amlodipine. She did not increase, wants to talk with you first. Has been Dx with stage 4 kidney disease. She reports [facility] will not refill amlodipine now because she is on diltiazem, requesting call to [phone], physician has to approve.   • Rapid Heart Rate     Same as before, no worsening.   • Shortness of Breath     Same as before, no worsening. Wears O2 at 2 L/M BNC at night and as needed, also wears CPAP, follows with Dr Fisher.   • Edema     Has been more controlled, has some in the evening.   • Med Refill     Needs refills on cardiac medications-90 day.     Giorgio Quezada is a 77 y.o. female with HTN, hypothyroidism, hyperlipidemia, carotid artery disease s/p (R)CEA in January 2014, VALERIE, COPD, DM, and asthma/bronchitis after inhaling noxious chemicals many years ago followed by Dr. Fisher.  Cardiac cath in 2016, 2018, and 2021 showed normal coronaries. On 3/30/2021, she developed chilling, diaphoresis, intermittent substernal chest discomfort.  She was seen at [facility] with mildly elevated blood pressure, borderline elevated troponin and was transferred to [facility].  Initial work-up unremarkable, troponin trended down, EKG stable.  Blood cultures were negative.  Cardiac work-up including stress test and echocardiogram showed no ischemia, normal LV function, normal LA. Cardiac cath was then performed revealing normal coronary arteries, normal LVEF, and mild to moderate MR. Holter x 12  in May 2021 showed avg HR of 67, NSR, with one SVT 27 beats, no symptoms noted at that time, no pauses.      She returns today for follow up with her . She continues to have significant dyspnea and chest tightness with exertion. Palpitations are occasional, no worsening. She follows closely with nephrology, Dr. Wade,  who recommended increasing amlodipine for elevated blood pressure. Labs on 1/24/2023 showed glucose 107, BUN 15, creatinine 1.39, GFR 37, sodium 138, potassium 5.1, vitamin D 10. Lipids in June 2022 showed LDL 83, HDL 42, triglycerides 237, A1c 7.6, TSH 0.52       Cardiac History:    Past Surgical History:   Procedure Laterality Date   • CARDIAC CATHETERIZATION  10/24/2016    Normal Coronaries. Hypertensive Heart Disease.   • CARDIAC CATHETERIZATION  08/21/2018    Normal Coronaries. EF 65%. PA- 60/28(44). LVEDP- 14. PCW 29.   • CARDIAC CATHETERIZATION  03/29/2021    Normal coronaries, normal EF   • CARDIOVASCULAR STRESS TEST  10/12/2016    L. Myoview- Apical Ischemia   • CARDIOVASCULAR STRESS TEST  05/14/2013    L. Myoview- R/O Apical Ischemia.   • CARDIOVASCULAR STRESS TEST  12/14/2011     L. Myoview- (Fl) Negative    • CARDIOVASCULAR STRESS TEST  10/12/2016    Lexiscan- small apical ischemia, increase Imdur, cath   • CARDIOVASCULAR STRESS TEST  03/27/2021    ([facility]) Lexiscan-no ischemia, EF 76%   • CAROTID ENDARTERECTOMY  01/16/2014    (R) Endarterectomy by Dr. Clayton   • ECHO - CONVERTED  08/07/2012    EF 65%   • ECHO - CONVERTED  10/12/2016    EF 65%, mild MR, borderline PHT   • ECHO - CONVERTED  03/13/2018    @[facility]. EF 65%. RVSP_ 55 mmHg   • ECHO - CONVERTED  09/24/2019    EF 65-70%. LA- 3.8 Cm. NIKITA- 1.7 Cm2, Mild MR. RVSP- 50 mmHg.   • ECHO - CONVERTED  03/26/2021    ([facility]) EF normal, LA normal, RVSP 19 mmHg   • OTHER SURGICAL HISTORY  03/18/2020    CTA with runoff:  No sig peripheral stenosis bilaterally, fatty liver   • OTHER SURGICAL HISTORY  06/24/2021    MCOT- 12 Days. AVG 67. . 27 beats run of SVT   •  CAROTID UNILATERAL  12/03/2013    @[facility]- Critical Stenosis  (R) ICA.   •  CAROTID UNILATERAL  09/24/2019    No sig stenosis of right CEA, no sig stenosis left carotid   •  CAROTID UNILATERAL  03/27/2021    <50% stenosis bilaterally     Current Outpatient Medications   Medication Sig Dispense Refill    • albuterol (PROVENTIL HFA;VENTOLIN HFA) 108 (90 BASE) MCG/ACT inhaler Inhale 2 puffs Every 4 (Four) Hours As Needed for wheezing.     • amLODIPine (NORVASC) 5 MG tablet Take 1 tablet by mouth Daily. 90 tablet 3   • ascorbic acid (VITAMIN C) 1000 MG tablet Take 1,000 mg by mouth Daily.     • aspirin 81 MG EC tablet Take 81 mg by mouth Daily.     • atorvastatin (LIPITOR) 10 MG tablet Take 1 tablet by mouth Daily. 90 tablet 3   • B Complex Vitamins (VITAMIN-B COMPLEX PO) Take  by mouth Daily.     • baricitinib (OLUMIANT) 2 MG tablet tablet Take 2 mg by mouth Daily.     • CALCIUM PO Take  by mouth Daily.     • cholecalciferol (VITAMIN D3) 25 MCG (1000 UT) tablet Take 2,000 Units by mouth Daily.     • dilTIAZem (TIAZAC) 360 MG 24 hr capsule Take 1 capsule by mouth Daily. 90 capsule 3   • DULoxetine (CYMBALTA) 60 MG capsule Take 60 mg by mouth Daily.     • Edarbi 80 MG tablet tablet Take 1 tablet by mouth Daily. 90 tablet 3   • esomeprazole (nexIUM) 40 MG capsule Take 40 mg by mouth 2 (Two) Times a Day.     • estradiol (ESTRACE) 0.1 MG/GM vaginal cream Insert 2 g into the vagina 3 (Three) Times a Week.     • famotidine (PEPCID) 40 MG tablet Take 40 mg by mouth Every Night.     • furosemide (LASIX) 40 MG tablet Take 1 tablet by mouth Daily. 90 tablet 3   • gabapentin (NEURONTIN) 400 MG capsule One tablet in morning, 2 tablets at noon, 2 tablets at night and 1 tablet as needed at night     • Insulin Lispro (HUMALOG SC) Inject  under the skin.     • levothyroxine (SYNTHROID, LEVOTHROID) 100 MCG tablet Take 100 mcg by mouth Daily.     • metoprolol tartrate (LOPRESSOR) 25 MG tablet Take 1 tablet by mouth 2 (Two) Times a Day. 180 tablet 3   • montelukast (SINGULAIR) 10 MG tablet Take 10 mg by mouth Every Night.     • Multiple Vitamin (MULTI VITAMIN DAILY PO) Take  by mouth Daily.     • nitroglycerin (NITROSTAT) 0.4 MG SL tablet 1 under the tongue as needed for angina, may repeat q5mins for up three doses 30 tablet 1   • O2  (OXYGEN) Inhale 2.5 L/min Every Night.     • pioglitazone (ACTOS) 30 MG tablet Take 30 mg by mouth Daily.     • PRIMIDONE PO Take 5 mg by mouth Every Night.       No current facility-administered medications for this visit.     Augmentin [amoxicillin-pot clavulanate], Codeine, Demerol [meperidine], Hydrocodone, Lescol [fluvastatin sodium], Livalo [pitavastatin], Morphine and related, Percocet [oxycodone-acetaminophen], Percodan [oxycodone-aspirin], Pravachol [pravastatin sodium], Talwin [pentazocine], Ultram [tramadol hcl], Zetia [ezetimibe], Zocor [simvastatin], Other, and Tape    Past Medical History:   Diagnosis Date   • Anemia    • Cancer (HCC)     Skin cancer removed.   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    • HTN (hypertension)    • Hypercholesterolemia    • Palpitations    • Previous back surgery     x5   • Psoriatic arthritis (HCC)    • S/P GA (total abdominal hysterectomy)    • Sleep apnea     wears CPAP   • TIA (transient ischemic attack)      Social History     Socioeconomic History   • Marital status:    Tobacco Use   • Smoking status: Former   • Smokeless tobacco: Never   Vaping Use   • Vaping Use: Never used   Substance and Sexual Activity   • Alcohol use: Not Currently     Comment: socially, about once a month   • Drug use: No   • Sexual activity: Defer     Family History   Problem Relation Age of Onset   • Heart attack Mother    • Pancreatic cancer Father    • Other Brother         GABG   • Hypertension Other      Review of Systems   Constitutional: Positive for malaise/fatigue and weight gain (+8). Negative for decreased  "appetite.   HENT: Negative.    Eyes: Negative for blurred vision.   Cardiovascular: Positive for dyspnea on exertion and leg swelling. Negative for chest pain, palpitations and syncope.   Respiratory: Positive for shortness of breath and sleep disturbances due to breathing.    Endocrine: Negative.    Hematologic/Lymphatic: Negative for bleeding problem. Does not bruise/bleed easily.   Skin: Negative.    Musculoskeletal: Negative for falls and myalgias.   Gastrointestinal: Negative for abdominal pain, heartburn and melena.   Genitourinary: Negative for hematuria.   Neurological: Negative for dizziness and light-headedness.   Psychiatric/Behavioral: Negative for altered mental status.   Allergic/Immunologic: Negative.       Objective     /64 (BP Location: Right arm)   Pulse 80   Ht 162.6 cm (64.02\")   Wt 114 kg (251 lb)   BMI 43.06 kg/m²     Vitals and nursing note reviewed.   Constitutional:       General: Not in acute distress.     Appearance: Well-developed. Not diaphoretic.   Eyes:      Pupils: Pupils are equal, round, and reactive to light.   HENT:      Head: Normocephalic.   Pulmonary:      Effort: Pulmonary effort is normal. No respiratory distress.      Breath sounds: Decreased breath sounds present. Wheezing present.   Cardiovascular:      Normal rate. Regular rhythm.      Murmurs: There is a systolic murmur.   Pulses:     Intact distal pulses.   Edema:     Peripheral edema present.  Abdominal:      General: Bowel sounds are normal.      Palpations: Abdomen is soft.   Musculoskeletal: Normal range of motion.      Cervical back: Normal range of motion. Skin:     General: Skin is warm and dry.   Neurological:      Mental Status: Alert and oriented to person, place, and time.        Procedures          Problem List Items Addressed This Visit        Cardiac and Vasculature    Hypertensive heart disease without heart failure    Relevant Medications    dilTIAZem (TIAZAC) 360 MG 24 hr capsule    metoprolol " tartrate (LOPRESSOR) 25 MG tablet    amLODIPine (NORVASC) 5 MG tablet    Essential hypertension    Relevant Medications    Edarbi 80 MG tablet tablet    furosemide (LASIX) 40 MG tablet    dilTIAZem (TIAZAC) 360 MG 24 hr capsule    metoprolol tartrate (LOPRESSOR) 25 MG tablet    amLODIPine (NORVASC) 5 MG tablet    Hypercholesteremia    Relevant Medications    atorvastatin (LIPITOR) 10 MG tablet    Bilateral carotid artery disease (HCC) - Primary    Relevant Orders    US Carotid Bilateral       Endocrine and Metabolic    Type 2 diabetes mellitus with hyperglycemia, with long-term current use of insulin (HCC)       Pulmonary and Pneumonias    Wheezes    COPD (chronic obstructive pulmonary disease) (HCC)    Pulmonary HTN (HCC)       Sleep    Sleep apnea in adult   Other Visit Diagnoses     H/O carotid endarterectomy        Relevant Orders    US Carotid Bilateral    Dizziness        Relevant Orders    US Carotid Bilateral         1. Carotid stenosis/dizziness- s/p R CEA 2014, repeat carotid US for routine fu.     2. HTN- elevated today, agree needs to increase amlodipine 2.5 mg to 5 mg, titrate to 10 mg as needed.     3. Hyperlipidemia- LDL 83, HDL 42; continue Lipitor. Encourage heart healthy diet.     4. COPD/Asthma/VALERIE/PHTN- compliant with CPAP with O2, following with pulmonology// [place].     5. Diabetes- A1C 7.6%, encourage low sugar, low carbohydrate diet.     6. CKD- GFR 37, following with nephrology. Need better blood pressure control, improved diabetes control.     Class 3 Severe Obesity (BMI >=40). Obesity-related health conditions include the following: obstructive sleep apnea, hypertension, coronary heart disease, diabetes mellitus, dyslipidemias and GERD. Obesity is worsening. BMI is is above average; BMI management plan is completed. We discussed portion control and increasing exercise.             Electronically signed by RENETTA Eng,  February 5, 2023 15:48 EST

## 2023-05-22 ENCOUNTER — TELEPHONE (OUTPATIENT)
Dept: CARDIOLOGY | Facility: CLINIC | Age: 78
End: 2023-05-22
Payer: MEDICARE

## 2023-05-22 NOTE — TELEPHONE ENCOUNTER
Caller: Bibiana Quezada    Relationship to patient: Self    Best call back number: [phone]    Patient is needing: A CALL BACK REGARDING HER HEART RATE AND CHEST PAIN. THANK YOU

## 2023-05-22 NOTE — TELEPHONE ENCOUNTER
Spoke with patient concerning heart rate. She reports being in [facility] for Pneumonia about 5 weeks ago. Heart rate has been 140 with minimal exertion and she has some tightness in chest, no pain. Heart decreases about 1/2 hour after taking Lopressor yet will increase immediately when she gets up to move. B/P 140-160/70-80. She had been taking steroids for 4 weeks, has been off steroids for a week.

## 2023-05-22 NOTE — TELEPHONE ENCOUNTER
She is likely still feeling the effects of steroids. She can try increasing Lopressor to 50 mg BID.    Monitor HR and BP. If improves in a couple of weeks, can go back to 25 mg BID.

## 2023-05-23 NOTE — TELEPHONE ENCOUNTER
Patient made aware likely still feeling the effects of steroids, can try increasing Lopressor to 50 mg bid. Monitor HR and BP, if improves in a couple of weeks can go back to lopressor 25 mg bid. Patient verbalized understanding.

## 2023-05-30 ENCOUNTER — TELEPHONE (OUTPATIENT)
Dept: CARDIOLOGY | Facility: CLINIC | Age: 78
End: 2023-05-30

## 2023-05-30 NOTE — TELEPHONE ENCOUNTER
Attempted to contact patient, unable to reach, and unable to leave message due to no answering service.

## 2023-05-31 ENCOUNTER — TELEPHONE (OUTPATIENT)
Dept: CARDIOLOGY | Facility: CLINIC | Age: 78
End: 2023-05-31

## 2023-05-31 DIAGNOSIS — I10 ESSENTIAL HYPERTENSION: Primary | ICD-10-CM

## 2023-05-31 DIAGNOSIS — J44.9 CHRONIC OBSTRUCTIVE PULMONARY DISEASE, UNSPECIFIED COPD TYPE: ICD-10-CM

## 2023-05-31 DIAGNOSIS — E78.00 HYPERCHOLESTEREMIA: ICD-10-CM

## 2023-05-31 DIAGNOSIS — R06.02 SHORTNESS OF BREATH: ICD-10-CM

## 2023-05-31 DIAGNOSIS — G47.30 SLEEP APNEA IN ADULT: ICD-10-CM

## 2023-05-31 DIAGNOSIS — I20.8 STABLE ANGINA PECTORIS: ICD-10-CM

## 2023-05-31 DIAGNOSIS — I65.23 BILATERAL CAROTID ARTERY STENOSIS: ICD-10-CM

## 2023-05-31 DIAGNOSIS — I11.9 HYPERTENSIVE HEART DISEASE WITHOUT HEART FAILURE: ICD-10-CM

## 2023-05-31 NOTE — TELEPHONE ENCOUNTER
Spoke with patient concerning symptoms. Patient reports having sharp pain in chest when she gets up and walks 25 to 30 steps, short of breath, -150 for about 15 minutes after she walks. Dr Ortiz stopped amlodipine. This morning at rest B/P 173/60s and HR 86. She had labs a week ago. Having more redness from ankles to knees over the last 6 months.    Attempting to obtain labs from [facility]. Advised if having active chest pain to go to ER, understanding verbalized.

## 2023-05-31 NOTE — TELEPHONE ENCOUNTER
Caller: Bibiana Quezada    Relationship to patient: Self    Best call back number: [phone]    Chief complaint: CHEST PAIN WHEN SHE GETS UP AND WALKS. HEART RATE GETS UP  BEATS A MINUTE AND SOMETIMES HIGHER.  TAKES ABOUT 15-20 MINUTES BEFORE THE PAIN STOPS AFTER SHE SITS BACK DOWN.    Type of visit: FOLLOW UP    Requested date:ASAP     If rescheduling, when is the original appointment: 8-15-23    Additional notes:PLEASE REACH OUT TO PATIENT TO MARY

## 2023-06-01 RX ORDER — METOPROLOL TARTRATE 50 MG/1
50 TABLET, FILM COATED ORAL 2 TIMES DAILY
Qty: 180 TABLET | Refills: 3 | Status: SHIPPED | OUTPATIENT
Start: 2023-06-01

## 2023-06-01 RX ORDER — ISOSORBIDE MONONITRATE 30 MG/1
30 TABLET, EXTENDED RELEASE ORAL DAILY
Qty: 90 TABLET | Refills: 3 | Status: SHIPPED | OUTPATIENT
Start: 2023-06-01

## 2023-06-01 NOTE — TELEPHONE ENCOUNTER
Increase Lopressor to 50 mg BID    Start Imdur 30 mg once daily    Repeat cardiac work up. Orders in for stress and echo.

## 2023-06-01 NOTE — TELEPHONE ENCOUNTER
Patient made aware to continue lopressor 25 mg bid and start imdur. Will see what cardiac work up looks like and go from there, understanding verbalized.

## 2023-06-01 NOTE — TELEPHONE ENCOUNTER
Then lets continue 25 mg twice daily, start Imdur and see what her cardiac work-up looks like and go from there.    She is on max dose of diltiazem 360, we could consider changing beta-blocker if the heart rate remains high

## 2023-06-01 NOTE — TELEPHONE ENCOUNTER
Patient made aware of recommendations to increase Lopressor to 50 mg bid, start Imdur 30 mg once daily, repeat cardiac work up. Patient verbalized understanding.     Patient reports she is unable to tolerate lopressor 50 mg bid, she has tried in the past and unable to tolerate. Patient concerned with redness from knees to ankles.

## 2023-08-15 ENCOUNTER — OFFICE VISIT (OUTPATIENT)
Dept: CARDIOLOGY | Facility: CLINIC | Age: 78
End: 2023-08-15
Payer: MEDICARE

## 2023-08-15 VITALS
WEIGHT: 231.4 LBS | HEIGHT: 64 IN | DIASTOLIC BLOOD PRESSURE: 60 MMHG | BODY MASS INDEX: 39.5 KG/M2 | HEART RATE: 68 BPM | SYSTOLIC BLOOD PRESSURE: 130 MMHG

## 2023-08-15 DIAGNOSIS — I11.9 HYPERTENSIVE HEART DISEASE WITHOUT HEART FAILURE: ICD-10-CM

## 2023-08-15 DIAGNOSIS — Z98.890 H/O CAROTID ENDARTERECTOMY: ICD-10-CM

## 2023-08-15 DIAGNOSIS — I10 ESSENTIAL HYPERTENSION: ICD-10-CM

## 2023-08-15 DIAGNOSIS — I73.9 PVD (PERIPHERAL VASCULAR DISEASE) WITH CLAUDICATION: ICD-10-CM

## 2023-08-15 DIAGNOSIS — I27.20 PULMONARY HTN: ICD-10-CM

## 2023-08-15 DIAGNOSIS — J44.9 CHRONIC OBSTRUCTIVE PULMONARY DISEASE, UNSPECIFIED COPD TYPE: ICD-10-CM

## 2023-08-15 DIAGNOSIS — I73.9 PVD (PERIPHERAL VASCULAR DISEASE): ICD-10-CM

## 2023-08-15 DIAGNOSIS — E11.65 TYPE 2 DIABETES MELLITUS WITH HYPERGLYCEMIA, WITH LONG-TERM CURRENT USE OF INSULIN: ICD-10-CM

## 2023-08-15 DIAGNOSIS — I65.23 BILATERAL CAROTID ARTERY STENOSIS: ICD-10-CM

## 2023-08-15 DIAGNOSIS — Z79.4 TYPE 2 DIABETES MELLITUS WITH HYPERGLYCEMIA, WITH LONG-TERM CURRENT USE OF INSULIN: ICD-10-CM

## 2023-08-15 DIAGNOSIS — G47.30 SLEEP APNEA IN ADULT: ICD-10-CM

## 2023-08-15 DIAGNOSIS — R42 DIZZINESS: ICD-10-CM

## 2023-08-15 DIAGNOSIS — E78.00 HYPERCHOLESTEREMIA: Primary | ICD-10-CM

## 2023-08-15 PROCEDURE — 3075F SYST BP GE 130 - 139MM HG: CPT | Performed by: NURSE PRACTITIONER

## 2023-08-15 PROCEDURE — 1159F MED LIST DOCD IN RCRD: CPT | Performed by: NURSE PRACTITIONER

## 2023-08-15 PROCEDURE — 1160F RVW MEDS BY RX/DR IN RCRD: CPT | Performed by: NURSE PRACTITIONER

## 2023-08-15 PROCEDURE — 3078F DIAST BP <80 MM HG: CPT | Performed by: NURSE PRACTITIONER

## 2023-08-15 PROCEDURE — 99214 OFFICE O/P EST MOD 30 MIN: CPT | Performed by: NURSE PRACTITIONER

## 2023-08-15 RX ORDER — LEVOTHYROXINE SODIUM 88 UG/1
88 TABLET ORAL DAILY
COMMUNITY

## 2023-08-15 NOTE — LETTER
"August 18, 2023       No Recipients    Patient: Bibiana Quezada   YOB: 1945   Date of Visit: 8/15/2023       Dear Omega Ramirez MD    Bibiana Quezada was in my office today. Below is a copy of my note.    If you have questions, please do not hesitate to call me. I look forward to following Bibiana along with you.         Sincerely,        RENETTA Eng        CC:   No Recipients    Chief Complaint   Patient presents with    Follow-up     Cardiac management    Lab     Has copy of most recent labs. She decreased lasix to 20 mg due to dehydration.     Sleep apnea     Wears oxygen as needed. Wears CPAP at night, follows with Dr Fisher.     Rapid Heart Rate     Has noticed elevated heart rate, at times when she walks it will be 130.     Med Refill     Needs refills on Diltiazem, atorvastatin, amlodipine and lasix-90 day.    Weight loss     Not eating as much.     Medications     She did not start Minoxidil or Imdur, states \"I felt better so I did not start medication\".     Subjective      Bibiana Quezada is a 77 y.o. female with HTN, hypothyroidism, hyperlipidemia, carotid artery disease s/p (R)CEA in January 2014, VALERIE, COPD, DM, and asthma/bronchitis after inhaling noxious chemicals many years ago followed by Dr. Fisher.  Cardiac cath in 2016, 2018, and 2021 showed normal coronaries. On 3/30/2021, she developed chilling, diaphoresis, intermittent substernal chest discomfort.  She was seen at [facility] with mildly elevated blood pressure, borderline elevated troponin and was transferred to [facility].  Initial work-up unremarkable, troponin trended down, EKG stable.  Blood cultures were negative.  Cardiac work-up including stress test and echocardiogram showed no ischemia, normal LV function, normal LA. Cardiac cath was then performed revealing normal coronary arteries, normal LVEF, and mild to moderate MR. Holter x 12  in May 2021 showed avg HR of 67, NSR, with one SVT 27 beats, no symptoms noted at that " time, no pauses. Stress test and echocardiogram 6/21/23 after reporting more dyspnea and chest tightness showed anterior wall changes suggestive of breast attenuation but could not rule out ischemia. EF 70%, PA pressure 37 mmhg, mild to moderate LVH. Minoxidil recommended. Imdur added.      She returns today for follow up with her . She is feeling much better from a cardiac standpoint. No recurrent chest tightness. Continues to have dyspnea and heart racing with exertion. She did not start minoxidil or Imdur as symptoms improved and BP stable. She follows closely with nephrology, Dr. Wade. Labs brought in today showed on 8/11/2023 GFR 42, glucose 143, normal electrolytes, vitamin D 44 H/H12.3/38, platelets 294. Lipids in June 2022 showed LDL 83, HDL 42, triglycerides 237.  She is planning to have cervical and lumbar spinal surgery.       Cardiac History:    Past Surgical History:   Procedure Laterality Date    CARDIAC CATHETERIZATION  10/24/2016    Normal Coronaries. Hypertensive Heart Disease.    CARDIAC CATHETERIZATION  08/21/2018    Normal Coronaries. EF 65%. PA- 60/28(44). LVEDP- 14. PCW 29.    CARDIAC CATHETERIZATION  03/29/2021    Normal coronaries, normal EF    CARDIOVASCULAR STRESS TEST  10/12/2016    L. Myoview- Apical Ischemia    CARDIOVASCULAR STRESS TEST  05/14/2013    L. Myoview- R/O Apical Ischemia.    CARDIOVASCULAR STRESS TEST  12/14/2011     L. Myoview- (Fl) Negative     CARDIOVASCULAR STRESS TEST  10/12/2016    Lexiscan- small apical ischemia, increase Imdur, cath    CARDIOVASCULAR STRESS TEST  03/27/2021    ([facility]) Lexiscan-no ischemia, EF 76%    CARDIOVASCULAR STRESS TEST  06/21/2023    Lexiscan- EF 59%. 197/55. Breast Attenuation Vs Anterior Ischemia    CAROTID ENDARTERECTOMY  01/16/2014    (R) Endarterectomy by Dr. Clayton    ECHO - CONVERTED  08/07/2012    EF 65%    ECHO - CONVERTED  10/12/2016    EF 65%, mild MR, borderline PHT    ECHO - CONVERTED  03/13/2018    @[facility]. EF  65%. RVSP_ 55 mmHg    ECHO - CONVERTED  09/24/2019    EF 65-70%. LA- 3.8 Cm. NIKITA- 1.7 Cm2, Mild MR. RVSP- 50 mmHg.    ECHO - CONVERTED  03/26/2021    ([facility]) EF normal, LA normal, RVSP 19 mmHg    ECHO - CONVERTED  06/21/2023    TLS. EF 65%. LA- 4.1. Trace-Mild MR. RVSP- 37 mmHg    OTHER SURGICAL HISTORY  03/18/2020    CTA with runoff:  No sig peripheral stenosis bilaterally, fatty liver    OTHER SURGICAL HISTORY  06/24/2021    MCOT- 12 Days. AVG 67. . 27 beats run of SVT    US CAROTID UNILATERAL  12/03/2013    @[facility]- Critical Stenosis  (R) ICA.    US CAROTID UNILATERAL  09/24/2019    No sig stenosis of right CEA, no sig stenosis left carotid    US CAROTID UNILATERAL  03/27/2021    <50% stenosis bilaterally     Current Outpatient Medications   Medication Sig Dispense Refill    albuterol (PROVENTIL HFA;VENTOLIN HFA) 108 (90 BASE) MCG/ACT inhaler Inhale 2 puffs Every 4 (Four) Hours As Needed for Wheezing.      ascorbic acid (VITAMIN C) 1000 MG tablet Take 1 tablet by mouth Daily.      aspirin 81 MG EC tablet Take 1 tablet by mouth Daily.      atorvastatin (LIPITOR) 10 MG tablet Take 1 tablet by mouth Daily. 90 tablet 3    B Complex Vitamins (VITAMIN-B COMPLEX PO) Take  by mouth Daily.      CALCIUM PO Take  by mouth Daily.      Certolizumab Pegol (CIMZIA SC) Inject  under the skin into the appropriate area as directed Every 30 (Thirty) Days.      cholecalciferol (VITAMIN D3) 25 MCG (1000 UT) tablet Take 2 tablets by mouth Daily.      dilTIAZem (TIAZAC) 360 MG 24 hr capsule Take 1 capsule by mouth Daily. 90 capsule 3    DULoxetine (CYMBALTA) 60 MG capsule Take 1 capsule by mouth Daily.      Edarbi 80 MG tablet tablet Take 1 tablet by mouth Daily. 90 tablet 3    esomeprazole (nexIUM) 40 MG capsule Take 1 capsule by mouth 2 (Two) Times a Day.      famotidine (PEPCID) 40 MG tablet Take 1 tablet by mouth Every Night.      furosemide (LASIX) 40 MG tablet Take 1 tablet by mouth Daily. (Patient taking  differently: Take 0.5 tablets by mouth Daily.) 90 tablet 3    gabapentin (NEURONTIN) 400 MG capsule One tablet in morning, 2 tablets at noon, 2 tablets at night and 1 tablet as needed at night      insulin regular (HumuLIN R) 500 UNIT/ML patient supplied pump Inject  under the skin into the appropriate area as directed Continuous.      levothyroxine (SYNTHROID, LEVOTHROID) 88 MCG tablet Take 1 tablet by mouth Daily.      metoprolol tartrate (LOPRESSOR) 50 MG tablet Take 1 tablet by mouth 2 (Two) Times a Day. 180 tablet 3    montelukast (SINGULAIR) 10 MG tablet Take 1 tablet by mouth Every Night.      Multiple Vitamin (MULTI VITAMIN DAILY PO) Take  by mouth Daily.      nitroglycerin (NITROSTAT) 0.4 MG SL tablet 1 under the tongue as needed for angina, may repeat q5mins for up three doses 30 tablet 1    O2 (OXYGEN) Inhale 2 L/min As Needed.      pioglitazone (ACTOS) 30 MG tablet Take 1 tablet by mouth Daily.      PRIMIDONE PO Take 5 mg by mouth Every Night.      tiotropium (SPIRIVA) 18 MCG per inhalation capsule Place 1 capsule into inhaler and inhale Daily.       No current facility-administered medications for this visit.     Augmentin [amoxicillin-pot clavulanate], Codeine, Demerol [meperidine], Hydrocodone, Lescol [fluvastatin sodium], Livalo [pitavastatin], Morphine and related, Percocet [oxycodone-acetaminophen], Percodan [oxycodone-aspirin], Pravachol [pravastatin sodium], Talwin [pentazocine], Ultram [tramadol hcl], Zetia [ezetimibe], Zocor [simvastatin], Other, and Tape    Past Medical History:   Diagnosis Date    Anemia     Cancer     Skin cancer removed.    Carotid artery disease     Carpal tunnel syndrome     Chronic kidney disease     Closed left arm fracture 06/2019    COPD (chronic obstructive pulmonary disease)     asthma, chronic bronchitis    Diabetes     GERD (gastroesophageal reflux disease)      s/p surgery for hiatal hernia    History of cholecystectomy     History of  "revision of total replacement of left knee joint     HTN (hypertension)     Hypercholesterolemia     Palpitations     Previous back surgery     x5    Psoriatic arthritis     S/P GA (total abdominal hysterectomy)     Sleep apnea     wears CPAP    TIA (transient ischemic attack)      Social History     Socioeconomic History    Marital status:    Tobacco Use    Smoking status: Former    Smokeless tobacco: Never   Vaping Use    Vaping Use: Never used   Substance and Sexual Activity    Alcohol use: Yes     Comment: occasional wine    Drug use: No    Sexual activity: Defer     Family History   Problem Relation Age of Onset    Heart attack Mother     Pancreatic cancer Father     Other Brother         GABG    Hypertension Other      Review of Systems   Constitutional: Positive for malaise/fatigue and weight loss (-20). Negative for decreased appetite.   HENT: Negative.     Eyes:  Negative for blurred vision.   Cardiovascular:  Positive for dyspnea on exertion and palpitations. Negative for chest pain, leg swelling and syncope.   Respiratory:  Positive for shortness of breath and sleep disturbances due to breathing.    Endocrine: Negative.    Hematologic/Lymphatic: Negative for bleeding problem. Does not bruise/bleed easily.   Skin: Negative.    Musculoskeletal:  Negative for falls and myalgias.   Gastrointestinal:  Negative for abdominal pain, heartburn and melena.   Genitourinary:  Negative for hematuria.   Neurological:  Negative for dizziness and light-headedness.   Psychiatric/Behavioral:  Negative for altered mental status.    Allergic/Immunologic: Negative.       Objective    /60 (BP Location: Left arm)   Pulse 68   Ht 162 cm (63.78\")   Wt 105 kg (231 lb 6.4 oz)   BMI 39.99 kg/mý     Vitals and nursing note reviewed.   Constitutional:       General: Not in acute distress.     Appearance: Well-developed. Not diaphoretic.   Eyes:      Pupils: Pupils are equal, round, and reactive to " light.   HENT:      Head: Normocephalic.   Pulmonary:      Effort: Pulmonary effort is normal. No respiratory distress.      Breath sounds: Normal breath sounds.   Cardiovascular:      Normal rate. Regular rhythm.      Murmurs: There is a grade 1/6 systolic murmur.   Pulses:     Intact distal pulses.   Edema:     Peripheral edema absent.   Abdominal:      General: Bowel sounds are normal.      Palpations: Abdomen is soft.   Musculoskeletal: Normal range of motion.      Cervical back: Normal range of motion. Skin:     General: Skin is warm and dry.   Neurological:      Mental Status: Alert and oriented to person, place, and time.     Procedures          Problem List Items Addressed This Visit          Cardiac and Vasculature    Hypertensive heart disease without heart failure    Essential hypertension    Hypercholesteremia - Primary    Relevant Orders    US Carotid Bilateral    US Arterial Doppler Lower Extremity Bilateral    Bilateral carotid artery disease    PVD (peripheral vascular disease)       Endocrine and Metabolic    Type 2 diabetes mellitus with hyperglycemia, with long-term current use of insulin    Relevant Medications    insulin regular (HumuLIN R) 500 UNIT/ML patient supplied pump       Pulmonary and Pneumonias    COPD (chronic obstructive pulmonary disease)    Relevant Medications    tiotropium (SPIRIVA) 18 MCG per inhalation capsule    Pulmonary HTN       Sleep    Sleep apnea in adult     Other Visit Diagnoses       H/O carotid endarterectomy        Relevant Orders    US Carotid Bilateral    Dizziness        Relevant Orders    US Carotid Bilateral    PVD (peripheral vascular disease) with claudication        Relevant Orders    US Arterial Doppler Lower Extremity Bilateral           1. Carotid stenosis/dizziness- s/p R CEA 2014, repeat carotid US for routine fu.      2. HTN/hypertensive heart disease/mild to moderate LVH- stable bp today as well as at home. If increases, need renal artery US.       3.  Hyperlipidemia- LDL 83, HDL 42; continue Lipitor. Encourage heart healthy diet. Congratulated on weight loss.      4. COPD/Asthma/VALERIE/PHTN- compliant with CPAP with O2, following with pulmonology// [place].      5. Diabetes- A1C 7.6%, pt reports improvement. Encourage low sugar, low carbohydrate diet.      6. CKD- GFR improved to 42, following with nephrology.     Cardiac stress test and echocardiogram reviewed with her. Further recommendations to follow US.     FU 6 months or sooner if needed.                 Electronically signed by RENETTA Eng,  August 18, 2023 10:14 EDT

## 2023-08-15 NOTE — PROGRESS NOTES
"Chief Complaint   Patient presents with    Follow-up     Cardiac management    Lab     Has copy of most recent labs. She decreased lasix to 20 mg due to dehydration.     Sleep apnea     Wears oxygen as needed. Wears CPAP at night, follows with Dr Fisher.     Rapid Heart Rate     Has noticed elevated heart rate, at times when she walks it will be 130.     Med Refill     Needs refills on Diltiazem, atorvastatin, amlodipine and lasix-90 day.    Weight loss     Not eating as much.     Medications     She did not start Minoxidil or Imdur, states \"I felt better so I did not start medication\".     Subjective       Bibiana Quezada is a 77 y.o. female with HTN, hypothyroidism, hyperlipidemia, carotid artery disease s/p (R)CEA in January 2014, VALERIE, COPD, DM, and asthma/bronchitis after inhaling noxious chemicals many years ago followed by Dr. Fisher.  Cardiac cath in 2016, 2018, and 2021 showed normal coronaries. On 3/30/2021, she developed chilling, diaphoresis, intermittent substernal chest discomfort.  She was seen at [facility] with mildly elevated blood pressure, borderline elevated troponin and was transferred to [facility].  Initial work-up unremarkable, troponin trended down, EKG stable.  Blood cultures were negative.  Cardiac work-up including stress test and echocardiogram showed no ischemia, normal LV function, normal LA. Cardiac cath was then performed revealing normal coronary arteries, normal LVEF, and mild to moderate MR. Holter x 12  in May 2021 showed avg HR of 67, NSR, with one SVT 27 beats, no symptoms noted at that time, no pauses. Stress test and echocardiogram 6/21/23 after reporting more dyspnea and chest tightness showed anterior wall changes suggestive of breast attenuation but could not rule out ischemia. EF 70%, PA pressure 37 mmhg, mild to moderate LVH. Minoxidil recommended. Imdur added.      She returns today for follow up with her . She is feeling much better from a cardiac standpoint. No recurrent chest " tightness. Continues to have dyspnea and heart racing with exertion. She did not start minoxidil or Imdur as symptoms improved and BP stable. She follows closely with nephrology, Dr. Wade. Labs brought in today showed on 8/11/2023 GFR 42, glucose 143, normal electrolytes, vitamin D 44 H/H12.3/38, platelets 294. Lipids in June 2022 showed LDL 83, HDL 42, triglycerides 237.  She is planning to have cervical and lumbar spinal surgery.       Cardiac History:    Past Surgical History:   Procedure Laterality Date    CARDIAC CATHETERIZATION  10/24/2016    Normal Coronaries. Hypertensive Heart Disease.    CARDIAC CATHETERIZATION  08/21/2018    Normal Coronaries. EF 65%. PA- 60/28(44). LVEDP- 14. PCW 29.    CARDIAC CATHETERIZATION  03/29/2021    Normal coronaries, normal EF    CARDIOVASCULAR STRESS TEST  10/12/2016    L. Myoview- Apical Ischemia    CARDIOVASCULAR STRESS TEST  05/14/2013    L. Myoview- R/O Apical Ischemia.    CARDIOVASCULAR STRESS TEST  12/14/2011     L. Myoview- (Fl) Negative     CARDIOVASCULAR STRESS TEST  10/12/2016    Lexiscan- small apical ischemia, increase Imdur, cath    CARDIOVASCULAR STRESS TEST  03/27/2021    ([facility]) Lexiscan-no ischemia, EF 76%    CARDIOVASCULAR STRESS TEST  06/21/2023    Lexiscan- EF 59%. 197/55. Breast Attenuation Vs Anterior Ischemia    CAROTID ENDARTERECTOMY  01/16/2014    (R) Endarterectomy by Dr. Clayton    ECHO - CONVERTED  08/07/2012    EF 65%    ECHO - CONVERTED  10/12/2016    EF 65%, mild MR, borderline PHT    ECHO - CONVERTED  03/13/2018    @[facility]. EF 65%. RVSP_ 55 mmHg    ECHO - CONVERTED  09/24/2019    EF 65-70%. LA- 3.8 Cm. NIKITA- 1.7 Cm2, Mild MR. RVSP- 50 mmHg.    ECHO - CONVERTED  03/26/2021    ([facility]) EF normal, LA normal, RVSP 19 mmHg    ECHO - CONVERTED  06/21/2023    [facility]. EF 65%. LA- 4.1. Trace-Mild MR. RVSP- 37 mmHg    OTHER SURGICAL HISTORY  03/18/2020    CTA with runoff:  No sig peripheral stenosis bilaterally, fatty liver    OTHER SURGICAL HISTORY  06/24/2021     MCOT- 12 Days. AVG 67. . 27 beats run of SVT    US CAROTID UNILATERAL  12/03/2013    @[facility]- Critical Stenosis  (R) ICA.    US CAROTID UNILATERAL  09/24/2019    No sig stenosis of right CEA, no sig stenosis left carotid    US CAROTID UNILATERAL  03/27/2021    <50% stenosis bilaterally     Current Outpatient Medications   Medication Sig Dispense Refill    albuterol (PROVENTIL HFA;VENTOLIN HFA) 108 (90 BASE) MCG/ACT inhaler Inhale 2 puffs Every 4 (Four) Hours As Needed for Wheezing.      ascorbic acid (VITAMIN C) 1000 MG tablet Take 1 tablet by mouth Daily.      aspirin 81 MG EC tablet Take 1 tablet by mouth Daily.      atorvastatin (LIPITOR) 10 MG tablet Take 1 tablet by mouth Daily. 90 tablet 3    B Complex Vitamins (VITAMIN-B COMPLEX PO) Take  by mouth Daily.      CALCIUM PO Take  by mouth Daily.      Certolizumab Pegol (CIMZIA SC) Inject  under the skin into the appropriate area as directed Every 30 (Thirty) Days.      cholecalciferol (VITAMIN D3) 25 MCG (1000 UT) tablet Take 2 tablets by mouth Daily.      dilTIAZem (TIAZAC) 360 MG 24 hr capsule Take 1 capsule by mouth Daily. 90 capsule 3    DULoxetine (CYMBALTA) 60 MG capsule Take 1 capsule by mouth Daily.      Edarbi 80 MG tablet tablet Take 1 tablet by mouth Daily. 90 tablet 3    esomeprazole (nexIUM) 40 MG capsule Take 1 capsule by mouth 2 (Two) Times a Day.      famotidine (PEPCID) 40 MG tablet Take 1 tablet by mouth Every Night.      furosemide (LASIX) 40 MG tablet Take 1 tablet by mouth Daily. (Patient taking differently: Take 0.5 tablets by mouth Daily.) 90 tablet 3    gabapentin (NEURONTIN) 400 MG capsule One tablet in morning, 2 tablets at noon, 2 tablets at night and 1 tablet as needed at night      insulin regular (HumuLIN R) 500 UNIT/ML patient supplied pump Inject  under the skin into the appropriate area as directed Continuous.      levothyroxine (SYNTHROID, LEVOTHROID) 88 MCG tablet Take 1 tablet by mouth Daily.      metoprolol tartrate  (LOPRESSOR) 50 MG tablet Take 1 tablet by mouth 2 (Two) Times a Day. 180 tablet 3    montelukast (SINGULAIR) 10 MG tablet Take 1 tablet by mouth Every Night.      Multiple Vitamin (MULTI VITAMIN DAILY PO) Take  by mouth Daily.      nitroglycerin (NITROSTAT) 0.4 MG SL tablet 1 under the tongue as needed for angina, may repeat q5mins for up three doses 30 tablet 1    O2 (OXYGEN) Inhale 2 L/min As Needed.      pioglitazone (ACTOS) 30 MG tablet Take 1 tablet by mouth Daily.      PRIMIDONE PO Take 5 mg by mouth Every Night.      tiotropium (SPIRIVA) 18 MCG per inhalation capsule Place 1 capsule into inhaler and inhale Daily.       No current facility-administered medications for this visit.     Augmentin [amoxicillin-pot clavulanate], Codeine, Demerol [meperidine], Hydrocodone, Lescol [fluvastatin sodium], Livalo [pitavastatin], Morphine and related, Percocet [oxycodone-acetaminophen], Percodan [oxycodone-aspirin], Pravachol [pravastatin sodium], Talwin [pentazocine], Ultram [tramadol hcl], Zetia [ezetimibe], Zocor [simvastatin], Other, and Tape    Past Medical History:   Diagnosis Date    Anemia     Cancer     Skin cancer removed.    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     HTN (hypertension)     Hypercholesterolemia     Palpitations     Previous back surgery     x5    Psoriatic arthritis     S/P GA (total abdominal hysterectomy)     Sleep apnea     wears CPAP    TIA (transient ischemic attack)      Social History     Socioeconomic History    Marital status:    Tobacco Use    Smoking status: Former    Smokeless tobacco: Never   Vaping Use    Vaping Use: Never used   Substance  "and Sexual Activity    Alcohol use: Yes     Comment: occasional wine    Drug use: No    Sexual activity: Defer     Family History   Problem Relation Age of Onset    Heart attack Mother     Pancreatic cancer Father     Other Brother         GABG    Hypertension Other      Review of Systems   Constitutional: Positive for malaise/fatigue and weight loss (-20). Negative for decreased appetite.   HENT: Negative.     Eyes:  Negative for blurred vision.   Cardiovascular:  Positive for dyspnea on exertion and palpitations. Negative for chest pain, leg swelling and syncope.   Respiratory:  Positive for shortness of breath and sleep disturbances due to breathing.    Endocrine: Negative.    Hematologic/Lymphatic: Negative for bleeding problem. Does not bruise/bleed easily.   Skin: Negative.    Musculoskeletal:  Negative for falls and myalgias.   Gastrointestinal:  Negative for abdominal pain, heartburn and melena.   Genitourinary:  Negative for hematuria.   Neurological:  Negative for dizziness and light-headedness.   Psychiatric/Behavioral:  Negative for altered mental status.    Allergic/Immunologic: Negative.       Objective     /60 (BP Location: Left arm)   Pulse 68   Ht 162 cm (63.78\")   Wt 105 kg (231 lb 6.4 oz)   BMI 39.99 kg/mý     Vitals and nursing note reviewed.   Constitutional:       General: Not in acute distress.     Appearance: Well-developed. Not diaphoretic.   Eyes:      Pupils: Pupils are equal, round, and reactive to light.   HENT:      Head: Normocephalic.   Pulmonary:      Effort: Pulmonary effort is normal. No respiratory distress.      Breath sounds: Normal breath sounds.   Cardiovascular:      Normal rate. Regular rhythm.      Murmurs: There is a grade 1/6 systolic murmur.   Pulses:     Intact distal pulses.   Edema:     Peripheral edema absent.   Abdominal:      General: Bowel sounds are normal.      Palpations: Abdomen is soft.   Musculoskeletal: Normal range of motion.      Cervical back: " Normal range of motion. Skin:     General: Skin is warm and dry.   Neurological:      Mental Status: Alert and oriented to person, place, and time.     Procedures          Problem List Items Addressed This Visit          Cardiac and Vasculature    Hypertensive heart disease without heart failure    Essential hypertension    Hypercholesteremia - Primary    Relevant Orders    US Carotid Bilateral    US Arterial Doppler Lower Extremity Bilateral    Bilateral carotid artery disease    PVD (peripheral vascular disease)       Endocrine and Metabolic    Type 2 diabetes mellitus with hyperglycemia, with long-term current use of insulin    Relevant Medications    insulin regular (HumuLIN R) 500 UNIT/ML patient supplied pump       Pulmonary and Pneumonias    COPD (chronic obstructive pulmonary disease)    Relevant Medications    tiotropium (SPIRIVA) 18 MCG per inhalation capsule    Pulmonary HTN       Sleep    Sleep apnea in adult     Other Visit Diagnoses       H/O carotid endarterectomy        Relevant Orders    US Carotid Bilateral    Dizziness        Relevant Orders    US Carotid Bilateral    PVD (peripheral vascular disease) with claudication        Relevant Orders    US Arterial Doppler Lower Extremity Bilateral           1. Carotid stenosis/dizziness- s/p R CEA 2014, repeat carotid US for routine fu.      2. HTN/hypertensive heart disease/mild to moderate LVH- stable bp today as well as at home. If increases, need renal artery US.       3. Hyperlipidemia- LDL 83, HDL 42; continue Lipitor. Encourage heart healthy diet. Congratulated on weight loss.      4. COPD/Asthma/VALERIE/PHTN- compliant with CPAP with O2, following with pulmonology// [place].      5. Diabetes- A1C 7.6%, pt reports improvement. Encourage low sugar, low carbohydrate diet.      6. CKD- GFR improved to 42, following with nephrology.     Cardiac stress test and echocardiogram reviewed with her. Further recommendations to follow US.     FU 6 months or sooner  if needed.                 Electronically signed by RENETTA Eng,  August 18, 2023 10:14 EDT

## 2023-08-18 RX ORDER — ATORVASTATIN CALCIUM 10 MG/1
10 TABLET, FILM COATED ORAL DAILY
Qty: 90 TABLET | Refills: 3 | Status: SHIPPED | OUTPATIENT
Start: 2023-08-18

## 2023-08-18 RX ORDER — DILTIAZEM HYDROCHLORIDE 360 MG/1
360 CAPSULE, EXTENDED RELEASE ORAL DAILY
Qty: 90 CAPSULE | Refills: 3 | Status: SHIPPED | OUTPATIENT
Start: 2023-08-18

## 2023-08-18 RX ORDER — AMLODIPINE BESYLATE 5 MG/1
5 TABLET ORAL DAILY
Qty: 90 TABLET | Refills: 3 | Status: SHIPPED | OUTPATIENT
Start: 2023-08-18

## 2023-08-18 RX ORDER — FUROSEMIDE 40 MG/1
20 TABLET ORAL DAILY
Qty: 90 TABLET | Refills: 3 | Status: SHIPPED | OUTPATIENT
Start: 2023-08-18

## 2023-08-29 ENCOUNTER — TELEPHONE (OUTPATIENT)
Dept: CARDIOLOGY | Facility: CLINIC | Age: 78
End: 2023-08-29
Payer: MEDICARE

## 2023-08-29 NOTE — TELEPHONE ENCOUNTER
Caller: Bibiana Quezada    Relationship to patient: Self    Best call back number: [phone]    Patient is needing:   PT STATES SHE HAS SPOKEN WITH [facility] AND WAS TOLD TO HAVE THE OFFICE SEND OVER A FORM SHOWING THE PROCEDURE TO THEIR OFFICE. PT DID NOT HAVE THE FAX NUMBER. THANK YOU

## 2023-09-19 DIAGNOSIS — I65.23 BILATERAL CAROTID ARTERY STENOSIS: Primary | ICD-10-CM

## 2023-09-19 DIAGNOSIS — E78.00 HYPERCHOLESTEREMIA: ICD-10-CM

## 2023-09-19 DIAGNOSIS — Z98.890 H/O CAROTID ENDARTERECTOMY: ICD-10-CM

## 2023-09-19 DIAGNOSIS — I73.9 PVD (PERIPHERAL VASCULAR DISEASE): ICD-10-CM

## 2023-09-19 DIAGNOSIS — R42 DIZZINESS: ICD-10-CM

## 2023-12-15 RX ORDER — METOPROLOL TARTRATE 50 MG/1
50 TABLET, FILM COATED ORAL 2 TIMES DAILY
Qty: 180 TABLET | Refills: 3 | Status: SHIPPED | OUTPATIENT
Start: 2023-12-15

## 2024-04-18 ENCOUNTER — OFFICE VISIT (OUTPATIENT)
Dept: CARDIOLOGY | Facility: CLINIC | Age: 79
End: 2024-04-18
Payer: MEDICARE

## 2024-04-18 VITALS
DIASTOLIC BLOOD PRESSURE: 70 MMHG | BODY MASS INDEX: 33.73 KG/M2 | HEART RATE: 66 BPM | WEIGHT: 197.6 LBS | HEIGHT: 64 IN | SYSTOLIC BLOOD PRESSURE: 120 MMHG

## 2024-04-18 DIAGNOSIS — I73.9 PVD (PERIPHERAL VASCULAR DISEASE): ICD-10-CM

## 2024-04-18 DIAGNOSIS — I10 ESSENTIAL HYPERTENSION: ICD-10-CM

## 2024-04-18 DIAGNOSIS — R00.2 PALPITATIONS: Primary | ICD-10-CM

## 2024-04-18 DIAGNOSIS — R06.02 SHORTNESS OF BREATH: ICD-10-CM

## 2024-04-18 DIAGNOSIS — E66.01 MORBIDLY OBESE: ICD-10-CM

## 2024-04-18 RX ORDER — DILTIAZEM HYDROCHLORIDE 360 MG/1
360 CAPSULE, EXTENDED RELEASE ORAL DAILY
Qty: 90 CAPSULE | Refills: 3 | Status: SHIPPED | OUTPATIENT
Start: 2024-04-18

## 2024-04-18 RX ORDER — CLOPIDOGREL BISULFATE 75 MG/1
75 TABLET ORAL DAILY
Qty: 90 TABLET | Refills: 3 | Status: SHIPPED | OUTPATIENT
Start: 2024-04-18

## 2024-04-18 RX ORDER — FUROSEMIDE 40 MG/1
20 TABLET ORAL DAILY
Qty: 90 TABLET | Refills: 3 | Status: SHIPPED | OUTPATIENT
Start: 2024-04-18

## 2024-04-18 RX ORDER — ATORVASTATIN CALCIUM 10 MG/1
10 TABLET, FILM COATED ORAL DAILY
Qty: 90 TABLET | Refills: 3 | Status: SHIPPED | OUTPATIENT
Start: 2024-04-18

## 2024-04-18 RX ORDER — CLOPIDOGREL BISULFATE 75 MG/1
75 TABLET ORAL DAILY
COMMUNITY
End: 2024-04-18 | Stop reason: SDUPTHER

## 2024-04-18 NOTE — LETTER
April 24, 2024       No Recipients    Patient: Bibiana Quezada   YOB: 1945   Date of Visit: 4/18/2024     Dear Omega Ramirez MD:       Thank you for referring Bibiana Quezada to me for evaluation. Below are the relevant portions of my assessment and plan of care.    If you have questions, please do not hesitate to call me. I look forward to following Bibiana along with you.         Sincerely,        Alyssa RENETTA Orozco        CC:   No Recipients    ErnestoAlyssa dutton RENETTA  04/24/24 1358  Sign when Signing Visit  Chief Complaint   Patient presents with   • Follow-up     Cardiac management   • Palpitations     Having occasional flutter. She reports smart watch has told her she is in Afib at times. Will notices tightness in chest with the rapid heart rate.    • Shortness of Breath     Has with exertion and rest. Wears O2 at night and PRN.    • Chest Pain     Having occasional pressure and squeezing sensation in chest at times, duration is short.    • Lab     Has order from PCP to have labs. Had lower extremity US in [place]. She did have subclavian stent per Dr Choi. Had carotid US at [facility] last Friday.    • Med Refill     Needs refills on cardiac medications except Metoprolol or edarbi-90 day        HPI:  HPI   Bibiana Quezada is a 78 y.o. female with HTN, hypothyroidism, hyperlipidemia, carotid artery disease s/p (R)CEA in January 2014, VALERIE, COPD, DM, and asthma/bronchitis after inhaling noxious chemicals many years ago followed by Dr. Fisher.  Cardiac cath in 2016, 2018, and 2021 showed normal coronaries. On 3/30/2021, she developed chilling, diaphoresis, intermittent substernal chest discomfort.  She was seen at [facility] with mildly elevated blood pressure, borderline elevated troponin and was transferred to [facility].  Initial work-up unremarkable, troponin trended down, EKG stable.  Blood cultures were negative.  Cardiac work-up including stress test and echocardiogram showed no ischemia, normal LV function, normal  LA. Cardiac cath was then performed revealing normal coronary arteries, normal LVEF, and mild to moderate MR. Holter x 12  in May 2021 showed avg HR of 67, NSR, with one SVT 27 beats, no symptoms noted at that time, no pauses. Stress test and echocardiogram 6/21/23 after reporting more dyspnea and chest tightness showed anterior wall changes suggestive of breast attenuation but could not rule out ischemia. EF 70%, PA pressure 37 mmhg, mild to moderate LVH. Minoxidil recommended. Imdur added.      She returns today for follow up with her . Labs on 8/11/2023 GFR 42, glucose 143, normal electrolytes, vitamin D 44 H/H12.3/38, platelets 294. Lipids in June 2022 showed LDL 83, HDL 42, triglycerides 237.  She did have a carotid ultrasound after her last visit 2/2 dizziness the carotid ultrasound did show LICA greater than 70% and SUSHMA 50 to 69%.  CTA of the abdominal aorta with runoff showed atherosclerosis but no significant stenosis and it was determined to continue to monitor.  She also had a CT carotids not of good quality R/T shoulder listhesis and cervical disc fusion so a referral to vascular surgeon, Dr. Choi was put in she did have a stent put in subclavian artery with Dr. Choi.  And had a carotid ultrasound just this past Friday for which I do not have results at this time. She has f/u with Dr Choi next week. Patient denies chest pain, pressure, palpitations, tightness, dizziness, shortness of air. She reports occasional palpitations with SOB. Her watch is showing Afib. She recently lost her  5 months ago.      Cardiac History:    Past Surgical History:   Procedure Laterality Date   • CARDIAC CATHETERIZATION  10/24/2016    Normal Coronaries. Hypertensive Heart Disease.   • CARDIAC CATHETERIZATION  08/21/2018    Normal Coronaries. EF 65%. PA- 60/28(44). LVEDP- 14. PCW 29.   • CARDIAC CATHETERIZATION  03/29/2021    Normal coronaries, normal EF   • CARDIOVASCULAR STRESS TEST  10/12/2016    L.  Myoview- Apical Ischemia   • CARDIOVASCULAR STRESS TEST  05/14/2013    L. Myoview- R/O Apical Ischemia.   • CARDIOVASCULAR STRESS TEST  12/14/2011     L. Myoview- (Fl) Negative    • CARDIOVASCULAR STRESS TEST  10/12/2016    Lexiscan- small apical ischemia, increase Imdur, cath   • CARDIOVASCULAR STRESS TEST  03/27/2021    ([facility]) Lexiscan-no ischemia, EF 76%   • CARDIOVASCULAR STRESS TEST  06/21/2023    Lexiscan- EF 59%. 197/55. Breast Attenuation Vs Anterior Ischemia   • CAROTID ENDARTERECTOMY  01/16/2014    (R) Endarterectomy by Dr. Clayton   • ECHO - CONVERTED  08/07/2012    EF 65%   • ECHO - CONVERTED  10/12/2016    EF 65%, mild MR, borderline PHT   • ECHO - CONVERTED  03/13/2018    @[facility]. EF 65%. RVSP_ 55 mmHg   • ECHO - CONVERTED  09/24/2019    EF 65-70%. LA- 3.8 Cm. NIKITA- 1.7 Cm2, Mild MR. RVSP- 50 mmHg.   • ECHO - CONVERTED  03/26/2021    ([facility]) EF normal, LA normal, RVSP 19 mmHg   • ECHO - CONVERTED  06/21/2023    [facility]. EF 65%. LA- 4.1. Trace-Mild MR. RVSP- 37 mmHg   • OTHER SURGICAL HISTORY  03/18/2020    CTA with runoff:  No sig peripheral stenosis bilaterally, fatty liver   • OTHER SURGICAL HISTORY  06/24/2021    MCOT- 12 Days. AVG 67. . 27 beats run of SVT   •  CAROTID UNILATERAL  12/03/2013    @[facility]- Critical Stenosis  (R) ICA.   •  CAROTID UNILATERAL  09/24/2019    No sig stenosis of right CEA, no sig stenosis left carotid   •  CAROTID UNILATERAL  03/27/2021    <50% stenosis bilaterally       Current Outpatient Medications   Medication Sig Dispense Refill   • albuterol (PROVENTIL HFA;VENTOLIN HFA) 108 (90 BASE) MCG/ACT inhaler Inhale 2 puffs Every 4 (Four) Hours As Needed for Wheezing.     • ascorbic acid (VITAMIN C) 1000 MG tablet Take 1 tablet by mouth Daily.     • aspirin 81 MG EC tablet Take 1 tablet by mouth Daily.     • atorvastatin (LIPITOR) 10 MG tablet Take 1 tablet by mouth Daily. 90 tablet 3   • B Complex Vitamins (VITAMIN-B COMPLEX PO) Take  by mouth Daily.     • CALCIUM PO Take  by  mouth Daily.     • Certolizumab Pegol (CIMZIA SC) Inject  under the skin into the appropriate area as directed Every 30 (Thirty) Days.     • clopidogrel (PLAVIX) 75 MG tablet Take 1 tablet by mouth Daily. 90 tablet 3   • dilTIAZem (TIAZAC) 360 MG 24 hr capsule Take 1 capsule by mouth Daily. 90 capsule 3   • DULoxetine (CYMBALTA) 60 MG capsule Take 1 capsule by mouth Daily.     • Edarbi 80 MG tablet tablet Take 1 tablet by mouth Daily. 90 tablet 3   • esomeprazole (nexIUM) 40 MG capsule Take 1 capsule by mouth 2 (Two) Times a Day.     • famotidine (PEPCID) 40 MG tablet Take 1 tablet by mouth Every Night.     • furosemide (LASIX) 40 MG tablet Take 0.5 tablets by mouth Daily. 90 tablet 3   • gabapentin (NEURONTIN) 400 MG capsule One tablet in morning, 1 tablets at noon, 4 tablets at night and 1 tablet as needed at night     • insulin regular (HumuLIN R) 500 UNIT/ML patient supplied pump Inject  under the skin into the appropriate area as directed Continuous.     • levothyroxine (SYNTHROID, LEVOTHROID) 88 MCG tablet Take 1 tablet by mouth Daily.     • metoprolol tartrate (LOPRESSOR) 50 MG tablet Take 1 tablet by mouth 2 (Two) Times a Day. 180 tablet 3   • montelukast (SINGULAIR) 10 MG tablet Take 1 tablet by mouth Every Night.     • Multiple Vitamin (MULTI VITAMIN DAILY PO) Take  by mouth Daily.     • nitroglycerin (NITROSTAT) 0.4 MG SL tablet 1 under the tongue as needed for angina, may repeat q5mins for up three doses 30 tablet 1   • O2 (OXYGEN) Inhale 2 L/min Every Night.     • PRIMIDONE PO Take 5 mg by mouth Every Night.     • tiotropium (SPIRIVA) 18 MCG per inhalation capsule Place 1 capsule into inhaler and inhale Daily.       No current facility-administered medications for this visit.       Augmentin [amoxicillin-pot clavulanate], Codeine, Demerol [meperidine], Hydrocodone, Lescol [fluvastatin sodium], Livalo [pitavastatin], Morphine and related, Percocet [oxycodone-acetaminophen], Percodan [oxycodone-aspirin],  "Pravachol [pravastatin sodium], Talwin [pentazocine], Ultram [tramadol hcl], Zetia [ezetimibe], Zocor [simvastatin], Other, and Tape    Past Medical History:   Diagnosis Date   • Anemia    • Cancer     Skin cancer removed.   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    • HTN (hypertension)    • Hypercholesterolemia    • Palpitations    • Previous back surgery     x5   • Psoriatic arthritis    • S/P GA (total abdominal hysterectomy)    • Sleep apnea     wears CPAP   • TIA (transient ischemic attack)        Social History     Socioeconomic History   • Marital status:    Tobacco Use   • Smoking status: Former     Passive exposure: Past   • Smokeless tobacco: Never   Vaping Use   • Vaping status: Never Used   Substance and Sexual Activity   • Alcohol use: Not Currently     Comment: occasional wine   • Drug use: No   • Sexual activity: Defer       Family History   Problem Relation Age of Onset   • Heart attack Mother    • Pancreatic cancer Father    • Other Brother         GABG   • Hypertension Other        Vitals:   /70 (BP Location: Left arm, Patient Position: Sitting)   Pulse 66   Ht 162 cm (63.78\")   Wt 89.6 kg (197 lb 9.6 oz)   BMI 34.15 kg/m²     Physical Exam  Vitals and nursing note reviewed.   Constitutional:       Appearance: She is morbidly obese.   Neck:      Vascular: No carotid bruit.   Cardiovascular:      Rate and Rhythm: Normal rate and regular rhythm.      Pulses: Normal pulses.      Heart sounds: Murmur heard.      Systolic murmur is present with a grade of 3/6.      No friction rub. No gallop.   Pulmonary:      Effort: Pulmonary effort is " normal.      Breath sounds: Normal breath sounds and air entry.   Musculoskeletal:      Right lower leg: No edema.      Left lower leg: No edema.   Skin:     General: Skin is warm and dry.      Capillary Refill: Capillary refill takes less than 2 seconds.   Neurological:      Mental Status: She is alert and oriented to person, place, and time.         ECG 12 Lead    Date/Time: 4/18/2024 3:05 PM  Performed by: Alyssa Orozco APRN    Authorized by: Alyssa Orozco APRN  Previous ECG: no previous ECG available  Rhythm: sinus rhythm and sinus arrhythmia  Rate: normal  BPM: 66  Other findings: non-specific ST-T wave changes    Clinical impression: non-specific ECG  Comments: Qtc 425 ms           Assessment & Plan    Diagnoses and all orders for this visit:    1. Palpitations (Primary)  -     Holter Monitor - 72 Hour Up To 15 Days; Future    2. Essential hypertension    3. PVD (peripheral vascular disease)    4. Morbidly obese    5. Shortness of breath  -     Holter Monitor - 72 Hour Up To 15 Days; Future    Other orders  -     ECG 12 Lead  -     atorvastatin (LIPITOR) 10 MG tablet; Take 1 tablet by mouth Daily.  Dispense: 90 tablet; Refill: 3  -     clopidogrel (PLAVIX) 75 MG tablet; Take 1 tablet by mouth Daily.  Dispense: 90 tablet; Refill: 3  -     dilTIAZem (TIAZAC) 360 MG 24 hr capsule; Take 1 capsule by mouth Daily.  Dispense: 90 capsule; Refill: 3  -     furosemide (LASIX) 40 MG tablet; Take 0.5 tablets by mouth Daily.  Dispense: 90 tablet; Refill: 3    Palpitations  - Recommend Holter for 7 days    HTN  - BP controlled  - Continue with current regimen of metoprolol, diltiazem  - Discontinue amlodipine because it is duplicate therapy    Holter for 7 days, no further cardiac workup,  refills sent. DC amlodipine bc duplicate therapy with Diltiazem EKG NSR with sinus arrhythmia with ST-T wave abnormality rate 66 BPM Qtc 425 ms    Visit Diagnoses:    ICD-10-CM ICD-9-CM   1. Palpitations  R00.2 785.1   2. Essential  hypertension  I10 401.9   3. PVD (peripheral vascular disease)  I73.9 443.9   4. Morbidly obese  E66.01 278.01   5. Shortness of breath  R06.02 786.05       Meds Ordered During Visit:  New Medications Ordered This Visit   Medications   • atorvastatin (LIPITOR) 10 MG tablet     Sig: Take 1 tablet by mouth Daily.     Dispense:  90 tablet     Refill:  3   • clopidogrel (PLAVIX) 75 MG tablet     Sig: Take 1 tablet by mouth Daily.     Dispense:  90 tablet     Refill:  3   • dilTIAZem (TIAZAC) 360 MG 24 hr capsule     Sig: Take 1 capsule by mouth Daily.     Dispense:  90 capsule     Refill:  3   • furosemide (LASIX) 40 MG tablet     Sig: Take 0.5 tablets by mouth Daily.     Dispense:  90 tablet     Refill:  3       Follow Up Appointment:   Return in about 6 months (around 10/18/2024), or if symptoms worsen or fail to improve.           This document has been electronically signed by RENETTA Hawkins  April 24, 2024 13:57 EDT    Dictated Utilizing Dragon Dictation: Part of this note may be an electronic transcription/translation of spoken language to printed text using the Dragon Dictation System.

## 2024-04-18 NOTE — PROGRESS NOTES
Chief Complaint   Patient presents with    Follow-up     Cardiac management    Palpitations     Having occasional flutter. She reports smart watch has told her she is in Afib at times. Will notices tightness in chest with the rapid heart rate.     Shortness of Breath     Has with exertion and rest. Wears O2 at night and PRN.     Chest Pain     Having occasional pressure and squeezing sensation in chest at times, duration is short.     Lab     Has order from PCP to have labs. Had lower extremity US in [place]. She did have subclavian stent per Dr Choi. Had carotid US at [facility] last Friday.     Med Refill     Needs refills on cardiac medications except Metoprolol or edarbi-90 day        HPI:  HPI   Bibiana Quezada is a 78 y.o. female with HTN, hypothyroidism, hyperlipidemia, carotid artery disease s/p (R)CEA in January 2014, VALERIE, COPD, DM, and asthma/bronchitis after inhaling noxious chemicals many years ago followed by Dr. Fisher.  Cardiac cath in 2016, 2018, and 2021 showed normal coronaries. On 3/30/2021, she developed chilling, diaphoresis, intermittent substernal chest discomfort.  She was seen at [facility] with mildly elevated blood pressure, borderline elevated troponin and was transferred to [facility].  Initial work-up unremarkable, troponin trended down, EKG stable.  Blood cultures were negative.  Cardiac work-up including stress test and echocardiogram showed no ischemia, normal LV function, normal LA. Cardiac cath was then performed revealing normal coronary arteries, normal LVEF, and mild to moderate MR. Holter x 12  in May 2021 showed avg HR of 67, NSR, with one SVT 27 beats, no symptoms noted at that time, no pauses. Stress test and echocardiogram 6/21/23 after reporting more dyspnea and chest tightness showed anterior wall changes suggestive of breast attenuation but could not rule out ischemia. EF 70%, PA pressure 37 mmhg, mild to moderate LVH. Minoxidil recommended. Imdur added.      She returns today for follow up  with her . Labs on 8/11/2023 GFR 42, glucose 143, normal electrolytes, vitamin D 44 H/H12.3/38, platelets 294. Lipids in June 2022 showed LDL 83, HDL 42, triglycerides 237.  She did have a carotid ultrasound after her last visit 2/2 dizziness the carotid ultrasound did show LICA greater than 70% and SUSHMA 50 to 69%.  CTA of the abdominal aorta with runoff showed atherosclerosis but no significant stenosis and it was determined to continue to monitor.  She also had a CT carotids not of good quality R/T shoulder listhesis and cervical disc fusion so a referral to vascular surgeon, Dr. Choi was put in she did have a stent put in subclavian artery with Dr. Choi.  And had a carotid ultrasound just this past Friday for which I do not have results at this time. She has f/u with Dr Choi next week. Patient denies chest pain, pressure, palpitations, tightness, dizziness, shortness of air. She reports occasional palpitations with SOB. Her watch is showing Afib. She recently lost her  5 months ago.      Cardiac History:    Past Surgical History:   Procedure Laterality Date    CARDIAC CATHETERIZATION  10/24/2016    Normal Coronaries. Hypertensive Heart Disease.    CARDIAC CATHETERIZATION  08/21/2018    Normal Coronaries. EF 65%. PA- 60/28(44). LVEDP- 14. PCW 29.    CARDIAC CATHETERIZATION  03/29/2021    Normal coronaries, normal EF    CARDIOVASCULAR STRESS TEST  10/12/2016    L. Myoview- Apical Ischemia    CARDIOVASCULAR STRESS TEST  05/14/2013    L. Myoview- R/O Apical Ischemia.    CARDIOVASCULAR STRESS TEST  12/14/2011     L. Myoview- (Fl) Negative     CARDIOVASCULAR STRESS TEST  10/12/2016    Lexiscan- small apical ischemia, increase Imdur, cath    CARDIOVASCULAR STRESS TEST  03/27/2021    ([facility]) Lexiscan-no ischemia, EF 76%    CARDIOVASCULAR STRESS TEST  06/21/2023    Lexiscan- EF 59%. 197/55. Breast Attenuation Vs Anterior Ischemia    CAROTID ENDARTERECTOMY  01/16/2014    (R) Endarterectomy by Dr. Clayton     ECHO - CONVERTED  08/07/2012    EF 65%    ECHO - CONVERTED  10/12/2016    EF 65%, mild MR, borderline PHT    ECHO - CONVERTED  03/13/2018    @RS. EF 65%. RVSP_ 55 mmHg    ECHO - CONVERTED  09/24/2019    EF 65-70%. LA- 3.8 Cm. NIKITA- 1.7 Cm2, Mild MR. RVSP- 50 mmHg.    ECHO - CONVERTED  03/26/2021    ([facility]) EF normal, LA normal, RVSP 19 mmHg    ECHO - CONVERTED  06/21/2023    TLS. EF 65%. LA- 4.1. Trace-Mild MR. RVSP- 37 mmHg    OTHER SURGICAL HISTORY  03/18/2020    CTA with runoff:  No sig peripheral stenosis bilaterally, fatty liver    OTHER SURGICAL HISTORY  06/24/2021    MCOT- 12 Days. AVG 67. . 27 beats run of SVT    US CAROTID UNILATERAL  12/03/2013    @RS- Critical Stenosis  (R) ICA.    US CAROTID UNILATERAL  09/24/2019    No sig stenosis of right CEA, no sig stenosis left carotid    US CAROTID UNILATERAL  03/27/2021    <50% stenosis bilaterally       Current Outpatient Medications   Medication Sig Dispense Refill    albuterol (PROVENTIL HFA;VENTOLIN HFA) 108 (90 BASE) MCG/ACT inhaler Inhale 2 puffs Every 4 (Four) Hours As Needed for Wheezing.      ascorbic acid (VITAMIN C) 1000 MG tablet Take 1 tablet by mouth Daily.      aspirin 81 MG EC tablet Take 1 tablet by mouth Daily.      atorvastatin (LIPITOR) 10 MG tablet Take 1 tablet by mouth Daily. 90 tablet 3    B Complex Vitamins (VITAMIN-B COMPLEX PO) Take  by mouth Daily.      CALCIUM PO Take  by mouth Daily.      Certolizumab Pegol (CIMZIA SC) Inject  under the skin into the appropriate area as directed Every 30 (Thirty) Days.      clopidogrel (PLAVIX) 75 MG tablet Take 1 tablet by mouth Daily. 90 tablet 3    dilTIAZem (TIAZAC) 360 MG 24 hr capsule Take 1 capsule by mouth Daily. 90 capsule 3    DULoxetine (CYMBALTA) 60 MG capsule Take 1 capsule by mouth Daily.      Edarbi 80 MG tablet tablet Take 1 tablet by mouth Daily. 90 tablet 3    esomeprazole (nexIUM) 40 MG capsule Take 1 capsule by mouth 2 (Two) Times a Day.      famotidine (PEPCID) 40 MG tablet  Take 1 tablet by mouth Every Night.      furosemide (LASIX) 40 MG tablet Take 0.5 tablets by mouth Daily. 90 tablet 3    gabapentin (NEURONTIN) 400 MG capsule One tablet in morning, 1 tablets at noon, 4 tablets at night and 1 tablet as needed at night      insulin regular (HumuLIN R) 500 UNIT/ML patient supplied pump Inject  under the skin into the appropriate area as directed Continuous.      levothyroxine (SYNTHROID, LEVOTHROID) 88 MCG tablet Take 1 tablet by mouth Daily.      metoprolol tartrate (LOPRESSOR) 50 MG tablet Take 1 tablet by mouth 2 (Two) Times a Day. 180 tablet 3    montelukast (SINGULAIR) 10 MG tablet Take 1 tablet by mouth Every Night.      Multiple Vitamin (MULTI VITAMIN DAILY PO) Take  by mouth Daily.      nitroglycerin (NITROSTAT) 0.4 MG SL tablet 1 under the tongue as needed for angina, may repeat q5mins for up three doses 30 tablet 1    O2 (OXYGEN) Inhale 2 L/min Every Night.      PRIMIDONE PO Take 5 mg by mouth Every Night.      tiotropium (SPIRIVA) 18 MCG per inhalation capsule Place 1 capsule into inhaler and inhale Daily.       No current facility-administered medications for this visit.       Augmentin [amoxicillin-pot clavulanate], Codeine, Demerol [meperidine], Hydrocodone, Lescol [fluvastatin sodium], Livalo [pitavastatin], Morphine and related, Percocet [oxycodone-acetaminophen], Percodan [oxycodone-aspirin], Pravachol [pravastatin sodium], Talwin [pentazocine], Ultram [tramadol hcl], Zetia [ezetimibe], Zocor [simvastatin], Other, and Tape    Past Medical History:   Diagnosis Date    Anemia     Cancer     Skin cancer removed.    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     HTN (hypertension)     Hypercholesterolemia     Palpitations     Previous back surgery     x5    Psoriatic arthritis     S/P GA (total abdominal hysterectomy)     Sleep apnea     wears CPAP    TIA (transient ischemic attack)        Social History     Socioeconomic History    Marital status:    Tobacco Use    Smoking status: Former     Passive exposure: Past    Smokeless tobacco: Never   Vaping Use    Vaping status: Never Used   Substance and Sexual Activity    Alcohol use: Not Currently     Comment: occasional wine    Drug use: No    Sexual activity: Defer       Family History   Problem Relation Age of Onset    Heart attack Mother     Pancreatic cancer Father     Other Brother         GABG    Hypertension Other        Vitals:   /70 (BP Location: Left arm, Patient Position: Sitting)   Pulse 66   Ht 162 cm (63.78\")   Wt 89.6 kg (197 lb 9.6 oz)   BMI 34.15 kg/m²     Physical Exam  Vitals and nursing note reviewed.   Constitutional:       Appearance: She is morbidly obese.   Neck:      Vascular: No carotid bruit.   Cardiovascular:      Rate and Rhythm: Normal rate and regular rhythm.      Pulses: Normal pulses.      Heart sounds: Murmur heard.      Systolic murmur is present with a grade of 3/6.      No friction rub. No gallop.   Pulmonary:      Effort: Pulmonary effort is normal.      Breath sounds: Normal breath sounds and air entry.   Musculoskeletal:      Right lower leg: No edema.      Left lower leg: No edema.   Skin:     General: Skin is warm and dry.      Capillary Refill: Capillary refill takes less than 2 seconds.   Neurological:      Mental Status: She is alert and oriented to person, place, and time.         ECG 12 Lead    Date/Time: 4/18/2024 3:05 PM  Performed by: Alyssa Orozco APRN    Authorized by: Alyssa Orozco APRN  Previous ECG: no previous ECG available  Rhythm: sinus rhythm and sinus arrhythmia  Rate: normal  BPM: 66  Other findings: non-specific ST-T wave " changes    Clinical impression: non-specific ECG  Comments: Qtc 425 ms           Assessment & Plan     Diagnoses and all orders for this visit:    1. Palpitations (Primary)  -     Holter Monitor - 72 Hour Up To 15 Days; Future    2. Essential hypertension    3. PVD (peripheral vascular disease)    4. Morbidly obese    5. Shortness of breath  -     Holter Monitor - 72 Hour Up To 15 Days; Future    Other orders  -     ECG 12 Lead  -     atorvastatin (LIPITOR) 10 MG tablet; Take 1 tablet by mouth Daily.  Dispense: 90 tablet; Refill: 3  -     clopidogrel (PLAVIX) 75 MG tablet; Take 1 tablet by mouth Daily.  Dispense: 90 tablet; Refill: 3  -     dilTIAZem (TIAZAC) 360 MG 24 hr capsule; Take 1 capsule by mouth Daily.  Dispense: 90 capsule; Refill: 3  -     furosemide (LASIX) 40 MG tablet; Take 0.5 tablets by mouth Daily.  Dispense: 90 tablet; Refill: 3    Palpitations  - Recommend Holter for 7 days    HTN  - BP controlled  - Continue with current regimen of metoprolol, diltiazem  - Discontinue amlodipine because it is duplicate therapy    Holter for 7 days, no further cardiac workup,  refills sent. DC amlodipine bc duplicate therapy with Diltiazem EKG NSR with sinus arrhythmia with ST-T wave abnormality rate 66 BPM Qtc 425 ms    Visit Diagnoses:    ICD-10-CM ICD-9-CM   1. Palpitations  R00.2 785.1   2. Essential hypertension  I10 401.9   3. PVD (peripheral vascular disease)  I73.9 443.9   4. Morbidly obese  E66.01 278.01   5. Shortness of breath  R06.02 786.05       Meds Ordered During Visit:  New Medications Ordered This Visit   Medications    atorvastatin (LIPITOR) 10 MG tablet     Sig: Take 1 tablet by mouth Daily.     Dispense:  90 tablet     Refill:  3    clopidogrel (PLAVIX) 75 MG tablet     Sig: Take 1 tablet by mouth Daily.     Dispense:  90 tablet     Refill:  3    dilTIAZem (TIAZAC) 360 MG 24 hr capsule     Sig: Take 1 capsule by mouth Daily.     Dispense:  90 capsule     Refill:  3    furosemide (LASIX) 40 MG  tablet     Sig: Take 0.5 tablets by mouth Daily.     Dispense:  90 tablet     Refill:  3       Follow Up Appointment:   Return in about 6 months (around 10/18/2024), or if symptoms worsen or fail to improve.           This document has been electronically signed by RENETTA Hawkins  April 24, 2024 13:57 EDT    Dictated Utilizing Dragon Dictation: Part of this note may be an electronic transcription/translation of spoken language to printed text using the Dragon Dictation System.

## 2024-07-18 NOTE — TELEPHONE ENCOUNTER
Occupational Therapy    Evaluation    Patient Name: Terrance Armijo  MRN: [id number]  Today's Date: 7/18/2024  Time Calculation  Start Time: 0919  Stop Time: 0943  Time Calculation (min): 24 min    Assessment  IP OT Assessment  OT Assessment: Pt. is a 86 y.o. male referred to occupational therapy for imapried self-care 2/2 admisison for sepsis d/t acute respiratory failure. Pt. reports he is at his baseline for ADLs and declines further OT services at this time. Pt. plans to work with PT for ambulation, balance and transfer deficits.  Prognosis: Good  Barriers to Discharge: None  Evaluation/Treatment Tolerance: Patient tolerated treatment well  Medical Staff Made Aware: Yes  End of Session Communication: Bedside nurse  End of Session Patient Position: Up in chair, Alarm on  Plan:  No Skilled OT: Patient refusal  OT Frequency: OT eval only  OT Discharge Recommendations: No further acute OT, No OT needed after discharge  Equipment Recommended upon Discharge: Straight cane  OT - OK to Discharge: Yes (Based on completed evaluation and care plan recommendations, no barriers to discharge to next site of care)    Subjective   Current Problem:  1. Sepsis with acute hypoxic respiratory failure without septic shock, due to unspecified organism (Multi)        2. Hyponatremia        3. Pneumonia of right lower lobe due to infectious organism          General:  General  Reason for Referral: impaired self-care d/t admission for sepsis d/t acute hypoxic respiratory failure  Referred By: YESSICA Saeed  Past Medical History Relevant to Rehab: Basal cell carcinoma skin, dermatitis, HTN, GERD, manocytic nevi of trunk, hydrocele of male genitalia, pacemaker, internal cardiac defibrillator  Family/Caregiver Present: No  Prior to Session Communication: Bedside nurse  Patient Position Received: Bed, 2 rail up, Alarm off, not on at start of session  General Comment: paula nieves, tele, IV, BP cuff  Precautions:  Medical  See encounter 5/31/23.   Precautions: Fall precautions  Vital Signs:  Heart Rate Source: Monitor  SpO2: 92 %  Patient Position: Lying  Pain:  Pain Assessment  Pain Assessment: 0-10  0-10 (Numeric) Pain Score: 0 - No pain    Objective   Cognition:  Overall Cognitive Status: Within Functional Limits  Orientation Level: Oriented X4  Home Living:  Type of Home: House  Lives With: Spouse  Home Layout: Multi-level  Home Access: Stairs to enter with rails  Entrance Stairs-Rails:  (one rail)  Entrance Stairs-Number of Steps: 1 (3 steps to kitchen 1 HR, basement flight 1 HR, upstairs flight 1 HR)  Bathroom Shower/Tub: Tub/shower unit  Bathroom Toilet: Handicapped height  Bathroom Equipment: None   Prior Function:  Level of Christian: Independent with ADLs and functional transfers, Independent with homemaking with ambulation  Receives Help From: Family  ADL Assistance: Independent  Homemaking Assistance: Independent  Ambulatory Assistance: Independent  Vocational: Retired, Part time employment (works part time at [employer])  IADL History:  Homemaking Responsibilities: Yes  Meal Prep Responsibility: Primary  Laundry Responsibility: Secondary  Cleaning Responsibility: Primary  Bill Paying/Finance Responsibility: Primary  Shopping Responsibility: Primary  Current License: Yes  ADL:  Eating Assistance: Independent  Grooming Assistance: Independent  Bathing Assistance: Independent  UE Dressing Assistance: Independent  LE Dressing Assistance: Stand by  LE Dressing Deficit: Increased time to complete, Supervision/safety  Toileting Assistance with Device: Independent  Activity Tolerance:  Endurance: Endurance does not limit participation in activity  Bed Mobility/Transfers: Bed Mobility  Bed Mobility: Yes  Bed Mobility 1  Bed Mobility 1: Supine to sitting  Level of Assistance 1: Distant supervision    Transfers  Transfer: Yes  Transfer 1  Transfer From 1: Bed to  Transfer to 1: Sit, Stand  Technique 1: Sit to stand, Stand to sit  Transfer Level  of Assistance 1: Close supervision  Transfers 2  Transfer From 2: Stand to  Transfer to 2: Sit, Chair with arms  Technique 2: Sit to stand, Stand to sit  Transfer Level of Assistance 2: Close supervision      Functional Mobility:  Functional Mobility  Functional Mobility Performed: Yes  Functional Mobility 1  Surface 1: Level tile  Device 1: No device  Assistance 1: Contact guard, Close supervision  Comments 1: LOB x2 with assist to correct  Sitting Balance:  Static Sitting Balance  Static Sitting-Balance Support: Feet supported  Static Sitting-Level of Assistance: Independent  Dynamic Sitting Balance  Dynamic Sitting-Balance Support: Feet supported  Dynamic Sitting-Balance: Forward lean  Dynamic Sitting-Comments: IND  Standing Balance:  Static Standing Balance  Static Standing-Balance Support: No upper extremity supported  Static Standing-Level of Assistance: Close supervision  Dynamic Standing Balance  Dynamic Standing-Balance Support: No upper extremity supported  Dynamic Standing-Balance: Turning  Dynamic Standing-Comments: close sup; CGA  IADL's:   Homemaking Responsibilities: Yes  Meal Prep Responsibility: Primary  Laundry Responsibility: Secondary  Cleaning Responsibility: Primary  Bill Paying/Finance Responsibility: Primary  Shopping Responsibility: Primary  Current License: Yes  Vision: Vision - Basic Assessment  Current Vision: Wears glasses all the time  Coordination:  Movements are Fluid and Coordinated: Yes   Hand Function:  Hand Function  Gross Grasp: Functional  Coordination: Functional  Extremities: RUE   RUE : Within Functional Limits and LUE   LUE: Within Functional Limits    Outcome Measures: [facility] Daily Activity  Putting on and taking off regular lower body clothing: A little  Bathing (including washing, rinsing, drying): None  Putting on and taking off regular upper body clothing: None  Toileting, which includes using toilet, bedpan or urinal: A little  Taking care of personal grooming such as  brushing teeth: None  Eating Meals: None  Daily Activity - Total Score: 22      Education Documentation  Precautions, taught by Alessandra Saunders OT at 7/18/2024 11:09 AM.  Learner: Patient  Readiness: Acceptance  Method: Explanation  Response: Verbalizes Understanding  Comment: Edu on POC and DC rec. Edu on services.    ADL Training, taught by Alessandra Saunders OT at 7/18/2024 11:09 AM.  Learner: Patient  Readiness: Acceptance  Method: Explanation  Response: Verbalizes Understanding  Comment: Edu on POC and DC rec. Edu on services.    Education Comments  No comments found.      Goals: Not established patient reports he is at baseline for ADL and declines services.

## 2024-10-28 ENCOUNTER — OFFICE VISIT (OUTPATIENT)
Dept: CARDIOLOGY | Facility: CLINIC | Age: 79
End: 2024-10-28
Payer: MEDICARE

## 2024-10-28 VITALS
SYSTOLIC BLOOD PRESSURE: 118 MMHG | BODY MASS INDEX: 32.95 KG/M2 | RESPIRATION RATE: 18 BRPM | HEIGHT: 64 IN | WEIGHT: 193 LBS | HEART RATE: 64 BPM | DIASTOLIC BLOOD PRESSURE: 65 MMHG

## 2024-10-28 DIAGNOSIS — E11.65 TYPE 2 DIABETES MELLITUS WITH HYPERGLYCEMIA, WITH LONG-TERM CURRENT USE OF INSULIN: ICD-10-CM

## 2024-10-28 DIAGNOSIS — F43.21 GRIEF: ICD-10-CM

## 2024-10-28 DIAGNOSIS — E78.00 HYPERCHOLESTEREMIA: ICD-10-CM

## 2024-10-28 DIAGNOSIS — Z79.4 TYPE 2 DIABETES MELLITUS WITH HYPERGLYCEMIA, WITH LONG-TERM CURRENT USE OF INSULIN: ICD-10-CM

## 2024-10-28 DIAGNOSIS — I10 ESSENTIAL HYPERTENSION: ICD-10-CM

## 2024-10-28 DIAGNOSIS — R00.2 PALPITATIONS: Primary | ICD-10-CM

## 2024-10-28 DIAGNOSIS — R07.89 CHEST TIGHTNESS: ICD-10-CM

## 2024-10-28 DIAGNOSIS — I65.23 BILATERAL CAROTID ARTERY STENOSIS: ICD-10-CM

## 2024-10-28 DIAGNOSIS — G47.30 SLEEP APNEA IN ADULT: ICD-10-CM

## 2024-10-28 DIAGNOSIS — E66.811 OBESITY (BMI 30.0-34.9): ICD-10-CM

## 2024-10-28 PROCEDURE — 99214 OFFICE O/P EST MOD 30 MIN: CPT | Performed by: NURSE PRACTITIONER

## 2024-10-28 PROCEDURE — 3078F DIAST BP <80 MM HG: CPT | Performed by: NURSE PRACTITIONER

## 2024-10-28 PROCEDURE — 3074F SYST BP LT 130 MM HG: CPT | Performed by: NURSE PRACTITIONER

## 2024-10-28 RX ORDER — METOPROLOL TARTRATE 50 MG
50 TABLET ORAL 2 TIMES DAILY
Qty: 180 TABLET | Refills: 3 | Status: SHIPPED | OUTPATIENT
Start: 2024-10-28

## 2024-10-28 RX ORDER — ATORVASTATIN CALCIUM 10 MG/1
10 TABLET, FILM COATED ORAL DAILY
Qty: 90 TABLET | Refills: 3 | Status: SHIPPED | OUTPATIENT
Start: 2024-10-28

## 2024-10-28 RX ORDER — DILTIAZEM HYDROCHLORIDE 360 MG/1
360 CAPSULE, EXTENDED RELEASE ORAL DAILY
Qty: 90 CAPSULE | Refills: 3 | Status: SHIPPED | OUTPATIENT
Start: 2024-10-28

## 2024-10-28 RX ORDER — NITROGLYCERIN 0.4 MG/1
TABLET SUBLINGUAL
Qty: 90 TABLET | Refills: 1 | Status: SHIPPED | OUTPATIENT
Start: 2024-10-28

## 2024-10-28 RX ORDER — CLOPIDOGREL BISULFATE 75 MG/1
75 TABLET ORAL DAILY
Qty: 90 TABLET | Refills: 3 | Status: SHIPPED | OUTPATIENT
Start: 2024-10-28

## 2024-10-28 RX ORDER — SECUKINUMAB 25 MG/ML
INJECTION, SOLUTION, CONCENTRATE INTRAVENOUS
COMMUNITY

## 2024-10-28 NOTE — PROGRESS NOTES
Chief Complaint   Patient presents with    Med Refill     90 day refills on cardiac refills to [facility].  No med list brought to appointment    Follow-up     For Cardiac management pt states that she has had some palpitations with last episode  being on October 19. Describes it as being pressure in the middle of chest lasting about 5 mins. Denies any soa, dizziness. Last labs not in chart per pcp done last month       Cardiac Complaints  chest pressure/discomfort and palpitations      Subjective   Bibiana Quezada is a 79 y.o. female with HTN, hypothyroidism, hyperlipidemia, carotid artery disease s/p (R)CEA in January 2014, VALERIE, COPD, DM, and asthma/bronchitis after inhaling noxious chemicals many years ago followed by Dr. Fisher.  Cardiac cath in 2016, 2018, and 2021 showed normal coronaries. On 3/30/2021, she developed chilling, diaphoresis, intermittent substernal chest discomfort.  She was seen at [facility] with mildly elevated blood pressure, borderline elevated troponin and was transferred to [facility].  Initial work-up unremarkable, troponin trended down, EKG stable.  Blood cultures were negative.  Cardiac work-up including stress test and echocardiogram showed no ischemia, normal LV function, normal LA. Cardiac cath was then performed revealing normal coronary arteries, normal LVEF, and mild to moderate MR. Holter x 12  in May 2021 showed avg HR of 67, NSR, with one SVT 27 beats, no symptoms noted at that time, no pauses. Stress test and echocardiogram 6/21/23 after reporting more dyspnea and chest tightness showed anterior wall changes suggestive of breast attenuation but could not rule out ischemia. EF 70%, PA pressure 37 mmhg, mild to moderate LVH. Minoxidil recommended. Imdur added. She did not add either. CTA of the abdominal aorta with runoff showed atherosclerosis but no significant stenosis and it was determined to continue to monitor.  Carotid US 2/2024 showed LICA >70%, SUSHMA 50-69%. She also had a CT carotids  not of good quality R/T shoulder listhesis and cervical disc fusion, so a referral to vascular surgeon, Dr. Choi was put in and she did have a stent put in subclavian artery with Dr. Choi.     She comes today for follow up and admits to more chest pain as of late. She describes it as a pain in the center of her chest that lasts for about 5 minutes at a time, she reports palpitations being accompanied with the pain. She admits it is the year anniversary of her 's death. No SOA, diaphoresis, or weakness noted. Labs are followed by PCP, checked about a month ago. Refills are requested, but she does not have an up to date med list available.        Cardiac History  Past Surgical History:   Procedure Laterality Date    CARDIAC CATHETERIZATION  10/24/2016    Normal Coronaries. Hypertensive Heart Disease.    CARDIAC CATHETERIZATION  08/21/2018    Normal Coronaries. EF 65%. PA- 60/28(44). LVEDP- 14. PCW 29.    CARDIAC CATHETERIZATION  03/29/2021    Normal coronaries, normal EF    CARDIOVASCULAR STRESS TEST  10/12/2016    L. Myoview- Apical Ischemia    CARDIOVASCULAR STRESS TEST  05/14/2013    L. Myoview- R/O Apical Ischemia.    CARDIOVASCULAR STRESS TEST  12/14/2011     L. Myoview- (Fl) Negative     CARDIOVASCULAR STRESS TEST  10/12/2016    Lexiscan- small apical ischemia, increase Imdur, cath    CARDIOVASCULAR STRESS TEST  03/27/2021    ([facility]) Lexiscan-no ischemia, EF 76%    CARDIOVASCULAR STRESS TEST  06/21/2023    Lexiscan- EF 59%. 197/55. Breast Attenuation Vs Anterior Ischemia    CAROTID ENDARTERECTOMY  01/16/2014    (R) Endarterectomy by Dr. Clayton    CONVERTED (HISTORICAL) HOLTER  05/06/2024    > 1 Day. Avg 81. . 3 SVT    ECHO - CONVERTED  08/07/2012    EF 65%    ECHO - CONVERTED  10/12/2016    EF 65%, mild MR, borderline PHT    ECHO - CONVERTED  03/13/2018    @[facility]. EF 65%. RVSP_ 55 mmHg    ECHO - CONVERTED  09/24/2019    EF 65-70%. LA- 3.8 Cm. NIKITA- 1.7 Cm2, Mild MR. RVSP- 50 mmHg.    ECHO - CONVERTED   03/26/2021    ([facility]) EF normal, LA normal, RVSP 19 mmHg    ECHO - CONVERTED  06/21/2023    [facility]. EF 65%. LA- 4.1. Trace-Mild MR. RVSP- 37 mmHg    OTHER SURGICAL HISTORY  03/18/2020    CTA with runoff:  No sig peripheral stenosis bilaterally, fatty liver    OTHER SURGICAL HISTORY  06/24/2021    MCOT- 12 Days. AVG 67. . 27 beats run of SVT    US CAROTID UNILATERAL  12/03/2013    @[facility]- Critical Stenosis  (R) ICA.    US CAROTID UNILATERAL  09/24/2019    No sig stenosis of right CEA, no sig stenosis left carotid    US CAROTID UNILATERAL  03/27/2021    <50% stenosis bilaterally       Current Outpatient Medications   Medication Sig Dispense Refill    albuterol (PROVENTIL HFA;VENTOLIN HFA) 108 (90 BASE) MCG/ACT inhaler Inhale 2 puffs Every 4 (Four) Hours As Needed for Wheezing.      ascorbic acid (VITAMIN C) 1000 MG tablet Take 1 tablet by mouth Daily.      aspirin 81 MG EC tablet Take 1 tablet by mouth Daily.      atorvastatin (LIPITOR) 10 MG tablet Take 1 tablet by mouth Daily. 90 tablet 3    B Complex Vitamins (VITAMIN-B COMPLEX PO) Take  by mouth Daily.      CALCIUM PO Take  by mouth Daily.      clopidogrel (PLAVIX) 75 MG tablet Take 1 tablet by mouth Daily. 90 tablet 3    dilTIAZem (TIAZAC) 360 MG 24 hr capsule Take 1 capsule by mouth Daily. 90 capsule 3    DULoxetine (CYMBALTA) 60 MG capsule Take 1 capsule by mouth Daily.      Edarbi 80 MG tablet tablet Take 1 tablet by mouth Daily. 90 tablet 3    esomeprazole (nexIUM) 40 MG capsule Take 1 capsule by mouth 2 (Two) Times a Day.      famotidine (PEPCID) 40 MG tablet Take 1 tablet by mouth Every Night.      furosemide (LASIX) 40 MG tablet Take 0.5 tablets by mouth Daily. 90 tablet 3    gabapentin (NEURONTIN) 400 MG capsule One tablet in morning, 1 tablets at noon, 4 tablets at night and 1 tablet as needed at night      insulin regular (HumuLIN R) 500 UNIT/ML patient supplied pump Inject  under the skin into the appropriate area as directed Continuous.       levothyroxine (SYNTHROID, LEVOTHROID) 88 MCG tablet Take 1 tablet by mouth Daily.      metoprolol tartrate (LOPRESSOR) 50 MG tablet Take 1 tablet by mouth 2 (Two) Times a Day. 180 tablet 3    montelukast (SINGULAIR) 10 MG tablet Take 1 tablet by mouth Every Night.      Multiple Vitamin (MULTI VITAMIN DAILY PO) Take  by mouth Daily.      nitroglycerin (NITROSTAT) 0.4 MG SL tablet 1 under the tongue as needed for angina, may repeat q5mins for up three doses 90 tablet 1    O2 (OXYGEN) Inhale 2 L/min Every Night.      PRIMIDONE PO Take 5 mg by mouth Every Night.      secukinumab (Cosentyx) 125 MG/5ML solution injection Infuse  into a venous catheter.      tiotropium (SPIRIVA) 18 MCG per inhalation capsule Place 1 capsule into inhaler and inhale Daily.       No current facility-administered medications for this visit.       Adhesive tape, Augmentin [amoxicillin-pot clavulanate], Codeine, Demerol [meperidine], Hydrocodone, Lescol [fluvastatin sodium], Livalo [pitavastatin], Morphine and codeine, Oxycodone-acetaminophen, Pentazocine, Percocet [oxycodone-acetaminophen], Percodan [oxycodone-aspirin], Pravachol [pravastatin sodium], Ultram [tramadol hcl], Zetia [ezetimibe], Zocor [simvastatin], Other, and Tape    Past Medical History:   Diagnosis Date    Anemia     Cancer     Skin cancer removed.    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     HTN (hypertension)     Hypercholesterolemia     Palpitations     Previous back surgery     x5    Psoriatic arthritis     S/P GA (total abdominal hysterectomy)     Sleep apnea     wears CPAP    TIA (transient ischemic attack)        Social History  "    Socioeconomic History    Marital status:    Tobacco Use    Smoking status: Former     Passive exposure: Past    Smokeless tobacco: Never   Vaping Use    Vaping status: Never Used   Substance and Sexual Activity    Alcohol use: Not Currently     Comment: occasional wine    Drug use: No    Sexual activity: Defer       Family History   Problem Relation Age of Onset    Heart attack Mother     Pancreatic cancer Father     Other Brother         GABG    Hypertension Other        Review of Systems   Constitutional: Negative for malaise/fatigue.   Cardiovascular:  Positive for chest pain and dyspnea on exertion. Negative for claudication, cyanosis, irregular heartbeat, leg swelling, near-syncope, orthopnea, palpitations and syncope.   Respiratory:  Positive for shortness of breath. Negative for cough and wheezing.    Musculoskeletal:  Negative for back pain, joint pain and stiffness.   Gastrointestinal:  Negative for anorexia, heartburn, nausea and vomiting.   Genitourinary:  Negative for dysuria, hematuria, hesitancy and nocturia.   Neurological:  Negative for dizziness, headaches and light-headedness.   Psychiatric/Behavioral:  Negative for depression and memory loss. The patient is not nervous/anxious.            Objective     /65 (BP Location: Left arm, Patient Position: Sitting, Cuff Size: Adult)   Pulse 64   Resp 18   Ht 162 cm (63.78\")   Wt 87.5 kg (193 lb)   BMI 33.36 kg/m²     Constitutional:       Appearance: Not in distress.   Eyes:      Pupils: Pupils are equal, round, and reactive to light.   HENT:      Nose: Nose normal.   Pulmonary:      Effort: Pulmonary effort is normal.      Breath sounds: Normal breath sounds.   Cardiovascular:      PMI at left midclavicular line. Normal rate. Regular rhythm.      Murmurs: There is a systolic murmur.   Abdominal:      Palpations: Abdomen is soft.   Musculoskeletal: Normal range of motion.      Cervical back: Normal range of motion and neck supple. " Skin:     General: Skin is warm and dry.   Neurological:      Mental Status: Alert.         Procedures         Diagnoses and all orders for this visit:    1. Palpitations (Primary)  -     Holter Monitor - 72 Hour Up To 15 Days; Future    2. Essential hypertension    3. Hypercholesteremia    4. Bilateral carotid artery stenosis    5. Chest tightness    6. Type 2 diabetes mellitus with hyperglycemia, with long-term current use of insulin    7. Sleep apnea in adult    8. Grief    9. Obesity (BMI 30.0-34.9)    Other orders  -     atorvastatin (LIPITOR) 10 MG tablet; Take 1 tablet by mouth Daily.  Dispense: 90 tablet; Refill: 3  -     clopidogrel (PLAVIX) 75 MG tablet; Take 1 tablet by mouth Daily.  Dispense: 90 tablet; Refill: 3  -     dilTIAZem (TIAZAC) 360 MG 24 hr capsule; Take 1 capsule by mouth Daily.  Dispense: 90 capsule; Refill: 3  -     metoprolol tartrate (LOPRESSOR) 50 MG tablet; Take 1 tablet by mouth 2 (Two) Times a Day.  Dispense: 180 tablet; Refill: 3  -     nitroglycerin (NITROSTAT) 0.4 MG SL tablet; 1 under the tongue as needed for angina, may repeat q5mins for up three doses  Dispense: 90 tablet; Refill: 1             Palpitations: Noted, worse than before. Only wore holter for one day, will repeat for 14 days. Limited caffeine urged. More recommendations to follow.    HTN: BP is stable. Will continue with CCB, lopressor, and edarbi at same. Limited sodium urged    Carotid US: Followed by vascular, seeing Dr. Choi. He does serial US, will continue with good BP, lipid control, and plavix therapy.    Carotid status: Rare chest pain, worse this month, she states anniversary of her 's death. She thinks it is anxiety related. She defers further cardiac stress testing. Does have NTG to use as needed.    Hyperlipidemia: Taking statin therapy with lipitor. FLP with you. Tolerance noted, can we have for review? Limited carb diet urged.    COPD/VALERIE: Followed by pulmonary.    DM: Insulin dependent.  Followed by endocrine    CKD: Followed by renal for management.    Refills per request    BMI noted at 33.36, good cardiac ADA diet urged    6 month follow up urged, sooner if needed        Problems Addressed this Visit          Cardiac and Vasculature    Essential hypertension    Relevant Medications    dilTIAZem (TIAZAC) 360 MG 24 hr capsule    metoprolol tartrate (LOPRESSOR) 50 MG tablet    Hypercholesteremia    Relevant Medications    atorvastatin (LIPITOR) 10 MG tablet    Bilateral carotid artery disease       Endocrine and Metabolic    Type 2 diabetes mellitus with hyperglycemia, with long-term current use of insulin       Sleep    Sleep apnea in adult     Other Visit Diagnoses       Palpitations    -  Primary    Relevant Orders    Holter Monitor - 72 Hour Up To 15 Days    Chest tightness        Grief        Obesity (BMI 30.0-34.9)              Diagnoses         Codes Comments    Palpitations    -  Primary ICD-10-CM: R00.2  ICD-9-CM: 785.1     Essential hypertension     ICD-10-CM: I10  ICD-9-CM: 401.9     Hypercholesteremia     ICD-10-CM: E78.00  ICD-9-CM: 272.0     Bilateral carotid artery stenosis     ICD-10-CM: I65.23  ICD-9-CM: 433.10, 433.30     Chest tightness     ICD-10-CM: R07.89  ICD-9-CM: 786.59     Type 2 diabetes mellitus with hyperglycemia, with long-term current use of insulin     ICD-10-CM: E11.65, Z79.4  ICD-9-CM: 250.00, 790.29, V58.67     Sleep apnea in adult     ICD-10-CM: G47.30  ICD-9-CM: 780.57     Grief     ICD-10-CM: F43.21  ICD-9-CM: 309.0     Obesity (BMI 30.0-34.9)     ICD-10-CM: E66.811  ICD-9-CM: 278.00                       Electronically signed by Nano Hicks, APRN October 28, 2024 16:50 EDT

## 2024-11-26 RX ORDER — SOTALOL HYDROCHLORIDE 80 MG/1
40 TABLET ORAL 2 TIMES DAILY
Qty: 30 TABLET | Refills: 1 | Status: SHIPPED | OUTPATIENT
Start: 2024-11-26

## 2024-11-26 NOTE — TELEPHONE ENCOUNTER
It does not look like they are in. What dose are you wanting her to start with and I can put in to be pended to be signed?

## 2024-11-26 NOTE — TELEPHONE ENCOUNTER
40mg BID please of sotalol. Please come in Monday for EKG. Do not start till Friday. Continue metoprolol till then.

## 2024-11-26 NOTE — TELEPHONE ENCOUNTER
Script is pended to be sent to [facility] in [place]. Patient was made aware that she would be taking 1/2 tablet twice a day and to start Friday. Continue taking metoprolol through Thursday. Patient is coming in on Monday (12/02/24) for EKG.

## 2024-12-02 ENCOUNTER — TELEPHONE (OUTPATIENT)
Dept: CARDIOLOGY | Facility: CLINIC | Age: 79
End: 2024-12-02

## 2024-12-02 ENCOUNTER — CLINICAL SUPPORT (OUTPATIENT)
Dept: CARDIOLOGY | Facility: CLINIC | Age: 79
End: 2024-12-02
Payer: MEDICARE

## 2024-12-02 DIAGNOSIS — I47.10 PSVT (PAROXYSMAL SUPRAVENTRICULAR TACHYCARDIA): Primary | ICD-10-CM

## 2024-12-02 PROCEDURE — 93000 ELECTROCARDIOGRAM COMPLETE: CPT | Performed by: NURSE PRACTITIONER

## 2024-12-02 NOTE — TELEPHONE ENCOUNTER
Her sotalol should only cause a negligible effect on breathing. Given she tolerated metoprolol, I think this is a good choice for her. EKG today looks great. Have palpitations improved? GFR available show renal function is within parameters to take this medication.

## 2024-12-02 NOTE — PROGRESS NOTES
Procedure     ECG 12 Lead    Date/Time: 12/2/2024 4:00 PM  Performed by: Nano Hicks APRN    Authorized by: Nano Hicks APRN  Comparison: compared with previous ECG from 4/19/2024  Similar to previous ECG  Rhythm: sinus rhythm  BPM: 73    Clinical impression: abnormal EKG  Comments: Normal QT

## 2024-12-03 NOTE — TELEPHONE ENCOUNTER
Patient states that she does not think that breathing is effected that much, maybe just about 10 minutes. She states that she wears oxygen at night. She states that she is ok with continuing on sotalol 40 mg BID. She was made aware that if she feels like palpitations are worsening to let us know or if she thinks that breathing is more effected to let us know and we could possibly change to flecainide.

## 2024-12-03 NOTE — TELEPHONE ENCOUNTER
Yes, I made sure she knew to call us if she has any problems between now and her next appointment.

## 2025-01-23 RX ORDER — SOTALOL HYDROCHLORIDE 80 MG/1
TABLET ORAL
Qty: 30 TABLET | Refills: 4 | Status: SHIPPED | OUTPATIENT
Start: 2025-01-23

## 2025-02-11 ENCOUNTER — TELEPHONE (OUTPATIENT)
Dept: CARDIOLOGY | Facility: CLINIC | Age: 80
End: 2025-02-11
Payer: MEDICARE

## 2025-02-11 NOTE — TELEPHONE ENCOUNTER
Caller: Bibiana Quezada    Relationship to patient: Self    Best call back number: [phone]    Chief complaint: PATIENT IS MOVING OUT OF STATE AND WOULD LIKE TO SEE [facility] BEFORE SHE MOVES    Type of visit: FOLLOW-UP    Requested date: MARCH OR MID APRIL     If rescheduling, when is the original appointment: 5.12

## 2025-04-02 ENCOUNTER — OFFICE VISIT (OUTPATIENT)
Dept: CARDIOLOGY | Facility: CLINIC | Age: 80
End: 2025-04-02
Payer: MEDICARE

## 2025-04-02 VITALS
WEIGHT: 187 LBS | HEART RATE: 77 BPM | HEIGHT: 64 IN | BODY MASS INDEX: 31.92 KG/M2 | SYSTOLIC BLOOD PRESSURE: 124 MMHG | DIASTOLIC BLOOD PRESSURE: 86 MMHG

## 2025-04-02 DIAGNOSIS — I73.9 PVD (PERIPHERAL VASCULAR DISEASE) WITH CLAUDICATION: ICD-10-CM

## 2025-04-02 DIAGNOSIS — Z98.890 H/O CAROTID ENDARTERECTOMY: ICD-10-CM

## 2025-04-02 DIAGNOSIS — Z79.899 LONG TERM CURRENT USE OF ANTIARRHYTHMIC DRUG: ICD-10-CM

## 2025-04-02 DIAGNOSIS — R00.2 PALPITATIONS: ICD-10-CM

## 2025-04-02 DIAGNOSIS — E78.00 HYPERCHOLESTEREMIA: ICD-10-CM

## 2025-04-02 DIAGNOSIS — I47.10 PSVT (PAROXYSMAL SUPRAVENTRICULAR TACHYCARDIA): ICD-10-CM

## 2025-04-02 DIAGNOSIS — I10 ESSENTIAL HYPERTENSION: Primary | ICD-10-CM

## 2025-04-02 DIAGNOSIS — I65.23 BILATERAL CAROTID ARTERY STENOSIS: ICD-10-CM

## 2025-04-02 RX ORDER — SOTALOL HYDROCHLORIDE 80 MG/1
40 TABLET ORAL 2 TIMES DAILY
Qty: 60 TABLET | Refills: 4 | Status: SHIPPED | OUTPATIENT
Start: 2025-04-02

## 2025-04-02 RX ORDER — FUROSEMIDE 40 MG/1
20 TABLET ORAL AS NEEDED
Qty: 30 TABLET | Refills: 11 | Status: SHIPPED | OUTPATIENT
Start: 2025-04-02

## 2025-04-02 RX ORDER — CYANOCOBALAMIN 1000 UG/ML
1000 INJECTION, SOLUTION INTRAMUSCULAR; SUBCUTANEOUS
COMMUNITY

## 2025-04-02 RX ORDER — ATORVASTATIN CALCIUM 10 MG/1
10 TABLET, FILM COATED ORAL DAILY
Qty: 90 TABLET | Refills: 3 | Status: SHIPPED | OUTPATIENT
Start: 2025-04-02

## 2025-04-02 RX ORDER — TRAMADOL HYDROCHLORIDE 100 MG/1
100 TABLET, COATED ORAL EVERY 6 HOURS PRN
COMMUNITY

## 2025-04-02 RX ORDER — CLOPIDOGREL BISULFATE 75 MG/1
75 TABLET ORAL DAILY
Qty: 90 TABLET | Refills: 3 | Status: SHIPPED | OUTPATIENT
Start: 2025-04-02

## 2025-04-02 RX ORDER — DILTIAZEM HYDROCHLORIDE 360 MG/1
360 CAPSULE, EXTENDED RELEASE ORAL DAILY
Qty: 90 CAPSULE | Refills: 3 | Status: SHIPPED | OUTPATIENT
Start: 2025-04-02

## 2025-04-02 NOTE — PROGRESS NOTES
Chief Complaint   Patient presents with    Follow-up     Cardiac management . Patient is anticipating having surgery to her  back  . She is also anticipating [place] with [place] daughter once her surgery is done.    Palpitations     Patient reports she has not noticed any palpitations or tachycardia    LABS     Patient reports she had labs recently with PCP. She reports  labs were better this time. Reports her GFR had improved. Will call for result     Med Refill     Requests refills  for atorvastatin,sotalol,Plavix, diltiazem 90 day supply to [facility]  She would like Edarbi sent to specialty pharmacy, she will call with name of pharmacy        HPI:  HPI   Bibiana Quezada is a 79 y.o. female with HTN, hypothyroidism, hyperlipidemia, carotid artery disease s/p (R)CEA in January 2014, VALERIE, COPD, DM, and asthma/bronchitis after inhaling noxious chemicals many years ago followed by Dr. Fisher.  Cardiac cath in 2016, 2018, and 2021 showed normal coronaries. On 3/30/2021, she developed chilling, diaphoresis, intermittent substernal chest discomfort. Cardiac work-up including stress test and echocardiogram showed no ischemia, normal LV function, normal LA. Cardiac cath was then performed revealing normal coronary arteries, normal LVEF, and mild to moderate MR. Holter x 12  in May 2021 showed avg HR of 67, NSR, with one SVT 27 beats, no symptoms noted at that time, no pauses. Stress test and echocardiogram 6/21/23 after reporting more dyspnea and chest tightness showed anterior wall changes suggestive of breast attenuation but could not rule out ischemia. EF 70%, PA pressure 37 mmhg, mild to moderate LVH. Minoxidil recommended. Imdur added. She did not add either. CTA of the abdominal aorta with runoff showed atherosclerosis but no significant stenosis and it was determined to continue to monitor.  Carotid US 2/2024 showed LICA >70%, SUSHMA 50-69%. She also had a CT carotids not of good quality R/T shoulder  listhesis and cervical disc fusion, so a referral to vascular surgeon, Dr. Choi was put in and she did have a stent put in subclavian artery with Dr. Choi.      She returns today for a follow up visit. Patient denies chest pain, pressure, palpitations, tightness, dizziness, shortness of air. She is doing well with sotalol. She is planning on having back surgery and then moving to [place] with daughter. Labs with PCP recently and reported as good with improved kidney function.  She has recently, within the last year, lost her  of 61 years.    Cardiac History:    Past Surgical History:   Procedure Laterality Date    CARDIAC CATHETERIZATION  10/24/2016    Normal Coronaries. Hypertensive Heart Disease.    CARDIAC CATHETERIZATION  08/21/2018    Normal Coronaries. EF 65%. PA- 60/28(44). LVEDP- 14. PCW 29.    CARDIAC CATHETERIZATION  03/29/2021    Normal coronaries, normal EF    CARDIOVASCULAR STRESS TEST  10/12/2016    L. Myoview- Apical Ischemia    CARDIOVASCULAR STRESS TEST  05/14/2013    L. Myoview- R/O Apical Ischemia.    CARDIOVASCULAR STRESS TEST  12/14/2011     L. Myoview- (Fl) Negative     CARDIOVASCULAR STRESS TEST  10/12/2016    Lexiscan- small apical ischemia, increase Imdur, cath    CARDIOVASCULAR STRESS TEST  03/27/2021    ([facility]) Lexiscan-no ischemia, EF 76%    CARDIOVASCULAR STRESS TEST  06/21/2023    Lexiscan- EF 59%. 197/55. Breast Attenuation Vs Anterior Ischemia    CAROTID ENDARTERECTOMY  01/16/2014    (R) Endarterectomy by Dr. Clayton    CONVERTED (HISTORICAL) HOLTER  05/06/2024    > 1 Day. Avg 81. . 3 SVT    CONVERTED (HISTORICAL) HOLTER  11/25/2024    13 Days. AVG 85, .2 VT. 26 SVT    ECHO - CONVERTED  08/07/2012    EF 65%    ECHO - CONVERTED  10/12/2016    EF 65%, mild MR, borderline PHT    ECHO - CONVERTED  03/13/2018    @[facility]. EF 65%. RVSP_ 55 mmHg    ECHO - CONVERTED  09/24/2019    EF 65-70%. LA- 3.8 Cm. NIKITA- 1.7 Cm2, Mild MR. RVSP- 50 mmHg.    ECHO - CONVERTED  03/26/2021    ([facility])  EF normal, LA normal, RVSP 19 mmHg    ECHO - CONVERTED  06/21/2023    [facility]. EF 65%. LA- 4.1. Trace-Mild MR. RVSP- 37 mmHg    OTHER SURGICAL HISTORY  03/18/2020    CTA with runoff:  No sig peripheral stenosis bilaterally, fatty liver    OTHER SURGICAL HISTORY  06/24/2021    MCOT- 12 Days. AVG 67. . 27 beats run of SVT    US CAROTID UNILATERAL  12/03/2013    @[facility]- Critical Stenosis  (R) ICA.    US CAROTID UNILATERAL  09/24/2019    No sig stenosis of right CEA, no sig stenosis left carotid    US CAROTID UNILATERAL  03/27/2021    <50% stenosis bilaterally       Current Outpatient Medications   Medication Sig Dispense Refill    albuterol (PROVENTIL HFA;VENTOLIN HFA) 108 (90 BASE) MCG/ACT inhaler Inhale 2 puffs Every 4 (Four) Hours As Needed for Wheezing.      ascorbic acid (VITAMIN C) 1000 MG tablet Take 1 tablet by mouth Daily.      atorvastatin (LIPITOR) 10 MG tablet Take 1 tablet by mouth Daily. 90 tablet 3    CALCIUM PO Take  by mouth Daily.      clopidogrel (PLAVIX) 75 MG tablet Take 1 tablet by mouth Daily. 90 tablet 3    cyanocobalamin 1000 MCG/ML injection Inject 1 mL into the appropriate muscle as directed by prescriber Every 30 (Thirty) Days.      dilTIAZem (TIAZAC) 360 MG 24 hr capsule Take 1 capsule by mouth Daily. 90 capsule 3    DULoxetine (CYMBALTA) 60 MG capsule Take 1 capsule by mouth Daily.      Edarbi 80 MG tablet tablet Take 1 tablet by mouth Daily. 90 tablet 3    esomeprazole (nexIUM) 40 MG capsule Take 1 capsule by mouth Every Morning.      famotidine (PEPCID) 40 MG tablet Take 1 tablet by mouth Every Night.      furosemide (LASIX) 40 MG tablet Take 0.5 tablets by mouth As Needed (swelling). 30 tablet 11    gabapentin (NEURONTIN) 400 MG capsule One tablet in morning, 1 tablets at noon, 4 tablets at night and 1 tablet as needed at night      insulin regular (HumuLIN R) 500 UNIT/ML patient supplied pump Inject  under the skin into the appropriate area as directed Continuous.      levothyroxine  (SYNTHROID, LEVOTHROID) 88 MCG tablet Take 1 tablet by mouth Daily.      montelukast (SINGULAIR) 10 MG tablet Take 1 tablet by mouth Every Night.      Multiple Vitamin (MULTI VITAMIN DAILY PO) Take  by mouth Daily.      nitroglycerin (NITROSTAT) 0.4 MG SL tablet 1 under the tongue as needed for angina, may repeat q5mins for up three doses 90 tablet 1    O2 (OXYGEN) Inhale 2 L/min Every Night.      PRIMIDONE PO Take 5 mg by mouth Every Night.      secukinumab (Cosentyx) 125 MG/5ML solution injection Infuse  into a venous catheter Every 30 (Thirty) Days.      sotalol (BETAPACE) 80 MG tablet Take 0.5 tablets by mouth 2 (Two) Times a Day. 60 tablet 4    tiotropium (SPIRIVA) 18 MCG per inhalation capsule Place 1 capsule into inhaler and inhale Daily.      traMADol HCl (ULTRAM) 100 MG tablet Take 1 tablet by mouth Every 6 (Six) Hours As Needed.       No current facility-administered medications for this visit.       Adhesive tape, Augmentin [amoxicillin-pot clavulanate], Codeine, Demerol [meperidine], Hydrocodone, Lescol [fluvastatin sodium], Livalo [pitavastatin], Morphine and codeine, Oxycodone-acetaminophen, Pentazocine, Percocet [oxycodone-acetaminophen], Percodan [oxycodone-aspirin], Pravachol [pravastatin sodium], Ultram [tramadol hcl], Zetia [ezetimibe], Zocor [simvastatin], Other, and Tape    Past Medical History:   Diagnosis Date    Anemia     Cancer     Skin cancer removed.    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     HTN (hypertension)     Hypercholesterolemia     Palpitations     Previous back surgery     x5    Psoriatic arthritis     S/P GA (total abdominal hysterectomy)      "Sleep apnea     wears CPAP    TIA (transient ischemic attack)        Social History     Socioeconomic History    Marital status: Other   Tobacco Use    Smoking status: Former     Passive exposure: Past    Smokeless tobacco: Never   Vaping Use    Vaping status: Never Used   Substance and Sexual Activity    Alcohol use: Not Currently     Comment: occasional wine    Drug use: No    Sexual activity: Defer       Family History   Problem Relation Age of Onset    Heart attack Mother     Pancreatic cancer Father     Other Brother         GABG    Hypertension Other        Vitals:   /86 (BP Location: Left arm, Patient Position: Sitting, Cuff Size: Adult)   Pulse 77   Ht 162 cm (63.78\")   Wt 84.8 kg (187 lb)   BMI 32.32 kg/m²     Physical Exam  Vitals and nursing note reviewed.   Constitutional:       Appearance: She is obese.   Neck:      Vascular: No carotid bruit.   Cardiovascular:      Rate and Rhythm: Normal rate and regular rhythm.      Pulses: Normal pulses.      Heart sounds: Normal heart sounds. No murmur heard.     No friction rub. No gallop.   Pulmonary:      Effort: Pulmonary effort is normal.      Breath sounds: Normal breath sounds and air entry.   Musculoskeletal:      Right lower leg: No edema.      Left lower leg: No edema.   Skin:     General: Skin is warm and dry.      Capillary Refill: Capillary refill takes less than 2 seconds.   Neurological:      Mental Status: She is alert and oriented to person, place, and time.         ECG 12 Lead    Date/Time: 4/2/2025 2:47 PM  Performed by: Alyssa Orozco APRN    Authorized by: Alyssa Orozco APRN  Comparison: compared with previous ECG from 12/2/2024  Similar to previous ECG  Rhythm: sinus rhythm  Rate: normal  BPM: 77  Other findings: non-specific ST-T wave changes  Comments: QTc 434 ms           Assessment & Plan     Diagnoses and all orders for this visit:    1. Essential hypertension (Primary)    2. PSVT (paroxysmal supraventricular tachycardia)  -     " ECG 12 Lead  -     sotalol (BETAPACE) 80 MG tablet; Take 0.5 tablets by mouth 2 (Two) Times a Day.  Dispense: 60 tablet; Refill: 4    3. Palpitations  -     ECG 12 Lead  -     sotalol (BETAPACE) 80 MG tablet; Take 0.5 tablets by mouth 2 (Two) Times a Day.  Dispense: 60 tablet; Refill: 4    4. Bilateral carotid artery stenosis    5. Hypercholesteremia  -     atorvastatin (LIPITOR) 10 MG tablet; Take 1 tablet by mouth Daily.  Dispense: 90 tablet; Refill: 3    6. H/O carotid endarterectomy    7. PVD (peripheral vascular disease) with claudication  -     clopidogrel (PLAVIX) 75 MG tablet; Take 1 tablet by mouth Daily.  Dispense: 90 tablet; Refill: 3    8. Long term current use of antiarrhythmic drug  -     ECG 12 Lead    Other orders  -     dilTIAZem (TIAZAC) 360 MG 24 hr capsule; Take 1 capsule by mouth Daily.  Dispense: 90 capsule; Refill: 3  -     furosemide (LASIX) 40 MG tablet; Take 0.5 tablets by mouth As Needed (swelling).  Dispense: 30 tablet; Refill: 11    HTN  - BP controlled  - Continue current antihypertensive medication regimen    Hypercholesteremia  - Labs managed with PCP  - Continue current statin therapy    Palpitations/PSVT/antiarrhythmic  - Controlled with sotalol  - EKG normal sinus rhythm ST and T wave abnormality rate 77 bpm normal QTc and [place] intervals    Carotid stenosis/carotid endarterectomy  -  R CEA with Dr. Nicholas 1/2024  - Continue Plavix and statin    Stable from a cardiac standpoint. No further testing recommended at this time. No medication changes made today. Refills sent to pharmacy.  She will call with specialty pharmacy to send Edarbi to    Visit Diagnoses:    ICD-10-CM ICD-9-CM   1. Essential hypertension  I10 401.9   2. PSVT (paroxysmal supraventricular tachycardia)  I47.10 427.0   3. Palpitations  R00.2 785.1   4. Bilateral carotid artery stenosis  I65.23 433.10     433.30   5. Hypercholesteremia  E78.00 272.0   6. H/O carotid endarterectomy  Z98.890 V45.89   7. PVD (peripheral  vascular disease) with claudication  I73.9 443.9   8. Long term current use of antiarrhythmic drug  Z79.899 V58.69       Meds Ordered During Visit:  New Medications Ordered This Visit   Medications    atorvastatin (LIPITOR) 10 MG tablet     Sig: Take 1 tablet by mouth Daily.     Dispense:  90 tablet     Refill:  3    clopidogrel (PLAVIX) 75 MG tablet     Sig: Take 1 tablet by mouth Daily.     Dispense:  90 tablet     Refill:  3    dilTIAZem (TIAZAC) 360 MG 24 hr capsule     Sig: Take 1 capsule by mouth Daily.     Dispense:  90 capsule     Refill:  3    furosemide (LASIX) 40 MG tablet     Sig: Take 0.5 tablets by mouth As Needed (swelling).     Dispense:  30 tablet     Refill:  11    sotalol (BETAPACE) 80 MG tablet     Sig: Take 0.5 tablets by mouth 2 (Two) Times a Day.     Dispense:  60 tablet     Refill:  4       Follow Up Appointment:   Return [place] is [place]. Call as needed.           This document has been electronically signed by RENETTA Hawkins  April 4, 2025 09:24 EDT    Dictated Utilizing Dragon Dictation: Part of this note may be an electronic transcription/translation of spoken language to printed text using the Dragon Dictation System.

## 2025-04-15 RX ORDER — NITROGLYCERIN 0.4 MG/1
TABLET SUBLINGUAL
Qty: 75 TABLET | Refills: 3 | Status: SHIPPED | OUTPATIENT
Start: 2025-04-15

## 2025-04-21 ENCOUNTER — TELEPHONE (OUTPATIENT)
Dept: CARDIOLOGY | Facility: CLINIC | Age: 80
End: 2025-04-21
Payer: MEDICARE

## 2025-04-21 NOTE — TELEPHONE ENCOUNTER
Received fax from Dr. Alvarez Ram for cardiac clearance for patient to have a right L1-2 HLPF with discectomy. Patient is on Plavix and they are requesting to hold for 7 days prior to surgery and resume 2 days after surgery. According to our records, I do not see where patient has had any stenting. Patient has a history of TIA.          Fax [phone]

## 2025-05-09 RX ORDER — DILTIAZEM HYDROCHLORIDE 360 MG/1
360 CAPSULE, EXTENDED RELEASE ORAL DAILY
Qty: 90 CAPSULE | Refills: 3 | Status: SHIPPED | OUTPATIENT
Start: 2025-05-09

## 2025-05-23 DIAGNOSIS — R00.2 PALPITATIONS: ICD-10-CM

## 2025-05-23 DIAGNOSIS — I47.10 PSVT (PAROXYSMAL SUPRAVENTRICULAR TACHYCARDIA): ICD-10-CM

## 2025-05-23 RX ORDER — SOTALOL HYDROCHLORIDE 80 MG/1
40 TABLET ORAL 2 TIMES DAILY
Qty: 60 TABLET | Refills: 5 | OUTPATIENT
Start: 2025-05-23